# Patient Record
Sex: MALE | Race: WHITE | NOT HISPANIC OR LATINO | Employment: OTHER | URBAN - NONMETROPOLITAN AREA
[De-identification: names, ages, dates, MRNs, and addresses within clinical notes are randomized per-mention and may not be internally consistent; named-entity substitution may affect disease eponyms.]

---

## 2017-01-12 ENCOUNTER — ANTICOAGULATION THERAPY VISIT (OUTPATIENT)
Dept: ANTICOAGULATION | Facility: OTHER | Age: 82
End: 2017-01-12
Payer: COMMERCIAL

## 2017-01-12 DIAGNOSIS — Z79.01 LONG-TERM (CURRENT) USE OF ANTICOAGULANTS: Primary | ICD-10-CM

## 2017-01-12 LAB — INR POINT OF CARE: 2.9 (ref 0.86–1.14)

## 2017-01-12 PROCEDURE — 36416 COLLJ CAPILLARY BLOOD SPEC: CPT

## 2017-01-12 PROCEDURE — 99207 ZZC NO CHARGE NURSE ONLY: CPT

## 2017-01-12 PROCEDURE — 85610 PROTHROMBIN TIME: CPT | Mod: QW

## 2017-01-12 NOTE — MR AVS SNAPSHOT
Kevin Partida   1/12/2017 9:30 AM   Anticoagulation Therapy Visit    Description:  81 year old male   Provider:  ARNIE ANTI COAG   Department:  [facility]           INR as of 1/12/2017     Selected INR 2.9 (1/12/2017)      Anticoagulation Summary as of 1/12/2017     INR goal 2.0-3.0   Selected INR 2.9 (1/12/2017)   Full instructions 7.5 mg on Tue, Thu, Sat; 5 mg all other days   Next INR check 2/23/2017    Indications   Long-term (current) use of anticoagulants [Z79.01] [Z79.01]         Your next Anticoagulation Clinic appointment(s)     Feb 23, 2017  9:30 AM   Anticoagulation Visit with ARNIE ANTI VINCE   [facility]    [address] [facility] [id number] [phone]              Contact Numbers     Clinic Number:         January 2017 Details    Sun Mon Tue Wed Thu Fri Sat     1               2               3               4               5               6               7                 8               9               10               11               12      7.5 mg   See details      13      5 mg         14      7.5 mg           15      5 mg         16      5 mg         17      7.5 mg         18      5 mg         19      7.5 mg         20      5 mg         21      7.5 mg           22      5 mg         23      5 mg         24      7.5 mg         25      5 mg         26      7.5 mg         27      5 mg         28      7.5 mg           29      5 mg         30      5 mg         31      7.5 mg              Date Details   01/12 This INR check               How to take your warfarin dose     To take:  5 mg Take 1 of the 5 mg tablets.    To take:  7.5 mg Take 1.5 of the 5 mg tablets.           February 2017 Details    Sun Mon Tue Wed Thu Fri Sat        1      5 mg         2      7.5 mg         3      5 mg         4      7.5 mg           5      5 mg         6      5 mg         7      7.5 mg         8      5 mg         9      7.5 mg         10      5 mg         11      7.5 mg            12      5 mg         13      5 mg         14      7.5 mg         15      5 mg         16      7.5 mg         17      5 mg         18      7.5 mg           19      5 mg         20      5 mg         21      7.5 mg         22      5 mg         23            24               25                 26               27               28                    Date Details   No additional details    Date of next INR:  2/23/2017         How to take your warfarin dose     To take:  5 mg Take 1 of the 5 mg tablets.    To take:  7.5 mg Take 1.5 of the 5 mg tablets.

## 2017-01-12 NOTE — PROGRESS NOTES
ANTICOAGULATION FOLLOW-UP CLINIC VISIT    Patient Name:  Kevin Partida  Date:  1/12/2017  Contact Type:  Face to Face    SUBJECTIVE:     Patient Findings     Positives No Problem Findings           OBJECTIVE    INR PROTIME   Date Value Ref Range Status   01/12/2017 2.9* 0.86 - 1.14 Final       ASSESSMENT / PLAN  INR assessment THER    Recheck INR In: 6 WEEKS    INR Location Clinic      Anticoagulation Summary as of 1/12/2017     INR goal 2.0-3.0   Selected INR 2.9 (1/12/2017)   Maintenance plan 7.5 mg (5 mg x 1.5) on Tue, Thu, Sat; 5 mg (5 mg x 1) all other days   Full instructions 7.5 mg on Tue, Thu, Sat; 5 mg all other days   Weekly total 42.5 mg   No change documented Diego Almanzar RN   Plan last modified Diego Almanzar RN (3/14/2016)   Next INR check 2/23/2017   Target end date     Indications   Long-term (current) use of anticoagulants [Z79.01] [Z79.01]         Anticoagulation Episode Summary     INR check location     Preferred lab     Send INR reminders to [facility]    Comments       Anticoagulation Care Providers     Provider Role Specialty Phone number    Taqueria Borrego MD [facility] [phone]            See the Encounter Report to view Anticoagulation Flowsheet and Dosing Calendar (Go to Encounters tab in chart review, and find the Anticoagulation Therapy Visit)    Dosage adjustment made based on physician directed care plan.    Diego Almanzar RN

## 2017-02-23 ENCOUNTER — ANTICOAGULATION THERAPY VISIT (OUTPATIENT)
Dept: ANTICOAGULATION | Facility: OTHER | Age: 82
End: 2017-02-23
Payer: COMMERCIAL

## 2017-02-23 DIAGNOSIS — Z79.01 LONG-TERM (CURRENT) USE OF ANTICOAGULANTS: ICD-10-CM

## 2017-02-23 LAB — INR POINT OF CARE: 4 (ref 0.86–1.14)

## 2017-02-23 PROCEDURE — 85610 PROTHROMBIN TIME: CPT | Mod: QW

## 2017-02-23 PROCEDURE — 99207 ZZC NO CHARGE NURSE ONLY: CPT

## 2017-02-23 PROCEDURE — 36416 COLLJ CAPILLARY BLOOD SPEC: CPT

## 2017-02-23 NOTE — PROGRESS NOTES
ANTICOAGULATION FOLLOW-UP CLINIC VISIT    Patient Name:  Kevin Partida  Date:  2/23/2017  Contact Type:  Face to Face    SUBJECTIVE:     Patient Findings     Positives Other complaints (Just got over the stomach flu/diarrhea)           OBJECTIVE    INR Protime   Date Value Ref Range Status   02/23/2017 4.0 (A) 0.86 - 1.14 Final       ASSESSMENT / PLAN  INR assessment SUPRA    Recheck INR In: 6 WEEKS    INR Location Clinic      Anticoagulation Summary as of 2/23/2017     INR goal 2.0-3.0   Today's INR 4.0!   Maintenance plan 7.5 mg (5 mg x 1.5) on Tue, Thu, Sat; 5 mg (5 mg x 1) all other days   Full instructions 2/23: Hold; Otherwise 7.5 mg on Tue, Thu, Sat; 5 mg all other days   Weekly total 42.5 mg   Plan last modified Diego Almanzar RN (3/14/2016)   Next INR check 4/6/2017   Target end date     Indications   Long-term (current) use of anticoagulants [Z79.01] [Z79.01]         Anticoagulation Episode Summary     INR check location     Preferred lab     Send INR reminders to [facility]    Comments       Anticoagulation Care Providers     Provider Role Specialty Phone number    Taqueria Borrego MD [facility] [phone]            See the Encounter Report to view Anticoagulation Flowsheet and Dosing Calendar (Go to Encounters tab in chart review, and find the Anticoagulation Therapy Visit)    Dosage adjustment made based on physician directed care plan.    Diego Almanzar, RN

## 2017-02-23 NOTE — MR AVS SNAPSHOT
Kevin Partida   2/23/2017 9:30 AM   Anticoagulation Therapy Visit    Description:  81 year old male   Provider:  ARNIE ANTI COAG   Department:  [facility]           INR as of 2/23/2017     Today's INR 4.0!      Anticoagulation Summary as of 2/23/2017     INR goal 2.0-3.0   Today's INR 4.0!   Full instructions 2/23: Hold; Otherwise 7.5 mg on Tue, Thu, Sat; 5 mg all other days   Next INR check 4/6/2017    Indications   Long-term (current) use of anticoagulants [Z79.01] [Z79.01]         Your next Anticoagulation Clinic appointment(s)     Feb 23, 2017  9:30 AM CST   [facility]  ANTI [facility]AG   [facility]    [address] [facility] [address]   [phone]            Apr 06, 2017  9:30 AM CDT   Anticoagulation Visit with  ANTI COAG   [facility] ([facility]    [address] [facility] [address]   [phone]              Contact Numbers     Clinic Number:         February 2017 Details    Sun Mon Tue Wed Thu Fri Sat        1               2               3               4                 5               6               7               8               9               10               11                 12               13               14               15               16               17               18                 19               20               21               22               23      Hold   See details      24      5 mg         25      7.5 mg           26      5 mg         27      5 mg         28      7.5 mg              Date Details   02/23 This INR check               How to take your warfarin dose     To take:  5 mg Take 1 of the 5 mg tablets.    To take:  7.5 mg Take 1.5 of the 5 mg tablets.    Hold Do not take your warfarin dose. See the Details table to the right for additional instructions.                March 2017 Details    Sun Mon Tue Wed Thu Fri Sat        1      5 mg         2      7.5 mg         3      5  mg         4      7.5 mg           5      5 mg         6      5 mg         7      7.5 mg         8      5 mg         9      7.5 mg         10      5 mg         11      7.5 mg           12      5 mg         13      5 mg         14      7.5 mg         15      5 mg         16      7.5 mg         17      5 mg         18      7.5 mg           19      5 mg         20      5 mg         21      7.5 mg         22      5 mg         23      7.5 mg         24      5 mg         25      7.5 mg           26      5 mg         27      5 mg         28      7.5 mg         29      5 mg         30      7.5 mg         31      5 mg           Date Details   No additional details            How to take your warfarin dose     To take:  5 mg Take 1 of the 5 mg tablets.    To take:  7.5 mg Take 1.5 of the 5 mg tablets.           April 2017 Details    Sun Mon Tue Wed Thu Fri Sat           1      7.5 mg           2      5 mg         3      5 mg         4      7.5 mg         5      5 mg         6            7               8                 9               10               11               12               13               14               15                 16               17               18               19               20               21               22                 23               24               25               26               27               28               29                 30                      Date Details   No additional details    Date of next INR:  4/6/2017         How to take your warfarin dose     To take:  5 mg Take 1 of the 5 mg tablets.    To take:  7.5 mg Take 1.5 of the 5 mg tablets.

## 2017-03-29 DIAGNOSIS — I48.20 CHRONIC ATRIAL FIBRILLATION (H): ICD-10-CM

## 2017-03-29 RX ORDER — WARFARIN SODIUM 5 MG/1
TABLET ORAL
Qty: 105 TABLET | Refills: 0 | Status: SHIPPED | OUTPATIENT
Start: 2017-03-29 | End: 2017-06-23

## 2017-03-29 NOTE — TELEPHONE ENCOUNTER
Jantoven 5mg    Last Written Prescription Date: 12/26/16  Last Fill Qty: 105, # refills: 0  Last Office Visit with G, P or [facility] prescribing provider: 9/28/16  Next 5 appointments (look out 90 days)     Apr 11, 2017 11:30 AM CDT   Return Visit with Jaquan Platt MD   [facility]    [address] [place] [address] [phone]                   Date and Result of Last PT/INR:   Lab Results   Component Value Date    INR 4.0 02/23/2017    INR 2.9 01/12/2017    INR 2.66 09/28/2016    INR 1.03 05/31/2011      Johana Peña-Technician  [facility]  [phone]

## 2017-04-04 ENCOUNTER — HOSPITAL ENCOUNTER (OUTPATIENT)
Dept: CARDIOLOGY | Facility: CLINIC | Age: 82
Discharge: HOME | End: 2017-04-04
Attending: INTERNAL MEDICINE | Admitting: INTERNAL MEDICINE
Payer: MEDICARE

## 2017-04-04 DIAGNOSIS — I25.5 ISCHEMIC CARDIOMYOPATHY: ICD-10-CM

## 2017-04-04 DIAGNOSIS — E78.00 HYPERCHOLESTEROLEMIA: ICD-10-CM

## 2017-04-04 LAB
ALT SERPL W P-5'-P-CCNC: 23 U/L (ref 0–70)
ANION GAP SERPL CALCULATED.3IONS-SCNC: 8 MMOL/L (ref 3–14)
BUN SERPL-MCNC: 19 MG/DL (ref 7–30)
CALCIUM SERPL-MCNC: 8.8 MG/DL (ref 8.5–10.1)
CHLORIDE SERPL-SCNC: 107 MMOL/L (ref 94–109)
CHOLEST SERPL-MCNC: 132 MG/DL
CO2 SERPL-SCNC: 30 MMOL/L (ref 20–32)
CREAT SERPL-MCNC: 0.9 MG/DL (ref 0.66–1.25)
GFR SERPL CREATININE-BSD FRML MDRD: 81 ML/MIN/1.7M2
GLUCOSE SERPL-MCNC: 122 MG/DL (ref 70–99)
HDLC SERPL-MCNC: 40 MG/DL
LDLC SERPL CALC-MCNC: 71 MG/DL
NONHDLC SERPL-MCNC: 92 MG/DL
POTASSIUM SERPL-SCNC: 4.2 MMOL/L (ref 3.4–5.3)
SODIUM SERPL-SCNC: 145 MMOL/L (ref 133–144)
TRIGL SERPL-MCNC: 107 MG/DL

## 2017-04-04 PROCEDURE — 40000264 ECHO COMPLETE WITH LUMASON

## 2017-04-04 PROCEDURE — 25500064 ZZH RX 255 OP 636: Performed by: INTERNAL MEDICINE

## 2017-04-04 PROCEDURE — 84460 ALANINE AMINO (ALT) (SGPT): CPT | Performed by: INTERNAL MEDICINE

## 2017-04-04 PROCEDURE — 36415 COLL VENOUS BLD VENIPUNCTURE: CPT | Performed by: INTERNAL MEDICINE

## 2017-04-04 PROCEDURE — 80061 LIPID PANEL: CPT | Performed by: INTERNAL MEDICINE

## 2017-04-04 PROCEDURE — 80048 BASIC METABOLIC PNL TOTAL CA: CPT | Performed by: INTERNAL MEDICINE

## 2017-04-04 PROCEDURE — 93306 TTE W/DOPPLER COMPLETE: CPT | Mod: 26 | Performed by: INTERNAL MEDICINE

## 2017-04-04 RX ADMIN — SULFUR HEXAFLUORIDE 5 ML: KIT at 10:00

## 2017-04-06 ENCOUNTER — ANTICOAGULATION THERAPY VISIT (OUTPATIENT)
Dept: ANTICOAGULATION | Facility: OTHER | Age: 82
End: 2017-04-06
Payer: COMMERCIAL

## 2017-04-06 DIAGNOSIS — Z79.01 LONG-TERM (CURRENT) USE OF ANTICOAGULANTS: ICD-10-CM

## 2017-04-06 LAB — INR POINT OF CARE: 2.4 (ref 0.86–1.14)

## 2017-04-06 PROCEDURE — 85610 PROTHROMBIN TIME: CPT | Mod: QW

## 2017-04-06 PROCEDURE — 36416 COLLJ CAPILLARY BLOOD SPEC: CPT

## 2017-04-06 PROCEDURE — 99207 ZZC NO CHARGE NURSE ONLY: CPT

## 2017-04-06 NOTE — PROGRESS NOTES
ANTICOAGULATION FOLLOW-UP CLINIC VISIT    Patient Name:  Kevin Partida  Date:  4/6/2017  Contact Type:  Face to Face    SUBJECTIVE:     Patient Findings     Positives No Problem Findings           OBJECTIVE    INR Protime   Date Value Ref Range Status   04/06/2017 2.4 (A) 0.86 - 1.14 Final       ASSESSMENT / PLAN  INR assessment THER    Recheck INR In: 6 WEEKS    INR Location Clinic      Anticoagulation Summary as of 4/6/2017     INR goal 2.0-3.0   Today's INR 2.4   Maintenance plan 7.5 mg (5 mg x 1.5) on Tue, Thu, Sat; 5 mg (5 mg x 1) all other days   Full instructions 7.5 mg on Tue, Thu, Sat; 5 mg all other days   Weekly total 42.5 mg   No change documented Diego Almanzar RN   Plan last modified Diego Almanazr RN (3/14/2016)   Next INR check 5/18/2017   Target end date     Indications   Long-term (current) use of anticoagulants [Z79.01] [Z79.01]         Anticoagulation Episode Summary     INR check location     Preferred lab     Send INR reminders to [facility]    Comments       Anticoagulation Care Providers     Provider Role Specialty Phone number    Taqueria Borrego MD [facility] [phone]            See the Encounter Report to view Anticoagulation Flowsheet and Dosing Calendar (Go to Encounters tab in chart review, and find the Anticoagulation Therapy Visit)    Dosage adjustment made based on physician directed care plan.    Diego Almanzar RN

## 2017-04-06 NOTE — MR AVS SNAPSHOT
Kevin Partida   4/6/2017 9:30 AM   Anticoagulation Therapy Visit    Description:  82 year old male   Provider:  ARNIE ANTI COAG   Department:  [facility]           INR as of 4/6/2017     Today's INR 2.4      Anticoagulation Summary as of 4/6/2017     INR goal 2.0-3.0   Today's INR 2.4   Full instructions 7.5 mg on Tue, Thu, Sat; 5 mg all other days   Next INR check 5/18/2017    Indications   Long-term (current) use of anticoagulants [Z79.01] [Z79.01]         Your next Anticoagulation Clinic appointment(s)     May 18, 2017  9:30 AM CDT   Anticoagulation Visit with ARNIE ANTI VINCE   [facility]    [address] [facility] [id number] [phone]              Contact Numbers     Clinic Number:         April 2017 Details    Sun Mon Tue Wed Thu Fri Sat           1                 2               3               4               5               6      7.5 mg   See details      7      5 mg         8      7.5 mg           9      5 mg         10      5 mg         11      7.5 mg         12      5 mg         13      7.5 mg         14      5 mg         15      7.5 mg           16      5 mg         17      5 mg         18      7.5 mg         19      5 mg         20      7.5 mg         21      5 mg         22      7.5 mg           23      5 mg         24      5 mg         25      7.5 mg         26      5 mg         27      7.5 mg         28      5 mg         29      7.5 mg           30      5 mg                Date Details   04/06 This INR check               How to take your warfarin dose     To take:  5 mg Take 1 of the 5 mg tablets.    To take:  7.5 mg Take 1.5 of the 5 mg tablets.           May 2017 Details    Sun Mon Tue Wed Thu Fri Sat      1      5 mg         2      7.5 mg         3      5 mg         4      7.5 mg         5      5 mg         6      7.5 mg           7      5 mg         8      5 mg         9      7.5 mg         10      5 mg         11      7.5 mg         12      5  mg         13      7.5 mg           14      5 mg         15      5 mg         16      7.5 mg         17      5 mg         18            19               20                 21               22               23               24               25               26               27                 28               29               30               31                   Date Details   No additional details    Date of next INR:  5/18/2017         How to take your warfarin dose     To take:  5 mg Take 1 of the 5 mg tablets.    To take:  7.5 mg Take 1.5 of the 5 mg tablets.

## 2017-04-11 ENCOUNTER — TELEPHONE (OUTPATIENT)
Dept: FAMILY MEDICINE | Facility: OTHER | Age: 82
End: 2017-04-11

## 2017-04-11 ENCOUNTER — RADIANT APPOINTMENT (OUTPATIENT)
Dept: GENERAL RADIOLOGY | Facility: CLINIC | Age: 82
End: 2017-04-11
Attending: INTERNAL MEDICINE
Payer: COMMERCIAL

## 2017-04-11 ENCOUNTER — OFFICE VISIT (OUTPATIENT)
Dept: CARDIOLOGY | Facility: CLINIC | Age: 82
End: 2017-04-11
Payer: COMMERCIAL

## 2017-04-11 VITALS
HEIGHT: 73 IN | BODY MASS INDEX: 28.6 KG/M2 | DIASTOLIC BLOOD PRESSURE: 60 MMHG | WEIGHT: 215.8 LBS | HEART RATE: 88 BPM | SYSTOLIC BLOOD PRESSURE: 120 MMHG | OXYGEN SATURATION: 95 %

## 2017-04-11 DIAGNOSIS — R05.9 COUGH: Primary | ICD-10-CM

## 2017-04-11 DIAGNOSIS — E78.5 HYPERLIPIDEMIA LDL GOAL <70: ICD-10-CM

## 2017-04-11 DIAGNOSIS — I25.5 ISCHEMIC CARDIOMYOPATHY: ICD-10-CM

## 2017-04-11 DIAGNOSIS — R05.9 COUGH: ICD-10-CM

## 2017-04-11 DIAGNOSIS — J44.1 OBSTRUCTIVE CHRONIC BRONCHITIS WITH EXACERBATION (H): Primary | ICD-10-CM

## 2017-04-11 DIAGNOSIS — I25.10 CORONARY ARTERY DISEASE INVOLVING NATIVE CORONARY ARTERY OF NATIVE HEART WITHOUT ANGINA PECTORIS: ICD-10-CM

## 2017-04-11 DIAGNOSIS — F17.200 TOBACCO USE DISORDER: ICD-10-CM

## 2017-04-11 DIAGNOSIS — I48.20 CHRONIC ATRIAL FIBRILLATION (H): ICD-10-CM

## 2017-04-11 DIAGNOSIS — R06.02 SHORTNESS OF BREATH: ICD-10-CM

## 2017-04-11 PROCEDURE — 71020 XR CHEST 2 VW: CPT | Mod: TC

## 2017-04-11 PROCEDURE — 99214 OFFICE O/P EST MOD 30 MIN: CPT | Performed by: INTERNAL MEDICINE

## 2017-04-11 RX ORDER — FUROSEMIDE 20 MG
20 TABLET ORAL DAILY
Qty: 90 TABLET | Refills: 3 | Status: SHIPPED | OUTPATIENT
Start: 2017-04-11 | End: 2018-04-05

## 2017-04-11 RX ORDER — CEFUROXIME AXETIL 500 MG/1
500 TABLET ORAL 2 TIMES DAILY
Qty: 20 TABLET | Refills: 0 | Status: SHIPPED | OUTPATIENT
Start: 2017-04-11 | End: 2017-05-12

## 2017-04-11 RX ORDER — PREDNISONE 20 MG/1
40 TABLET ORAL DAILY
Qty: 10 TABLET | Refills: 0 | Status: SHIPPED | OUTPATIENT
Start: 2017-04-11 | End: 2017-04-16

## 2017-04-11 NOTE — PROGRESS NOTES
HPI and Plan:   See dictation    Orders Placed This Encounter   Procedures     X-ray Chest 2 vws*     Basic metabolic panel     Follow-Up with Cardiologist     Follow-Up with Cardiac Advanced Practice Provider     Echocardiogram       Orders Placed This Encounter   Medications     furosemide (LASIX) 20 MG tablet     Sig: Take 1 tablet (20 mg) by mouth daily     Dispense:  90 tablet     Refill:  3       Medications Discontinued During This Encounter   Medication Reason     azithromycin (ZITHROMAX) 250 MG tablet Therapy completed         Encounter Diagnoses   Name Primary?     Ischemic cardiomyopathy      Cough Yes     Shortness of breath      Hyperlipidemia LDL goal <70      Tobacco use disorder      Chronic atrial fibrillation (H)      Coronary artery disease involving native coronary artery of native heart without angina pectoris        CURRENT MEDICATIONS:  Current Outpatient Prescriptions   Medication Sig Dispense Refill     furosemide (LASIX) 20 MG tablet Take 1 tablet (20 mg) by mouth daily 90 tablet 3     warfarin (JANTOVEN) 5 MG tablet Take 7.5 mg on Tuesday, Thursday, Saturday and 5 mg the rest of the week or as directed by coumadin clinic 105 tablet 0     lisinopril (PRINIVIL,ZESTRIL) 10 MG tablet Take 1 tablet (10 mg) by mouth daily 90 tablet 2     carvedilol (COREG) 12.5 MG tablet Take 1 tablet (12.5 mg) by mouth 2 times daily (with meals) 180 tablet 2     simvastatin (ZOCOR) 80 MG tablet Take 1 tablet (80 mg) by mouth daily 90 tablet 3     Omega-3 Fatty Acids (OMEGA-3 FISH OIL PO) Take 1 g by mouth daily       aspirin 81 MG tablet Take 1 tablet by mouth daily.       cefuroxime (CEFTIN) 500 MG tablet Take 1 tablet (500 mg) by mouth 2 times daily 20 tablet 0     predniSONE (DELTASONE) 20 MG tablet Take 2 tablets (40 mg) by mouth daily for 5 days 10 tablet 0       ALLERGIES     Allergies   Allergen Reactions     Codeine GI Disturbance     Niacin Hives     got very red and flushed.  Doesn't want       PAST  MEDICAL HISTORY:  Past Medical History:   Diagnosis Date     Atrial fibrillation (H) 07    Admit. Discharged 04/15/07     Atrial flutter (H)      Coronary artery disease      Headache(784.0)      Hepatitis, unspecified      Hyperlipidaemia      Hypertension      Measles without mention of complication     Measles     Mumps without mention of complication     Mumps     Orchitis and epididymitis, unspecified      Other and unspecified hyperlipidemia      Other emphysema (H)      Other speech disturbance     Stuttering     Pneumonia, organism unspecified(486)     Pneumonia     Shortness of breath      Urinary obstruction      Varicella without mention of complication     Chickenpox       PAST SURGICAL HISTORY:  Past Surgical History:   Procedure Laterality Date     COLONOSCOPY  2011    Procedure:COLONOSCOPY; colonoscopy; Surgeon:IVETH WIGGINS; Location:[facility]     HC COLONOSCOPY W/WO BRUSH/WASH  06     HC LAPAROSCOPY, SURGICAL; CHOLECYSTECTOMY      Cholecystectomy, Laparoscopic     HC REMOVAL OF TONSILS,<13 Y/O      Tonsils <12y.o.     LAMINECT/DISCECTOMY, LUMBAR  08    Right L4-L5 microlumbar diskectomy.       FAMILY HISTORY:  Family History   Problem Relation Age of Onset     Alzheimer Disease Mother      ?dimentia or alzheimers     DIABETES Mother      insulin     EYE* Mother      cataract     CEREBROVASCULAR DISEASE Mother      CANCER Father      lymph tumor of glands     DIABETES Maternal Aunt      ? oral or insulin     DIABETES Other      4 cousins insulin dependent     Genetic Disorder Maternal Grandmother      tremors     HEART DISEASE Paternal Uncle      ? at age 60-70     Neurologic Disorder Paternal Uncle      parkinsons     CEREBROVASCULAR DISEASE Paternal Uncle      ? dued at age 60-70       SOCIAL HISTORY:  Social History     Social History     Marital status:      Spouse name: Irlanda     Number of children: 2     Years of education: 12+     Occupational History      "retired telephone [employer]     Social History Main Topics     Smoking status: Current Every Day Smoker     Packs/day: 0.50     Types: Cigarettes     Smokeless tobacco: Never Used      Comment: smoked for 57 years- since age 9     Alcohol use 0.0 oz/week     0 Standard drinks or equivalent per week      Comment: socially     Drug use: No     Sexual activity: Not Currently     Other Topics Concern      Service Yes     [employer] x 8 yrs     Blood Transfusions No     Caffeine Concern No     6 cups coffee/day     Occupational Exposure No     worked outside mainly- installation of telephones.     Hobby Hazards Yes     4 [employer],hunting,works on trucks and cars      Sleep Concern No     Stress Concern No     Weight Concern No     Special Diet No     Back Care No     Exercise Yes     outside work, cuts wood, yard work, shovels snow off roof     Bike Helmet No     Seat Belt Yes     Self-Exams Yes     Parent/Sibling W/ Cabg, Mi Or Angioplasty Before 65f 55m? Yes     Social History Narrative       Review of Systems:  Skin:  Positive for bruising bruises easily    Eyes:  Positive for glasses    ENT:  Negative      Respiratory:  Positive for dyspnea on exertion;cough;mucoid expectorant;purulent expectorant emphesima    Cardiovascular:  Negative for;palpitations;chest pain Positive for;fatigue;edema;lightheadedness;dizziness    Gastroenterology: Negative      Genitourinary:  Positive for nocturia    Musculoskeletal:  Positive for back pain    Neurologic:  Positive for headaches body aches, chest sore with coughing  Psychiatric:  Positive for sleep disturbances    Heme/Lymph/Imm:  Positive for allergies    Endocrine:  Negative        Physical Exam:  Vitals: /60 (BP Location: Left arm, Patient Position: Fowlers, Cuff Size: Adult Large)  Pulse 88  Ht 1.854 m (6' 1\")  Wt 97.9 kg (215 lb 12.8 oz)  SpO2 95%  BMI 28.47 kg/m2    Constitutional:  in no acute distress;cooperative;alert and oriented    "     Skin:  warm and dry to the touch    (cyanotic nose)    Head:  normocephalic        Eyes:  pupils equal and round;conjunctivae and lids unremarkable (wears glasses)        ENT:  no pallor or cyanosis, dentition good        Neck:  carotid pulses are full and equal bilaterally;no carotid bruit JVP 10-12      Chest:    prolonged expiration;expiratory wheezes;diminished breath sounds bilaterally        Cardiac:   irregularly irregular rhythm   no presence of murmur            Abdomen:  non-tender;abdomen soft;BS normoactive        Vascular: pulses full and equal                                        Extremities and Back:  no deformities, clubbing, cyanosis, erythema observed   bilateral LE edema;1+          Neurological:  oriented to time, person and place;no gross motor deficits              CC  No referring provider defined for this encounter.

## 2017-04-11 NOTE — TELEPHONE ENCOUNTER
An antibiotic as well as short course of cortisone have been sent to the patient's pharmacy in [place]. He should have a chest x-ray done in [place] before he leaves and then  his medication. I would like to see him in the clinic sometime next week.    Thank you    Marlen

## 2017-04-11 NOTE — LETTER
4/11/2017      RE: Kevin Partida  [address] [facility] [address]       Dear Colleague,    Thank you for the opportunity to participate in the care of your patient, Kevin Partida, at the [facility] at [facility]. Please see a copy of my visit note below.    HPI and Plan:   See dictation    Orders Placed This Encounter   Procedures     X-ray Chest 2 vws*     Basic metabolic panel     Follow-Up with Cardiologist     Follow-Up with Cardiac Advanced Practice Provider     Echocardiogram       Orders Placed This Encounter   Medications     furosemide (LASIX) 20 MG tablet     Sig: Take 1 tablet (20 mg) by mouth daily     Dispense:  90 tablet     Refill:  3       Medications Discontinued During This Encounter   Medication Reason     azithromycin (ZITHROMAX) 250 MG tablet Therapy completed         Encounter Diagnoses   Name Primary?     Ischemic cardiomyopathy      Cough Yes     Shortness of breath      Hyperlipidemia LDL goal <70      Tobacco use disorder      Chronic atrial fibrillation (H)      Coronary artery disease involving native coronary artery of native heart without angina pectoris        CURRENT MEDICATIONS:  Current Outpatient Prescriptions   Medication Sig Dispense Refill     furosemide (LASIX) 20 MG tablet Take 1 tablet (20 mg) by mouth daily 90 tablet 3     warfarin (JANTOVEN) 5 MG tablet Take 7.5 mg on Tuesday, Thursday, Saturday and 5 mg the rest of the week or as directed by coumadin clinic 105 tablet 0     lisinopril (PRINIVIL,ZESTRIL) 10 MG tablet Take 1 tablet (10 mg) by mouth daily 90 tablet 2     carvedilol (COREG) 12.5 MG tablet Take 1 tablet (12.5 mg) by mouth 2 times daily (with meals) 180 tablet 2     simvastatin (ZOCOR) 80 MG tablet Take 1 tablet (80 mg) by mouth daily 90 tablet 3     Omega-3 Fatty Acids (OMEGA-3 FISH OIL PO) Take 1 g by mouth daily       aspirin 81 MG tablet Take 1 tablet by mouth daily.       cefuroxime (CEFTIN)  500 MG tablet Take 1 tablet (500 mg) by mouth 2 times daily 20 tablet 0     predniSONE (DELTASONE) 20 MG tablet Take 2 tablets (40 mg) by mouth daily for 5 days 10 tablet 0       ALLERGIES     Allergies   Allergen Reactions     Codeine GI Disturbance     Niacin Hives     got very red and flushed.  Doesn't want       PAST MEDICAL HISTORY:  Past Medical History:   Diagnosis Date     Atrial fibrillation (H) 07    Admit. Discharged 04/15/07     Atrial flutter (H)      Coronary artery disease      Headache(784.0)      Hepatitis, unspecified      Hyperlipidaemia      Hypertension      Measles without mention of complication     Measles     Mumps without mention of complication     Mumps     Orchitis and epididymitis, unspecified      Other and unspecified hyperlipidemia      Other emphysema (H)      Other speech disturbance     Stuttering     Pneumonia, organism unspecified(486)     Pneumonia     Shortness of breath      Urinary obstruction      Varicella without mention of complication     Chickenpox       PAST SURGICAL HISTORY:  Past Surgical History:   Procedure Laterality Date     COLONOSCOPY  2011    Procedure:COLONOSCOPY; colonoscopy; Surgeon:IVETH WIGGINS; Location:[facility]     HC COLONOSCOPY W/WO BRUSH/WASH  06     HC LAPAROSCOPY, SURGICAL; CHOLECYSTECTOMY      Cholecystectomy, Laparoscopic     HC REMOVAL OF TONSILS,<13 Y/O      Tonsils <12y.o.     LAMINECT/DISCECTOMY, LUMBAR  08    Right L4-L5 microlumbar diskectomy.       FAMILY HISTORY:  Family History   Problem Relation Age of Onset     Alzheimer Disease Mother      ?dimentia or alzheimers     DIABETES Mother      insulin     EYE* Mother      cataract     CEREBROVASCULAR DISEASE Mother      CANCER Father      lymph tumor of glands     DIABETES Maternal Aunt      ? oral or insulin     DIABETES Other      4 cousins insulin dependent     Genetic Disorder Maternal Grandmother      tremors     HEART DISEASE Paternal Uncle      ? at age  60-70     Neurologic Disorder Paternal Uncle      parkinsons     CEREBROVASCULAR DISEASE Paternal Uncle      ? dued at age 60-70       SOCIAL HISTORY:  Social History     Social History     Marital status:      Spouse name: Irlanda     Number of children: 2     Years of education: 12+     Occupational History     retired telephone [employer]     Social History Main Topics     Smoking status: Current Every Day Smoker     Packs/day: 0.50     Types: Cigarettes     Smokeless tobacco: Never Used      Comment: smoked for 57 years- since age 9     Alcohol use 0.0 oz/week     0 Standard drinks or equivalent per week      Comment: socially     Drug use: No     Sexual activity: Not Currently     Other Topics Concern      Service Yes     [employer] x 8 yrs     Blood Transfusions No     Caffeine Concern No     6 cups coffee/day     Occupational Exposure No     worked outside mainly- installation of telephones.     Hobby Hazards Yes     4 [employer],hunting,works on trucks and cars      Sleep Concern No     Stress Concern No     Weight Concern No     Special Diet No     Back Care No     Exercise Yes     outside work, cuts wood, yard work, shovels snow off roof     Bike Helmet No     Seat Belt Yes     Self-Exams Yes     Parent/Sibling W/ Cabg, Mi Or Angioplasty Before 65f 55m? Yes     Social History Narrative       Review of Systems:  Skin:  Positive for bruising bruises easily    Eyes:  Positive for glasses    ENT:  Negative      Respiratory:  Positive for dyspnea on exertion;cough;mucoid expectorant;purulent expectorant emphesima    Cardiovascular:  Negative for;palpitations;chest pain Positive for;fatigue;edema;lightheadedness;dizziness    Gastroenterology: Negative      Genitourinary:  Positive for nocturia    Musculoskeletal:  Positive for back pain    Neurologic:  Positive for headaches body aches, chest sore with coughing  Psychiatric:  Positive for sleep disturbances    Heme/Lymph/Imm:   "Positive for allergies    Endocrine:  Negative        Physical Exam:  Vitals: /60 (BP Location: Left arm, Patient Position: Fowlers, Cuff Size: Adult Large)  Pulse 88  Ht 1.854 m (6' 1\")  Wt 97.9 kg (215 lb 12.8 oz)  SpO2 95%  BMI 28.47 kg/m2    Constitutional:  in no acute distress;cooperative;alert and oriented        Skin:  warm and dry to the touch    (cyanotic nose)    Head:  normocephalic        Eyes:  pupils equal and round;conjunctivae and lids unremarkable (wears glasses)        ENT:  no pallor or cyanosis, dentition good        Neck:  carotid pulses are full and equal bilaterally;no carotid bruit JVP 10-12      Chest:    prolonged expiration;expiratory wheezes;diminished breath sounds bilaterally        Cardiac:   irregularly irregular rhythm   no presence of murmur            Abdomen:  non-tender;abdomen soft;BS normoactive        Vascular: pulses full and equal                                        Extremities and Back:  no deformities, clubbing, cyanosis, erythema observed   bilateral LE edema;1+          Neurological:  oriented to time, person and place;no gross motor deficits              CC  No referring provider defined for this encounter.    Please do not hesitate to contact me if you have any questions/concerns.     Sincerely,     JAI EPPS MD    "

## 2017-04-11 NOTE — LETTER
4/11/2017    Chad Gee, [address] [place] [address]    RE: Kevin Partida       Dear Colleague,    I had the opportunity to see Mr. Kevin Partida in Cardiology Clinic today at the [facility] in [place] for reevaluation of chronic ischemic cardiomyopathy.  Fortunately, he has not really had difficulty with congestive heart failure.  He has chronic atrial fibrillation and coronary artery disease and has had previous angioplasty and stenting of severe circumflex disease in the past.  Unfortunately, his left ventricular function did not improve after that procedure.  He had an ejection fraction of 30%-35% at that time and has had relatively stable function since then.  However, this year, his echocardiogram shows some decrease in his left ventricular function, now with an ejection fraction estimated at 25%, indicating severe left ventricular dysfunction.      He comes in today with upper respiratory infection symptoms.  He has had these about a week.  His symptoms include myalgias, fatigue, fevers, chills, sweats and cough.  He is coughing up some green-tinged sputum on a frequent basis.  Unfortunately, he has been a longtime smoker since the age of 9 and has refused to quit despite my recommendations.  He likely has significant underlying COPD.      Prior to getting sick recently, he was still quite active.  He likes to be outside and chop wood and work in the yard.  His wife tells me that he has taken more breaks and is slowing down but is still able to do much of the same activity at a slower pace.  He tells me that he has not been bothered by shortness of breath much until he got sick recently.      His laboratory studies look good.  His cholesterol numbers are excellent.  His basic metabolic panel is normal.  Hemoglobin is 17.5 and INR has been therapeutic.  He gets nosebleeds from time to time and tells me he just likes to let the  bleeding run for a while.  He thinks that is a helpful treatment.  It sounds like he ascribes to the age old tradition of bloodletting.      PHYSICAL EXAMINATION:  His blood pressure was 120/60, heart rate 88 and weight 215 pounds.  His heart rate is a little higher than usual.  Otherwise, everything seems to be quite stable.  His lungs demonstrate some diminished breath sounds with a few expiratory wheezes on the right side.  His jugular venous pressure appears to be a little bit elevated, but he is breathing comfortably and is in no distress.  His heart rhythm is irregularly irregular without cardiac murmur.  He has 1+ bilateral lower extremity ankle edema.     Outpatient Encounter Prescriptions as of 4/11/2017   Medication Sig Dispense Refill     furosemide (LASIX) 20 MG tablet Take 1 tablet (20 mg) by mouth daily 90 tablet 3     [DISCONTINUED] warfarin (JANTOVEN) 5 MG tablet Take 7.5 mg on Tuesday, Thursday, Saturday and 5 mg the rest of the week or as directed by coumadin clinic 105 tablet 0     [DISCONTINUED] lisinopril (PRINIVIL,ZESTRIL) 10 MG tablet Take 1 tablet (10 mg) by mouth daily 90 tablet 2     [DISCONTINUED] carvedilol (COREG) 12.5 MG tablet Take 1 tablet (12.5 mg) by mouth 2 times daily (with meals) 180 tablet 2     simvastatin (ZOCOR) 80 MG tablet Take 1 tablet (80 mg) by mouth daily 90 tablet 3     Omega-3 Fatty Acids (OMEGA-3 FISH OIL PO) Take 1 g by mouth daily       aspirin 81 MG tablet Take 1 tablet by mouth daily.       [DISCONTINUED] azithromycin (ZITHROMAX) 250 MG tablet Two tablets first day, then one tablet daily for four days. 6 tablet 0     No facility-administered encounter medications on file as of 4/11/2017.       IMPRESSIONS:  Mr. Kevin Partdia is an 82-year-old gentleman with coronary artery disease, ischemic cardiomyopathy and chronic atrial fibrillation.  He has hypertension, dyslipidemia and ongoing smoking as well and probably has significant underlying lung disease.  Over the  last week, he has developed progressively worsening upper respiratory infection, probably bronchitis, if not pneumonia.  I discussed the situation with you over the phone today and we decided to send him for a chest x-ray and start him on empiric therapy with antibiotics and additional treatment for possible COPD exacerbation.  I suspect he may have a bit of fluid overload as well and will start him on a low dose of Lasix 20 mg a day.      I have suggested that he have an ICD implanted for primary prevention of sudden cardiac death.  In the past, he has always consistently adamantly refused.  He has also refused to quit smoking.      At this point, he will follow up with you after a week or so to see if he is improving and I will plan to have him return to Cardiology Clinic in about 3 months.     Again, thank you for allowing me to participate in the care of your patient.      Sincerely,    JAI EPPS MD     [facility]

## 2017-04-11 NOTE — MR AVS SNAPSHOT
After Visit Summary   4/11/2017    Kevin Partida    MRN: [id number]           Patient Information     Date Of Birth          1935        Visit Information        Provider Department      4/11/2017 11:30 AM Jaquan Platt MD [facility]        Today's Diagnoses     Cough    -  1    Ischemic cardiomyopathy        Shortness of breath        Hyperlipidemia LDL goal <70        Tobacco use disorder        Chronic atrial fibrillation (H)        Coronary artery disease involving native coronary artery of native heart without angina pectoris           Follow-ups after your visit        Additional Services     Follow-Up with Cardiac Advanced Practice Provider           Follow-Up with Cardiologist                 Your next 10 appointments already scheduled     May 18, 2017  9:30 AM CDT   Anticoagulation Visit with  ANTI COAG   [facility]    [address] [facility] [id number] [phone]              Future tests that were ordered for you today     Open Future Orders        Priority Expected Expires Ordered    Echocardiogram Routine 4/11/2018 5/16/2018 4/11/2017    Follow-Up with Cardiologist Routine 4/11/2018 8/24/2018 4/11/2017    Basic metabolic panel Routine 8/9/2017 4/11/2018 4/11/2017    Follow-Up with Cardiac Advanced Practice Provider Routine 8/9/2017 4/11/2018 4/11/2017    XR Chest 2 Views Routine 4/11/2017 4/11/2018 4/11/2017    X-ray Chest 2 vws* Routine 4/11/2017 4/11/2018 4/11/2017            Who to contact     If you have questions or need follow up information about today's clinic visit or your schedule please contact [facility] directly at [phone].  Normal or non-critical lab and imaging results will be communicated to you by MyChart, letter or phone within 4 business days after the clinic has received the results. If you do not hear from us within 7 days, please contact the clinic through MyChart or phone.  "If you have a critical or abnormal lab result, we will notify you by phone as soon as possible.  Submit refill requests through [employer] or call your pharmacy and they will forward the refill request to us. Please allow 3 business days for your refill to be completed.          Additional Information About Your Visit        [employer] Information     [employer] gives you secure access to your electronic health record. If you see a primary care provider, you can also send messages to your care team and make appointments. If you have questions, please call your primary care clinic.  If you do not have a primary care provider, please call [phone] and they will assist you.        Care EveryWhere ID     This is your Care EveryWhere ID. This could be used by other organizations to access your [place] medical records  [id number]        Your Vitals Were     Pulse Height Pulse Oximetry BMI (Body Mass Index)          88 1.854 m (6' 1\") 95% 28.47 kg/m2         Blood Pressure from Last 3 Encounters:   04/11/17 120/60   09/28/16 122/78   07/26/16 120/68    Weight from Last 3 Encounters:   04/11/17 97.9 kg (215 lb 12.8 oz)   09/28/16 100 kg (220 lb 6.4 oz)   07/26/16 100.7 kg (222 lb)              We Performed the Following     Follow-Up with Cardiologist          Today's Medication Changes          These changes are accurate as of: 4/11/17 12:10 PM.  If you have any questions, ask your nurse or doctor.               Start taking these medicines.        Dose/Directions    cefuroxime 500 MG tablet   Commonly known as:  CEFTIN   Used for:  Obstructive chronic bronchitis with exacerbation (H)   Started by:  Chad Gee DO        Dose:  500 mg   Take 1 tablet (500 mg) by mouth 2 times daily   Quantity:  20 tablet   Refills:  0       furosemide 20 MG tablet   Commonly known as:  LASIX   Used for:  Ischemic cardiomyopathy   Started by:  Jaquan Platt MD        Dose:  20 mg   Take 1 tablet (20 mg) by mouth daily "   Quantity:  90 tablet   Refills:  3       predniSONE 20 MG tablet   Commonly known as:  DELTASONE   Used for:  Obstructive chronic bronchitis with exacerbation (H)   Started by:  Chad Gee DO        Dose:  40 mg   Take 2 tablets (40 mg) by mouth daily for 5 days   Quantity:  10 tablet   Refills:  0            Where to get your medicines      These medications were sent to [facility] [place] - [address]  [address] , [facility] [address]     Phone:  [phone]     cefuroxime 500 MG tablet    furosemide 20 MG tablet    predniSONE 20 MG tablet                Primary Care Provider Office Phone # Fax #    Chad Gee [phone]       [address] [place] [address]        Thank you!     Thank you for choosing [facility]  for your care. Our goal is always to provide you with excellent care. Hearing back from our patients is one way we can continue to improve our services. Please take a few minutes to complete the written survey that you may receive in the mail after your visit with us. Thank you!             Your Updated Medication List - Protect others around you: Learn how to safely use, store and throw away your medicines at www.disposemymeds.org.          This list is accurate as of: 4/11/17 12:10 PM.  Always use your most recent med list.                   Brand Name Dispense Instructions for use    aspirin 81 MG tablet      Take 1 tablet by mouth daily.       carvedilol 12.5 MG tablet    COREG    180 tablet    Take 1 tablet (12.5 mg) by mouth 2 times daily (with meals)       cefuroxime 500 MG tablet    CEFTIN    20 tablet    Take 1 tablet (500 mg) by mouth 2 times daily       furosemide 20 MG tablet    LASIX    90 tablet    Take 1 tablet (20 mg) by mouth daily       lisinopril 10 MG tablet    PRINIVIL/ZESTRIL    90 tablet    Take 1 tablet (10 mg) by mouth daily       OMEGA-3 FISH OIL PO      Take 1 g  by mouth daily       predniSONE 20 MG tablet    DELTASONE    10 tablet    Take 2 tablets (40 mg) by mouth daily for 5 days       simvastatin 80 MG tablet    ZOCOR    90 tablet    Take 1 tablet (80 mg) by mouth daily       warfarin 5 MG tablet    JANTOVEN    105 tablet    Take 7.5 mg on Tuesday, Thursday, Saturday and 5 mg the rest of the week or as directed by coumadin clinic

## 2017-04-21 ENCOUNTER — OFFICE VISIT (OUTPATIENT)
Dept: FAMILY MEDICINE | Facility: OTHER | Age: 82
End: 2017-04-21
Payer: COMMERCIAL

## 2017-04-21 VITALS
SYSTOLIC BLOOD PRESSURE: 92 MMHG | DIASTOLIC BLOOD PRESSURE: 60 MMHG | WEIGHT: 212 LBS | OXYGEN SATURATION: 88 % | TEMPERATURE: 97.5 F | HEART RATE: 60 BPM | BODY MASS INDEX: 27.97 KG/M2

## 2017-04-21 DIAGNOSIS — J18.9 PNEUMONIA OF RIGHT LOWER LOBE DUE TO INFECTIOUS ORGANISM: Primary | ICD-10-CM

## 2017-04-21 DIAGNOSIS — I42.9 PRIMARY CARDIOMYOPATHY (H): ICD-10-CM

## 2017-04-21 DIAGNOSIS — I25.10 CORONARY ARTERY DISEASE INVOLVING NATIVE CORONARY ARTERY OF NATIVE HEART WITHOUT ANGINA PECTORIS: ICD-10-CM

## 2017-04-21 DIAGNOSIS — I48.20 CHRONIC ATRIAL FIBRILLATION (H): ICD-10-CM

## 2017-04-21 PROCEDURE — 99214 OFFICE O/P EST MOD 30 MIN: CPT | Performed by: INTERNAL MEDICINE

## 2017-04-21 ASSESSMENT — PAIN SCALES - GENERAL: PAINLEVEL: NO PAIN (0)

## 2017-04-21 NOTE — NURSING NOTE
"Chief Complaint   Patient presents with     Results     RECHECK     after cardiolology appt       Initial BP 92/60 (BP Location: Left arm, Patient Position: Chair, Cuff Size: Adult Large)  Pulse 60  Temp 97.5  F (36.4  C) (Temporal)  Wt 212 lb (96.2 kg)  SpO2 (!) 88%  BMI 27.97 kg/m2 Estimated body mass index is 27.97 kg/(m^2) as calculated from the following:    Height as of 4/11/17: 6' 1\" (1.854 m).    Weight as of this encounter: 212 lb (96.2 kg).  Medication Reconciliation: complete  Kalpana SINGER    "

## 2017-04-21 NOTE — MR AVS SNAPSHOT
After Visit Summary   4/21/2017    Kevin Partida    MRN: [id number]           Patient Information     Date Of Birth          1935        Visit Information        Provider Department      4/21/2017 9:40 AM Chad Gee DO [facility]        Today's Diagnoses     Pneumonia of right lower lobe due to infectious organism    -  1    Primary cardiomyopathy (H)        Coronary artery disease involving native coronary artery of native heart without angina pectoris        Chronic atrial fibrillation (H)           Follow-ups after your visit        Your next 10 appointments already scheduled     May 12, 2017  9:40 AM CDT   Office Visit with Chad Gee DO   [facility]    [address] [facility] [address] [phone]           Bring a current list of meds and any records pertaining to this visit.  For Physicals, please bring immunization records and any forms needing to be filled out.  Please arrive 10 minutes early to complete paperwork.            May 18, 2017  9:30 AM CDT   Anticoagulation Visit with [facility] ANTI COAJENNY   [facility])    [address] [facility] [address] [phone]              Who to contact     If you have questions or need follow up information about today's clinic visit or your schedule please contact [facility] directly at [phone].  Normal or non-critical lab and imaging results will be communicated to you by MyChart, letter or phone within 4 business days after the clinic has received the results. If you do not hear from us within 7 days, please contact the clinic through MyChart or phone. If you have a critical or abnormal lab result, we will notify you by phone as soon as possible.  Submit refill requests through [employer] or call your pharmacy and they will forward the refill request to us. Please allow 3 business days for your refill  to be completed.          Additional Information About Your Visit        [employer] Information     [employer] gives you secure access to your electronic health record. If you see a primary care provider, you can also send messages to your care team and make appointments. If you have questions, please call your primary care clinic.  If you do not have a primary care provider, please call [phone] and they will assist you.        Care EveryWhere ID     This is your Care EveryWhere ID. This could be used by other organizations to access your [place] medical records  [id number]        Your Vitals Were     Pulse Temperature Pulse Oximetry BMI (Body Mass Index)          60 97.5  F (36.4  C) (Temporal) 88% 27.97 kg/m2         Blood Pressure from Last 3 Encounters:   04/21/17 92/60   04/11/17 120/60   09/28/16 122/78    Weight from Last 3 Encounters:   04/21/17 212 lb (96.2 kg)   04/11/17 215 lb 12.8 oz (97.9 kg)   09/28/16 220 lb 6.4 oz (100 kg)              Today, you had the following     No orders found for display       Primary Care Provider Office Phone # Fax #    Chad Librado Gee [phone]       [address]   [facility] [address]        Thank you!     Thank you for choosing [facility]  for your care. Our goal is always to provide you with excellent care. Hearing back from our patients is one way we can continue to improve our services. Please take a few minutes to complete the written survey that you may receive in the mail after your visit with us. Thank you!             Your Updated Medication List - Protect others around you: Learn how to safely use, store and throw away your medicines at www.disposemymeds.org.          This list is accurate as of: 4/21/17 11:59 PM.  Always use your most recent med list.                   Brand Name Dispense Instructions for use    aspirin 81 MG tablet      Take 1 tablet by mouth daily.       carvedilol 12.5 MG  tablet    COREG    180 tablet    Take 1 tablet (12.5 mg) by mouth 2 times daily (with meals)       cefuroxime 500 MG tablet    CEFTIN    20 tablet    Take 1 tablet (500 mg) by mouth 2 times daily       furosemide 20 MG tablet    LASIX    90 tablet    Take 1 tablet (20 mg) by mouth daily       lisinopril 10 MG tablet    PRINIVIL/ZESTRIL    90 tablet    Take 1 tablet (10 mg) by mouth daily       OMEGA-3 FISH OIL PO      Take 1 g by mouth daily       simvastatin 80 MG tablet    ZOCOR    90 tablet    Take 1 tablet (80 mg) by mouth daily       warfarin 5 MG tablet    JANTOVEN    105 tablet    Take 7.5 mg on Tuesday, Thursday, Saturday and 5 mg the rest of the week or as directed by coumadin clinic

## 2017-04-21 NOTE — PROGRESS NOTES
Chief Complaint   Patient presents with     Results     RECHECK     after cardiolology appt     CHIEF COMPLAINT:    The patient is a pleasant 82-year-old gentleman who was recently in to see his cardiologist where he was diagnosed having bronchitis and subsequently pneumonia. He was placed on Ceftin and now notes that he is doing much better. The cough has decreased significantly. The sputum has resolved. He still has a little bit of posterior nasal drainage. Chest x-ray demonstrated some interstitial changes and questionable right lower lobe pneumonia. He notes that his breathing has improved though his oxygen saturation is still 88 after ambulating into the building. He notes that he has no significant dyspnea. He does not wish to discuss oxygen therapy. He is taking food and fluid adequately. He does have a little bit of edema in the lower extremities but has not been elevating his feet. He is on diuretic therapy as directed by his cardiologist. The edema really is about 1+ or less. It generally resolves by morning. Does have history of some cardiac ischemia as well as cardiomyopathy. His most recent ejection fraction was about 30%. No                         PAST, FAMILY,SOCIAL HISTORY:     Medical  History:   has a past medical history of Atrial fibrillation (H) (04/14/07); Atrial flutter (H); Coronary artery disease; Headache(784.0); Hepatitis, unspecified; Hyperlipidaemia; Hypertension; Measles without mention of complication; Mumps without mention of complication; Orchitis and epididymitis, unspecified; Other and unspecified hyperlipidemia; Other emphysema (H); Other speech disturbance; Pneumonia, organism unspecified; Shortness of breath; Urinary obstruction; and Varicella without mention of complication.       Surgical History:   has a past surgical history that includes LAPAROSCOPY, SURGICAL; CHOLECYSTECTOMY (1998); REMOVAL OF TONSILS,<13 Y/O; Colonoscopy w/wo [place] **Performed** (05/08/06);  laminect/discectomy, lumbar (03/26/08); and Colonoscopy (9/13/2011).     Social History:   reports that he has quit smoking. His smoking use included Cigarettes. He smoked 0.50 packs per day. He has never used smokeless tobacco. He reports that he drinks alcohol. He reports that he does not use illicit drugs.     Family History:  family history includes Alzheimer Disease in his mother; CANCER in his father; CEREBROVASCULAR DISEASE in his mother and paternal uncle; DIABETES in his maternal aunt, mother, and another family member; EYE* in his mother; Genetic Disorder in his maternal grandmother; HEART DISEASE in his paternal uncle; Neurologic Disorder in his paternal uncle.            MEDICATIONS  Current Outpatient Prescriptions   Medication Sig Dispense Refill     cefuroxime (CEFTIN) 500 MG tablet Take 1 tablet (500 mg) by mouth 2 times daily 20 tablet 0     furosemide (LASIX) 20 MG tablet Take 1 tablet (20 mg) by mouth daily 90 tablet 3     warfarin (JANTOVEN) 5 MG tablet Take 7.5 mg on Tuesday, Thursday, Saturday and 5 mg the rest of the week or as directed by coumadin clinic 105 tablet 0     lisinopril (PRINIVIL,ZESTRIL) 10 MG tablet Take 1 tablet (10 mg) by mouth daily 90 tablet 2     carvedilol (COREG) 12.5 MG tablet Take 1 tablet (12.5 mg) by mouth 2 times daily (with meals) 180 tablet 2     simvastatin (ZOCOR) 80 MG tablet Take 1 tablet (80 mg) by mouth daily 90 tablet 3     Omega-3 Fatty Acids (OMEGA-3 FISH OIL PO) Take 1 g by mouth daily       aspirin 81 MG tablet Take 1 tablet by mouth daily.           --------------------------------------------------------------------------------------------------------------------                          REVIEW OF SYSTEMS:         LUNGS: Pt denies: cough,excess sputum, hemoptysis, or shortness of breath at rest..   HEART: Pt denies: chest pain, arrythmia, syncope, tachy or bradyarrhythmia .   GI: Pt denies: nausea, vomitting, diarrhea, constipation, melena, or  hematochezia.   NEURO: Pt denies: seizures, strokes, diplopia, weakness, paraesthesias, or paralysis.   SKIN: Pt denies: itching, rashes, discoloration, or specific lesions of concern. Denies recent hair loss.                          EXAMINATION:         BP 92/60 (BP Location: Left arm, Patient Position: Chair, Cuff Size: Adult Large)  Pulse 60  Temp 97.5  F (36.4  C) (Temporal)  Wt 212 lb (96.2 kg)  SpO2 (!) 88%  BMI 27.97 kg/m2   Constitutional: The patient appears to be in no acute distress. The patient appears to be adequately hydrated. No acute respiratory or hemodynamic distress is noted at this time.   LUNGS: Breath sounds are decreased with some dependent rales noted bilaterally, airflow is brisk, no intercostal retraction or stridor is noted. No coughing is noted during visit.   HEART:  Irregularly irregular without rubs, clicks, gallops, or murmurs. PMI is nondisplaced. Upstrokes are brisk. S1,S2 are heard.   GI: Abdomen is soft, without rebound, guarding or tenderness. Bowel sounds are appropriate. No renal bruits are heard.    NEURO: Pt is alert and appropriate. No neurologic lateralization is noted. Cranial nerves 2-12 are intact. Peripheral sensory and motor function are grossly normal                        DECISION MAKIN. Pneumonia of right lower lobe due to infectious organism  Include antibiotics  Recheck chest x-ray in 1-2 weeks    2. Primary cardiomyopathy (H)  Continue current therapy with diuretic, ACE inhibitor, Coreg    3. Coronary artery disease involving native coronary artery of native heart without angina pectoris  Continue aspirin, simvastatin, beta blocker    4. Chronic atrial fibrillation (H)  Continue anticoagulation and follow INR                               FOLLOW UP    I have asked the patient to make an appointment for follow up with me in a couple weeks          I have carefully explained the diagnosis and treatment options with the patient. The patient has  displayed an understanding of the above, and all subsequent questions were answered.         DO TETE Fierro    Portions of this note were produced using [employer]  Although every attempt at real-time proof reading has been made, occasional grammar/syntax errors may have been missed.

## 2017-04-26 DIAGNOSIS — I25.10 CORONARY ARTERY DISEASE INVOLVING NATIVE CORONARY ARTERY WITHOUT ANGINA PECTORIS: ICD-10-CM

## 2017-04-26 RX ORDER — LISINOPRIL 10 MG/1
10 TABLET ORAL DAILY
Qty: 90 TABLET | Refills: 2 | Status: SHIPPED | OUTPATIENT
Start: 2017-04-26 | End: 2018-01-29

## 2017-05-09 DIAGNOSIS — I25.10 CORONARY ARTERY DISEASE INVOLVING NATIVE CORONARY ARTERY WITHOUT ANGINA PECTORIS: ICD-10-CM

## 2017-05-09 RX ORDER — CARVEDILOL 12.5 MG/1
12.5 TABLET ORAL 2 TIMES DAILY WITH MEALS
Qty: 180 TABLET | Refills: 2 | Status: SHIPPED | OUTPATIENT
Start: 2017-05-09 | End: 2018-01-29

## 2017-05-12 ENCOUNTER — OFFICE VISIT (OUTPATIENT)
Dept: FAMILY MEDICINE | Facility: OTHER | Age: 82
End: 2017-05-12
Payer: COMMERCIAL

## 2017-05-12 ENCOUNTER — RADIANT APPOINTMENT (OUTPATIENT)
Dept: GENERAL RADIOLOGY | Facility: OTHER | Age: 82
End: 2017-05-12
Attending: INTERNAL MEDICINE
Payer: COMMERCIAL

## 2017-05-12 VITALS
TEMPERATURE: 96.9 F | BODY MASS INDEX: 28.63 KG/M2 | WEIGHT: 217 LBS | HEART RATE: 85 BPM | DIASTOLIC BLOOD PRESSURE: 80 MMHG | SYSTOLIC BLOOD PRESSURE: 118 MMHG | OXYGEN SATURATION: 93 %

## 2017-05-12 DIAGNOSIS — J18.9 PNEUMONIA DUE TO INFECTIOUS ORGANISM, UNSPECIFIED LATERALITY, UNSPECIFIED PART OF LUNG: ICD-10-CM

## 2017-05-12 DIAGNOSIS — I10 HYPERTENSION GOAL BP (BLOOD PRESSURE) < 140/90: Primary | ICD-10-CM

## 2017-05-12 DIAGNOSIS — I48.20 CHRONIC ATRIAL FIBRILLATION (H): ICD-10-CM

## 2017-05-12 LAB
ANION GAP SERPL CALCULATED.3IONS-SCNC: 5 MMOL/L (ref 3–14)
BUN SERPL-MCNC: 20 MG/DL (ref 7–30)
CALCIUM SERPL-MCNC: 8.6 MG/DL (ref 8.5–10.1)
CHLORIDE SERPL-SCNC: 105 MMOL/L (ref 94–109)
CO2 SERPL-SCNC: 31 MMOL/L (ref 20–32)
CREAT SERPL-MCNC: 0.92 MG/DL (ref 0.66–1.25)
GFR SERPL CREATININE-BSD FRML MDRD: 78 ML/MIN/1.7M2
GLUCOSE SERPL-MCNC: 114 MG/DL (ref 70–99)
POTASSIUM SERPL-SCNC: 4.4 MMOL/L (ref 3.4–5.3)
SODIUM SERPL-SCNC: 141 MMOL/L (ref 133–144)

## 2017-05-12 PROCEDURE — 71020 XR CHEST 2 VW: CPT

## 2017-05-12 PROCEDURE — 36415 COLL VENOUS BLD VENIPUNCTURE: CPT | Performed by: INTERNAL MEDICINE

## 2017-05-12 PROCEDURE — 80048 BASIC METABOLIC PNL TOTAL CA: CPT | Performed by: INTERNAL MEDICINE

## 2017-05-12 PROCEDURE — 99213 OFFICE O/P EST LOW 20 MIN: CPT | Performed by: INTERNAL MEDICINE

## 2017-05-12 ASSESSMENT — PAIN SCALES - GENERAL: PAINLEVEL: NO PAIN (0)

## 2017-05-12 NOTE — MR AVS SNAPSHOT
After Visit Summary   5/12/2017    Kevin Partida    MRN: [id number]           Patient Information     Date Of Birth          1935        Visit Information        Provider Department      5/12/2017 9:40 AM Chad Gee DO [facility]        Today's Diagnoses     Hypertension goal BP (blood pressure) < 140/90    -  1    Pneumonia due to infectious organism, unspecified laterality, unspecified part of lung        Chronic atrial fibrillation (H)           Follow-ups after your visit        Your next 10 appointments already scheduled     Jun 29, 2017  9:30 AM CDT   Anticoagulation Visit with  ANTI [facility]AG   [facility] ([facility]    [address] [facility] [address] [phone]              Who to contact     If you have questions or need follow up information about today's clinic visit or your schedule please contact [facility] directly at [phone].  Normal or non-critical lab and imaging results will be communicated to you by MyChart, letter or phone within 4 business days after the clinic has received the results. If you do not hear from us within 7 days, please contact the clinic through [employer] or phone. If you have a critical or abnormal lab result, we will notify you by phone as soon as possible.  Submit refill requests through [employer] or call your pharmacy and they will forward the refill request to us. Please allow 3 business days for your refill to be completed.          Additional Information About Your Visit        MyChart Information     [employer] gives you secure access to your electronic health record. If you see a primary care provider, you can also send messages to your care team and make appointments. If you have questions, please call your primary care clinic.  If you do not have a primary care provider, please call [phone] and they will assist you.        Care EveryWhere ID     This is your Care  EveryWhere ID. This could be used by other organizations to access your [place] medical records  [id number]        Your Vitals Were     Pulse Temperature Pulse Oximetry BMI (Body Mass Index)          85 96.9  F (36.1  C) (Temporal) 93% 28.63 kg/m2         Blood Pressure from Last 3 Encounters:   05/12/17 118/80   04/21/17 92/60   04/11/17 120/60    Weight from Last 3 Encounters:   05/12/17 217 lb (98.4 kg)   04/21/17 212 lb (96.2 kg)   04/11/17 215 lb 12.8 oz (97.9 kg)              We Performed the Following     Basic metabolic panel        Primary Care Provider Office Phone # Fax #    Chad Librado Gee [phone]       [address] [place] [address]        Thank you!     Thank you for choosing [facility]  for your care. Our goal is always to provide you with excellent care. Hearing back from our patients is one way we can continue to improve our services. Please take a few minutes to complete the written survey that you may receive in the mail after your visit with us. Thank you!             Your Updated Medication List - Protect others around you: Learn how to safely use, store and throw away your medicines at www.disposemymeds.org.          This list is accurate as of: 5/12/17 11:59 PM.  Always use your most recent med list.                   Brand Name Dispense Instructions for use    aspirin 81 MG tablet      Take 1 tablet by mouth daily.       carvedilol 12.5 MG tablet    COREG    180 tablet    Take 1 tablet (12.5 mg) by mouth 2 times daily (with meals)       furosemide 20 MG tablet    LASIX    90 tablet    Take 1 tablet (20 mg) by mouth daily       lisinopril 10 MG tablet    PRINIVIL/ZESTRIL    90 tablet    Take 1 tablet (10 mg) by mouth daily       OMEGA-3 FISH OIL PO      Take 1 g by mouth daily       simvastatin 80 MG tablet    ZOCOR    90 tablet    Take 1 tablet (80 mg) by mouth daily       warfarin 5 MG tablet    JANTOVEN    105 tablet     Take 7.5 mg on Tuesday, Thursday, Saturday and 5 mg the rest of the week or as directed by coumadin clinic

## 2017-05-12 NOTE — NURSING NOTE
"Chief Complaint   Patient presents with     RECHECK       Initial /80 (BP Location: Left arm, Patient Position: Chair, Cuff Size: Adult Large)  Pulse 85  Temp 96.9  F (36.1  C) (Temporal)  Wt 217 lb (98.4 kg)  SpO2 93%  BMI 28.63 kg/m2 Estimated body mass index is 28.63 kg/(m^2) as calculated from the following:    Height as of 4/11/17: 6' 1\" (1.854 m).    Weight as of this encounter: 217 lb (98.4 kg).  Medication Reconciliation: complete    Kalpana SINGER    "

## 2017-05-12 NOTE — PROGRESS NOTES
Chief Complaint   Patient presents with     RECHECK     CHIEF COMPLAINT:    The patient is a pleasant 82-year-old woman who presented for follow-up of his pneumonia. He is regaining his strength, his breathing has improved significantly. He denies any significant cough or excess sputum production. Denies fever or chills. Is now taking food and fluid adequately without compromise. His blood pressures have been well controlled, he is compliant with his medication. He has a long-standing history of chronic atrial fibrillation and has had no syncope or near syncope.                         PAST, FAMILY,SOCIAL HISTORY:     Medical  History:   has a past medical history of Atrial fibrillation (H) (04/14/07); Atrial flutter (H); Coronary artery disease; Headache(784.0); Hepatitis, unspecified; Hyperlipidaemia; Hypertension; Measles without mention of complication; Mumps without mention of complication; Orchitis and epididymitis, unspecified; Other and unspecified hyperlipidemia; Other emphysema (H); Other speech disturbance; Pneumonia, organism unspecified; Shortness of breath; Urinary obstruction; and Varicella without mention of complication.     Surgical History:   has a past surgical history that includes LAPAROSCOPY, SURGICAL; CHOLECYSTECTOMY (1998); REMOVAL OF TONSILS,<11 Y/O; Colonoscopy w/wo [place] **Performed** (05/08/06); laminect/discectomy, lumbar (03/26/08); and Colonoscopy (9/13/2011).     Social History:   reports that he has quit smoking. His smoking use included Cigarettes. He smoked 0.50 packs per day. He has never used smokeless tobacco. He reports that he drinks alcohol. He reports that he does not use illicit drugs.     Family History:  family history includes Alzheimer Disease in his mother; CANCER in his father; CEREBROVASCULAR DISEASE in his mother and paternal uncle; DIABETES in his maternal aunt, mother, and another family member; EYE* in his mother; Genetic Disorder in his maternal grandmother;  HEART DISEASE in his paternal uncle; Neurologic Disorder in his paternal uncle.            MEDICATIONS  Current Outpatient Prescriptions   Medication Sig Dispense Refill     carvedilol (COREG) 12.5 MG tablet Take 1 tablet (12.5 mg) by mouth 2 times daily (with meals) 180 tablet 2     lisinopril (PRINIVIL/ZESTRIL) 10 MG tablet Take 1 tablet (10 mg) by mouth daily 90 tablet 2     furosemide (LASIX) 20 MG tablet Take 1 tablet (20 mg) by mouth daily 90 tablet 3     warfarin (JANTOVEN) 5 MG tablet Take 7.5 mg on Tuesday, Thursday, Saturday and 5 mg the rest of the week or as directed by coumadin clinic 105 tablet 0     simvastatin (ZOCOR) 80 MG tablet Take 1 tablet (80 mg) by mouth daily 90 tablet 3     Omega-3 Fatty Acids (OMEGA-3 FISH OIL PO) Take 1 g by mouth daily       aspirin 81 MG tablet Take 1 tablet by mouth daily.           --------------------------------------------------------------------------------------------------------------------                          REVIEW OF SYSTEMS:         LUNGS: Pt denies: cough,excess sputum, hemoptysis, or shortness of breath.   HEART: Pt denies: chest pain, arrythmia, syncope, tachy or bradyarrhythmia or excess edema.   GI: Pt denies: nausea, vomitting, diarrhea, constipation, melena, or hematochezia.   NEURO: Pt denies: seizures, strokes, diplopia, weakness, paraesthesias, or paralysis.                          EXAMINATION:         /80 (BP Location: Left arm, Patient Position: Chair, Cuff Size: Adult Large)  Pulse 85  Temp 96.9  F (36.1  C) (Temporal)  Wt 217 lb (98.4 kg)  SpO2 93%  BMI 28.63 kg/m2   Constitutional: The patient appears to be in no acute distress. The patient appears to be adequately hydrated. No acute respiratory or hemodynamic distress is noted at this time.   LUNGS: clear bilaterally, airflow is brisk, no intercostal retraction or stridor is noted. No coughing is noted during visit.   HEART:  Irregularly irregular without rubs, clicks,  gallops, or murmurs. PMI is nondisplaced. Upstrokes are brisk. S1,S2 are heard.   GI: Abdomen is soft, without rebound, guarding or tenderness. Bowel sounds are appropriate. No renal bruits are heard.                         DECISION MAKIN. Hypertension goal BP (blood pressure) < 140/90  Continue current medications and check renal function  - Basic metabolic panel    2. Pneumonia due to infectious organism, unspecified laterality, unspecified part of lung  Check follow-up chest x-ray for total clearance  - XR Chest 2 Views; Future    3. Chronic atrial fibrillation (H)  Continue anticoagulation and follow INR closely                               FOLLOW UP    I have asked the patient to make an appointment for follow up with me in 4 months or sooner as needed        I have carefully explained the diagnosis and treatment options with the patient. The patient has displayed an understanding of the above, and all subsequent questions were answered.         DO TETE Fierro    Portions of this note were produced using [employer]  Although every attempt at real-time proof reading has been made, occasional grammar/syntax errors may have been missed.

## 2017-05-18 ENCOUNTER — ANTICOAGULATION THERAPY VISIT (OUTPATIENT)
Dept: ANTICOAGULATION | Facility: OTHER | Age: 82
End: 2017-05-18
Payer: COMMERCIAL

## 2017-05-18 DIAGNOSIS — Z79.01 LONG-TERM (CURRENT) USE OF ANTICOAGULANTS: ICD-10-CM

## 2017-05-18 LAB — INR POINT OF CARE: 3.3 (ref 0.86–1.14)

## 2017-05-18 PROCEDURE — 99207 ZZC NO CHARGE NURSE ONLY: CPT

## 2017-05-18 PROCEDURE — 85610 PROTHROMBIN TIME: CPT | Mod: QW

## 2017-05-18 PROCEDURE — 36416 COLLJ CAPILLARY BLOOD SPEC: CPT

## 2017-05-18 NOTE — MR AVS SNAPSHOT
Kevin Partida   5/18/2017 9:30 AM   Anticoagulation Therapy Visit    Description:  82 year old male   Provider:  ARNIE [facility]TI [facility]   Department:  [facility]           INR as of 5/18/2017     Today's INR 3.3!      Anticoagulation Summary as of 5/18/2017     INR goal 2.0-3.0   Today's INR 3.3!   Full instructions 7.5 mg on Tue, Thu, Sat; 5 mg all other days   Next INR check 6/29/2017    Indications   Long-term (current) use of anticoagulants [Z79.01] [Z79.01]         Your next Anticoagulation Clinic appointment(s)     May 18, 2017  9:30 AM CDT   Anticoagulation Visit with  ANTI COAG   [facility] ([facility]    [address] [facility] [address]   [phone]            Jun 29, 2017  9:30 AM CDT   Anticoagulation Visit with  ANTI COAG   [facility] ([facility])    [address] [facility] [address]   [phone]              Contact Numbers     Clinic Number:         May 2017 Details    Sun Mon Tue Wed Thu Fri Sat      1               2               3               4               5               6                 7               8               9               10               11               12               13                 14               15               16               17               18      7.5 mg   See details      19      5 mg         20      7.5 mg           21      5 mg         22      5 mg         23      7.5 mg         24      5 mg         25      7.5 mg         26      5 mg         27      7.5 mg           28      5 mg         29      5 mg         30      7.5 mg         31      5 mg             Date Details   05/18 This INR check               How to take your warfarin dose     To take:  5 mg Take 1 of the 5 mg tablets.    To take:  7.5 mg Take 1.5 of the 5 mg tablets.           June 2017 Details    Sun Mon Tue Wed Thu Fri Sat         1      7.5 mg         2      5 mg         3      7.5 mg           4      5 mg         5      5 mg          6      7.5 mg         7      5 mg         8      7.5 mg         9      5 mg         10      7.5 mg           11      5 mg         12      5 mg         13      7.5 mg         14      5 mg         15      7.5 mg         16      5 mg         17      7.5 mg           18      5 mg         19      5 mg         20      7.5 mg         21      5 mg         22      7.5 mg         23      5 mg         24      7.5 mg           25      5 mg         26      5 mg         27      7.5 mg         28      5 mg         29            30                 Date Details   No additional details    Date of next INR:  6/29/2017         How to take your warfarin dose     To take:  5 mg Take 1 of the 5 mg tablets.    To take:  7.5 mg Take 1.5 of the 5 mg tablets.

## 2017-05-18 NOTE — PROGRESS NOTES
ANTICOAGULATION FOLLOW-UP CLINIC VISIT    Patient Name:  Kevin Partida  Date:  5/18/2017  Contact Type:  Face to Face    SUBJECTIVE:     Patient Findings     Positives No Problem Findings    Comments Kevin will add an extra green in his diet today and tomorrow. Diego Almanzar RN, BSN             OBJECTIVE    INR Protime   Date Value Ref Range Status   05/18/2017 3.3 (A) 0.86 - 1.14 Final       ASSESSMENT / PLAN  INR assessment THER    Recheck INR In: 6 WEEKS    INR Location Clinic      Anticoagulation Summary as of 5/18/2017     INR goal 2.0-3.0   Today's INR 3.3!   Maintenance plan 7.5 mg (5 mg x 1.5) on Tue, Thu, Sat; 5 mg (5 mg x 1) all other days   Full instructions 7.5 mg on Tue, Thu, Sat; 5 mg all other days   Weekly total 42.5 mg   No change documented Diego Almanzar RN   Plan last modified Diego Almanzar RN (3/14/2016)   Next INR check 6/29/2017   Target end date     Indications   Long-term (current) use of anticoagulants [Z79.01] [Z79.01]         Anticoagulation Episode Summary     INR check location     Preferred lab     Send INR reminders to [facility]    Comments       Anticoagulation Care Providers     Provider Role Specialty Phone number    Taqueria Borrego MD [facility] [phone]            See the Encounter Report to view Anticoagulation Flowsheet and Dosing Calendar (Go to Encounters tab in chart review, and find the Anticoagulation Therapy Visit)    Dosage adjustment made based on physician directed care plan.    Diego Almanzar RN

## 2017-05-22 NOTE — PROGRESS NOTES
Dear Kevin, your recent test results are attached.  The chest x-ray demonstrates no significant change from previous tests.    Feel free to contact me via the office or My Chart if you have any questions regarding the above.  Sincerely,  Chad Gee DO [facility]

## 2017-05-22 NOTE — PROGRESS NOTES
Dear Kevin, your recent test results are attached.  The blood sugar was slightly elevated at 114.  Kidney function looks good.    Feel free to contact me via the office or My Chart if you have any questions regarding the above.  Sincerely,  DO GABBY FierroOI

## 2017-06-23 DIAGNOSIS — I48.20 CHRONIC ATRIAL FIBRILLATION (H): ICD-10-CM

## 2017-06-23 RX ORDER — WARFARIN SODIUM 5 MG/1
TABLET ORAL
Qty: 105 TABLET | Refills: 0 | Status: SHIPPED | OUTPATIENT
Start: 2017-06-23 | End: 2017-09-14

## 2017-06-23 NOTE — TELEPHONE ENCOUNTER
jantoven 5mg    Last Written Prescription Date: 3/30/17  Last Fill Qty: 105, # refills: 0  Last Office Visit with [facility], [facility] or [facility] prescribing provider: 5/12/17  Next 5 appointments (look out 90 days)     Jun 30, 2017 10:00 AM CDT   [facility] Visit with Chad Gee DO   [facility]    [address] [facility] [address] [phone]                   Date and Result of Last PT/INR:   Lab Results   Component Value Date    INR 3.3 05/18/2017    INR 2.4 04/06/2017    INR 2.66 09/28/2016    INR 1.03 05/31/2011

## 2017-06-29 ENCOUNTER — ANTICOAGULATION THERAPY VISIT (OUTPATIENT)
Dept: ANTICOAGULATION | Facility: OTHER | Age: 82
End: 2017-06-29
Payer: COMMERCIAL

## 2017-06-29 DIAGNOSIS — Z79.01 LONG-TERM (CURRENT) USE OF ANTICOAGULANTS: ICD-10-CM

## 2017-06-29 LAB — INR POINT OF CARE: 2.8 (ref 0.86–1.14)

## 2017-06-29 PROCEDURE — 36416 COLLJ CAPILLARY BLOOD SPEC: CPT

## 2017-06-29 PROCEDURE — 99207 ZZC NO CHARGE NURSE ONLY: CPT

## 2017-06-29 PROCEDURE — 85610 PROTHROMBIN TIME: CPT | Mod: QW

## 2017-06-29 NOTE — MR AVS SNAPSHOT
Kevin Partida   6/29/2017 9:30 AM   Anticoagulation Therapy Visit    Description:  82 year old male   Provider:  ARNIE ANTI COAG   Department:  [facility]           INR as of 6/29/2017     Today's INR 2.8      Anticoagulation Summary as of 6/29/2017     INR goal 2.0-3.0   Today's INR 2.8   Full instructions 7.5 mg on Tue, Thu, Sat; 5 mg all other days   Next INR check 8/10/2017    Indications   Long-term (current) use of anticoagulants [Z79.01] [Z79.01]         Your next Anticoagulation Clinic appointment(s)     Aug 10, 2017  9:30 AM CDT   Anticoagulation Visit with ARNIE ANTI VINCE   [facility]    [address] [facility] [id number] [phone]              Contact Numbers     Clinic Number:         June 2017 Details    Sun Mon Tue Wed Thu Fri Sat         1               2               3                 4               5               6               7               8               9               10                 11               12               13               14               15               16               17                 18               19               20               21               22               23               24                 25               26               27               28               29      7.5 mg   See details      30      5 mg           Date Details   06/29 This INR check               How to take your warfarin dose     To take:  5 mg Take 1 of the 5 mg tablets.    To take:  7.5 mg Take 1.5 of the 5 mg tablets.           July 2017 Details    Sun Mon Tue Wed Thu Fri Sat           1      7.5 mg           2      5 mg         3      5 mg         4      7.5 mg         5      5 mg         6      7.5 mg         7      5 mg         8      7.5 mg           9      5 mg         10      5 mg         11      7.5 mg         12      5 mg         13      7.5 mg         14      5 mg         15      7.5 mg           16      5 mg         17      5  mg         18      7.5 mg         19      5 mg         20      7.5 mg         21      5 mg         22      7.5 mg           23      5 mg         24      5 mg         25      7.5 mg         26      5 mg         27      7.5 mg         28      5 mg         29      7.5 mg           30      5 mg         31      5 mg               Date Details   No additional details            How to take your warfarin dose     To take:  5 mg Take 1 of the 5 mg tablets.    To take:  7.5 mg Take 1.5 of the 5 mg tablets.           August 2017 Details    Sun Mon Tue Wed Thu Fri Sat       1      7.5 mg         2      5 mg         3      7.5 mg         4      5 mg         5      7.5 mg           6      5 mg         7      5 mg         8      7.5 mg         9      5 mg         10            11               12                 13               14               15               16               17               18               19                 20               21               22               23               24               25               26                 27               28               29               30               31                  Date Details   No additional details    Date of next INR:  8/10/2017         How to take your warfarin dose     To take:  5 mg Take 1 of the 5 mg tablets.    To take:  7.5 mg Take 1.5 of the 5 mg tablets.

## 2017-06-29 NOTE — PROGRESS NOTES
ANTICOAGULATION FOLLOW-UP CLINIC VISIT    Patient Name:  Kevin Partida  Date:  6/29/2017  Contact Type:  Face to Face    SUBJECTIVE:     Patient Findings     Positives No Problem Findings           OBJECTIVE    INR Protime   Date Value Ref Range Status   06/29/2017 2.8 (A) 0.86 - 1.14 Final       ASSESSMENT / PLAN  INR assessment THER    Recheck INR In: 6 WEEKS    INR Location Clinic      Anticoagulation Summary as of 6/29/2017     INR goal 2.0-3.0   Today's INR 2.8   Maintenance plan 7.5 mg (5 mg x 1.5) on Tue, Thu, Sat; 5 mg (5 mg x 1) all other days   Full instructions 7.5 mg on Tue, Thu, Sat; 5 mg all other days   Weekly total 42.5 mg   No change documented Diego Almanzar RN   Plan last modified Diego Almanzar RN (3/14/2016)   Next INR check 8/10/2017   Target end date     Indications   Long-term (current) use of anticoagulants [Z79.01] [Z79.01]         Anticoagulation Episode Summary     INR check location     Preferred lab     Send INR reminders to [facility]    Comments       Anticoagulation Care Providers     Provider Role Specialty Phone number    Taqueria Borrego MD [facility] [phone]            See the Encounter Report to view Anticoagulation Flowsheet and Dosing Calendar (Go to Encounters tab in chart review, and find the Anticoagulation Therapy Visit)    Dosage adjustment made based on physician directed care plan.    Diego Almanzar RN

## 2017-06-30 ENCOUNTER — OFFICE VISIT (OUTPATIENT)
Dept: FAMILY MEDICINE | Facility: OTHER | Age: 82
End: 2017-06-30
Payer: COMMERCIAL

## 2017-06-30 VITALS
SYSTOLIC BLOOD PRESSURE: 96 MMHG | HEART RATE: 89 BPM | BODY MASS INDEX: 29.14 KG/M2 | OXYGEN SATURATION: 95 % | TEMPERATURE: 97.1 F | DIASTOLIC BLOOD PRESSURE: 58 MMHG | WEIGHT: 220.9 LBS

## 2017-06-30 DIAGNOSIS — R19.7 DIARRHEA OF PRESUMED INFECTIOUS ORIGIN: ICD-10-CM

## 2017-06-30 DIAGNOSIS — G56.23 LESIONS OF BOTH ULNAR NERVES: ICD-10-CM

## 2017-06-30 DIAGNOSIS — I48.20 CHRONIC ATRIAL FIBRILLATION (H): Primary | ICD-10-CM

## 2017-06-30 PROCEDURE — 99214 OFFICE O/P EST MOD 30 MIN: CPT | Performed by: INTERNAL MEDICINE

## 2017-06-30 ASSESSMENT — PAIN SCALES - GENERAL: PAINLEVEL: NO PAIN (0)

## 2017-06-30 NOTE — PROGRESS NOTES
Chief Complaint   Patient presents with     Recheck Medication     CHIEF COMPLAINT:    The patient is a pleasant 82-year-old gentleman who presents today with his [facility] results. He is quite alarmed at his renal function. He is also quite alarmed with his cholesterol status. Notably, they are both excellent but [facility] has decided to place the scale of results on the result sheet. The patient subsequently read that and assumed that his GFR of greater than 60 applied to the GFR of less than 30 criteria and he was in serious renal failure. I've explained to the patient the significance of the tests as well as the syntax of the report. He is quite a bit happier with this result. All of his lab work was within normal limits including his cholesterol status, electrolytes, and renal function. He does complain of some tingling and burning in his little and ring finger of both hands when he sleeps or if he keeps his elbows bent for an extended period of time. 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 I recommended six-inch Ace wraps before going to bed if necessary. Additionally, he has had intermittent diarrhea ever since his previous pneumonia. He questions whether the pneumonia could cause diarrhea. I suspect that the antibiotics could. We will check him for Clostridium difficile. He denies any melena or hematochezia.                         PAST, FAMILY,SOCIAL HISTORY:     Medical  History:   has a past medical history of Atrial fibrillation (H) (04/14/07); Atrial flutter (H); Coronary artery disease; Headache(784.0); Hepatitis, unspecified; Hyperlipidaemia; Hypertension; Measles without mention of complication; Mumps without mention of complication; Orchitis and epididymitis, unspecified; Other and unspecified hyperlipidemia; Other emphysema (H); Other speech disturbance; Pneumonia, organism; Shortness of breath; Urinary obstruction; and Varicella without mention of  complication.     Surgical History:   has a past surgical history that includes LAPAROSCOPY, SURGICAL; CHOLECYSTECTOMY (1998); REMOVAL OF TONSILS,<11 Y/O; Colonoscopy w/wo [place] **Performed** (05/08/06); laminect/discectomy, lumbar (03/26/08); and Colonoscopy (9/13/2011).     Social History:   reports that he has quit smoking. His smoking use included Cigarettes. He smoked 0.50 packs per day. He has never used smokeless tobacco. He reports that he drinks alcohol. He reports that he does not use illicit drugs.     Family History:  family history includes Alzheimer Disease in his mother; CANCER in his father; CEREBROVASCULAR DISEASE in his mother and paternal uncle; DIABETES in his maternal aunt, mother, and another family member; EYE* in his mother; Genetic Disorder in his maternal grandmother; HEART DISEASE in his paternal uncle; Neurologic Disorder in his paternal uncle.            MEDICATIONS  Current Outpatient Prescriptions   Medication Sig Dispense Refill     warfarin (JANTOVEN) 5 MG tablet Take 7.5 mg on Tuesday, Thursday, Saturday and 5 mg the rest of the week or as directed by coumadin clinic 105 tablet 0     carvedilol (COREG) 12.5 MG tablet Take 1 tablet (12.5 mg) by mouth 2 times daily (with meals) 180 tablet 2     lisinopril (PRINIVIL/ZESTRIL) 10 MG tablet Take 1 tablet (10 mg) by mouth daily 90 tablet 2     furosemide (LASIX) 20 MG tablet Take 1 tablet (20 mg) by mouth daily 90 tablet 3     simvastatin (ZOCOR) 80 MG tablet Take 1 tablet (80 mg) by mouth daily 90 tablet 3     Omega-3 Fatty Acids (OMEGA-3 FISH OIL PO) Take 1 g by mouth daily       aspirin 81 MG tablet Take 1 tablet by mouth daily.           --------------------------------------------------------------------------------------------------------------------                Review of Systems     LUNGS: Pt denies: cough,excess sputum, hemoptysis, or shortness of breath.   HEART: Pt denies: chest pain,symptomatic arrythmia, syncope, tachy or  bradyarrhythmia.   GI: Pt denies: nausea, vomitting, constipation, melena, or hematochezia. He has has some persistent diarrhea.  He notes this has been going on now for several weeks to months.  He has one or two episodes per day and it is generally foul-smelling.   NEURO: Pt denies: seizures, strokes, diplopia, weakness, paraesthesias, or paralysis. Patient does have symptoms consistent with bilateral older neuropraxia.  This is worse when he keeps his arms been for an extended period of time.                        Examination       Vital Signs:  B/P: 96/58, T: 97.1, P: 89, R: Data Unavailable, BMI: Body mass index is 29.14 kg/(m^2).   Constitutional: The patient appears to be in no acute distress. The patient appears to be adequately hydrated. No acute respiratory or hemodynamic distress is noted at this time.   LUNGS: clear bilaterally, airflow is brisk, no intercostal retraction or stridor is noted. No coughing is noted during visit.   HEART:  Irregularly irregular without rubs, clicks, gallops, or murmurs. PMI is nondisplaced. Upstrokes are brisk. S1,S2 are heard.   GI: Abdomen is soft, without rebound, guarding or tenderness. Bowel sounds are appropriate. No renal bruits are heard.                        Decision-Making          1. Chronic atrial fibrillation (H)  Continue chronic anticoagulation  - INR CLINIC REFERRAL    2. Lesions of both ulnar nerves  Recommend night splints to be used  On the elbows    3. Diarrhea of presumed infectious origin  Will check for Clostridium given the nature of his diarrhea  - Clostridium difficile Toxin B PCR; Future                           FOLLOW UP   I have asked the patient to make an appointment for followup with me in two weeks if the diarrhea does not resolve.          I have carefully explained the diagnosis and treatment options with the patient. The patient has displayed an understanding of the above, and all subsequent questions were answered.                DO TETE Fierro    Portions of this note were produced using [employer]  Although every attempt at real-time proof reading has been made, occasional grammar/syntax errors may have been missed.

## 2017-06-30 NOTE — MR AVS SNAPSHOT
After Visit Summary   6/30/2017    Kevin Partida    MRN: [id number]           Patient Information     Date Of Birth          1935        Visit Information        Provider Department      6/30/2017 10:00 AM Chad Gee DO [facility]        Today's Diagnoses     Chronic atrial fibrillation (H)    -  1    Lesions of both ulnar nerves        Diarrhea of presumed infectious origin           Follow-ups after your visit        Additional Services     INR CLINIC REFERRAL       Your provider has referred you to INR Services.    Please be aware that coverage of these services is subject to the terms and limitations of your health insurance plan.  Call member services at your health plan with any benefit or coverage questions.    Indication for Anticoagulation: Atrial Fibrillation  If nonstandard INR is desired, indicate goal range and explanation:   Expected Duration of Therapy: Lifetime                  Your next 10 appointments already scheduled     Aug 10, 2017  9:30 AM CDT   Anticoagulation Visit with [facility] ANTI COAG   [facility] ([facility])    [address] [facility] [id number] [phone]              Who to contact     If you have questions or need follow up information about today's clinic visit or your schedule please contact [facility] directly at [phone].  Normal or non-critical lab and imaging results will be communicated to you by MyChart, letter or phone within 4 business days after the clinic has received the results. If you do not hear from us within 7 days, please contact the clinic through MyChart or phone. If you have a critical or abnormal lab result, we will notify you by phone as soon as possible.  Submit refill requests through [employer] or call your pharmacy and they will forward the refill request to us. Please allow 3 business days for your refill to be completed.          Additional Information About Your  Visit        MyChart Information     [employer] gives you secure access to your electronic health record. If you see a primary care provider, you can also send messages to your care team and make appointments. If you have questions, please call your primary care clinic.  If you do not have a primary care provider, please call [phone] and they will assist you.        Care EveryWhere ID     This is your Care EveryWhere ID. This could be used by other organizations to access your [facility] medical records  [id number]        Your Vitals Were     Pulse Temperature Pulse Oximetry BMI (Body Mass Index)          89 97.1  F (36.2  C) (Temporal) 95% 29.14 kg/m2         Blood Pressure from Last 3 Encounters:   06/30/17 96/58   05/12/17 118/80   04/21/17 92/60    Weight from Last 3 Encounters:   06/30/17 220 lb 14.4 oz (100.2 kg)   05/12/17 217 lb (98.4 kg)   04/21/17 212 lb (96.2 kg)              We Performed the Following     INR CLINIC REFERRAL        Primary Care Provider Office Phone # Fax #    Chad Librado Gee [phone]       [address]   [facility] [address]        Equal Access to Services     ANTHONY LENZ AH: Hadii aad ku hadasho Soomaali, waaxda luqadaha, qaybta kaalmada adeegyada, waxay idiin hayzainan phyllis qureshi ladenis macdonald. So [facility] [phone].    ATENCIÓN: Si habla español, tiene a owen disposición servicios gratuitos de asistencia lingüística. Llame al [phone].    We comply with applicable federal civil rights laws and [place] laws. We do not discriminate on the basis of race, color, national origin, age, disability sex, sexual orientation or gender identity.            Thank you!     Thank you for choosing [facility]  for your care. Our goal is always to provide you with excellent care. Hearing back from our patients is one way we can continue to improve our services. Please take a few minutes to complete the written survey that you may receive  in the mail after your visit with us. Thank you!             Your Updated Medication List - Protect others around you: Learn how to safely use, store and throw away your medicines at www.disposemymeds.org.          This list is accurate as of: 6/30/17 11:59 PM.  Always use your most recent med list.                   Brand Name Dispense Instructions for use Diagnosis    aspirin 81 MG tablet      Take 1 tablet by mouth daily.        carvedilol 12.5 MG tablet    COREG    180 tablet    Take 1 tablet (12.5 mg) by mouth 2 times daily (with meals)    Coronary artery disease involving native coronary artery without angina pectoris       furosemide 20 MG tablet    LASIX    90 tablet    Take 1 tablet (20 mg) by mouth daily    Ischemic cardiomyopathy       lisinopril 10 MG tablet    PRINIVIL/ZESTRIL    90 tablet    Take 1 tablet (10 mg) by mouth daily    Coronary artery disease involving native coronary artery without angina pectoris       OMEGA-3 FISH OIL PO      Take 1 g by mouth daily        simvastatin 80 MG tablet    ZOCOR    90 tablet    Take 1 tablet (80 mg) by mouth daily    Hyperlipidemia LDL goal <70       warfarin 5 MG tablet    JANTOVEN    105 tablet    Take 7.5 mg on Tuesday, Thursday, Saturday and 5 mg the rest of the week or as directed by coumadin clinic    Chronic atrial fibrillation (H)

## 2017-06-30 NOTE — NURSING NOTE
"Chief Complaint   Patient presents with     Recheck Medication       Initial BP 96/58 (BP Location: Left arm, Patient Position: Chair, Cuff Size: Adult Large)  Pulse 89  Temp 97.1  F (36.2  C) (Temporal)  Wt 220 lb 14.4 oz (100.2 kg)  SpO2 95%  BMI 29.14 kg/m2 Estimated body mass index is 29.14 kg/(m^2) as calculated from the following:    Height as of 4/11/17: 6' 1\" (1.854 m).    Weight as of this encounter: 220 lb 14.4 oz (100.2 kg).  Medication Reconciliation: complete  Kalpana SINGER    "

## 2017-07-03 DIAGNOSIS — R19.7 DIARRHEA OF PRESUMED INFECTIOUS ORIGIN: ICD-10-CM

## 2017-07-03 LAB
C DIFF TOX B STL QL: NORMAL
SPECIMEN SOURCE: NORMAL

## 2017-07-03 PROCEDURE — 87493 C DIFF AMPLIFIED PROBE: CPT | Performed by: INTERNAL MEDICINE

## 2017-07-05 NOTE — PROGRESS NOTES
Dear Kevin, your recent test results are attached.   Your Clostridium difficile test was negative.    Feel free to contact me via the office or My Chart if you have any questions regarding the above.  Sincerely,  DO TETE Fierro

## 2017-08-04 DIAGNOSIS — E78.5 HYPERLIPIDEMIA LDL GOAL <70: ICD-10-CM

## 2017-08-04 RX ORDER — SIMVASTATIN 80 MG
80 TABLET ORAL DAILY
Qty: 90 TABLET | Refills: 2 | Status: SHIPPED | OUTPATIENT
Start: 2017-08-04 | End: 2018-05-02

## 2017-08-10 ENCOUNTER — ANTICOAGULATION THERAPY VISIT (OUTPATIENT)
Dept: ANTICOAGULATION | Facility: OTHER | Age: 82
End: 2017-08-10
Payer: COMMERCIAL

## 2017-08-10 DIAGNOSIS — Z79.01 LONG-TERM (CURRENT) USE OF ANTICOAGULANTS: ICD-10-CM

## 2017-08-10 LAB — INR POINT OF CARE: 3.3 (ref 0.86–1.14)

## 2017-08-10 PROCEDURE — 36416 COLLJ CAPILLARY BLOOD SPEC: CPT

## 2017-08-10 PROCEDURE — 99207 ZZC NO CHARGE NURSE ONLY: CPT

## 2017-08-10 PROCEDURE — 85610 PROTHROMBIN TIME: CPT | Mod: QW

## 2017-08-10 NOTE — MR AVS SNAPSHOT
Kevin Partida   8/10/2017 9:30 AM   Anticoagulation Therapy Visit    Description:  82 year old male   Provider:  ARNIE ANTI COAG   Department:  [facility]           INR as of 8/10/2017     Today's INR 3.3!      Anticoagulation Summary as of 8/10/2017     INR goal 2.0-3.0   Today's INR 3.3!   Full instructions 7.5 mg on Tue, Thu, Sat; 5 mg all other days   Next INR check 9/21/2017    Indications   Long-term (current) use of anticoagulants [Z79.01] [Z79.01]         Your next Anticoagulation Clinic appointment(s)     Sep 21, 2017  9:30 AM CDT   Anticoagulation Visit with ARNIE ANTI VINCE   [facility]    [address] [facility] [address] [phone]              Contact Numbers     Clinic Number:         August 2017 Details    Sun Mon Tue Wed Thu Fri Sat       1               2               3               4               5                 6               7               8               9               10      7.5 mg   See details      11      5 mg         12      7.5 mg           13      5 mg         14      5 mg         15      7.5 mg         16      5 mg         17      7.5 mg         18      5 mg         19      7.5 mg           20      5 mg         21      5 mg         22      7.5 mg         23      5 mg         24      7.5 mg         25      5 mg         26      7.5 mg           27      5 mg         28      5 mg         29      7.5 mg         30      5 mg         31      7.5 mg            Date Details   08/10 This INR check               How to take your warfarin dose     To take:  5 mg Take 1 of the 5 mg tablets.    To take:  7.5 mg Take 1.5 of the 5 mg tablets.           September 2017 Details    Sun Mon Tue Wed Thu Fri Sat          1      5 mg         2      7.5 mg           3      5 mg         4      5 mg         5      7.5 mg         6      5 mg         7      7.5 mg         8      5 mg         9      7.5 mg           10      5 mg         11      5 mg         12       7.5 mg         13      5 mg         14      7.5 mg         15      5 mg         16      7.5 mg           17      5 mg         18      5 mg         19      7.5 mg         20      5 mg         21            22               23                 24               25               26               27               28               29               30                Date Details   No additional details    Date of next INR:  9/21/2017         How to take your warfarin dose     To take:  5 mg Take 1 of the 5 mg tablets.    To take:  7.5 mg Take 1.5 of the 5 mg tablets.

## 2017-08-10 NOTE — PROGRESS NOTES
ANTICOAGULATION FOLLOW-UP CLINIC VISIT    Patient Name:  Kevin Partida  Date:  8/10/2017  Contact Type:  Face to Face    SUBJECTIVE:     Patient Findings     Positives Unexplained INR or factor level change    Comments Pt will add some additional Vit K in his diet. Diego Almanzar RN, BSN             OBJECTIVE    INR Protime   Date Value Ref Range Status   08/10/2017 3.3 (A) 0.86 - 1.14 Final       ASSESSMENT / PLAN  INR assessment SUPRA    Recheck INR In: 6 WEEKS    INR Location Clinic      Anticoagulation Summary as of 8/10/2017     INR goal 2.0-3.0   Today's INR 3.3!   Maintenance plan 7.5 mg (5 mg x 1.5) on Tue, Thu, Sat; 5 mg (5 mg x 1) all other days   Full instructions 7.5 mg on Tue, Thu, Sat; 5 mg all other days   Weekly total 42.5 mg   No change documented Diego Almanzar RN   Plan last modified Diego Almanzar RN (3/14/2016)   Next INR check 9/21/2017   Target end date     Indications   Long-term (current) use of anticoagulants [Z79.01] [Z79.01]         Anticoagulation Episode Summary     INR check location     Preferred lab     Send INR reminders to [facility]    Comments       Anticoagulation Care Providers     Provider Role Specialty Phone number    Taqueria Borrego MD [facility] [phone]            See the Encounter Report to view Anticoagulation Flowsheet and Dosing Calendar (Go to Encounters tab in chart review, and find the Anticoagulation Therapy Visit)    Dosage adjustment made based on physician directed care plan.    Diego Almanzar RN

## 2017-09-14 DIAGNOSIS — I48.20 CHRONIC ATRIAL FIBRILLATION (H): ICD-10-CM

## 2017-09-14 RX ORDER — WARFARIN SODIUM 5 MG/1
TABLET ORAL
Qty: 105 TABLET | Refills: 0 | Status: SHIPPED | OUTPATIENT
Start: 2017-09-14 | End: 2017-12-11

## 2017-09-14 NOTE — TELEPHONE ENCOUNTER
jantoven 5    Last Written Prescription Date: 6/23/17  Last Fill Qty: 105, # refills: 0  Last Office Visit with G, P or [facility] prescribing provider: 6/30/17       Date and Result of Last PT/INR:   Lab Results   Component Value Date    INR 3.3 08/10/2017    INR 2.8 06/29/2017    INR 2.66 09/28/2016    INR 1.03 05/31/2011

## 2017-09-21 ENCOUNTER — ANTICOAGULATION THERAPY VISIT (OUTPATIENT)
Dept: ANTICOAGULATION | Facility: OTHER | Age: 82
End: 2017-09-21
Payer: COMMERCIAL

## 2017-09-21 DIAGNOSIS — Z79.01 LONG-TERM (CURRENT) USE OF ANTICOAGULANTS: ICD-10-CM

## 2017-09-21 LAB — INR POINT OF CARE: 2.9 (ref 0.86–1.14)

## 2017-09-21 PROCEDURE — 99207 ZZC NO CHARGE NURSE ONLY: CPT

## 2017-09-21 PROCEDURE — 85610 PROTHROMBIN TIME: CPT | Mod: QW

## 2017-09-21 PROCEDURE — 36416 COLLJ CAPILLARY BLOOD SPEC: CPT

## 2017-09-21 NOTE — MR AVS SNAPSHOT
Kevin Partida   9/21/2017 9:30 AM   Anticoagulation Therapy Visit    Description:  82 year old male   Provider:  ARNIE ANTI COAG   Department:  [facility]           INR as of 9/21/2017     Today's INR 2.9      Anticoagulation Summary as of 9/21/2017     INR goal 2.0-3.0   Today's INR 2.9   Full instructions 7.5 mg on Tue, Thu, Sat; 5 mg all other days   Next INR check 11/2/2017    Indications   Long-term (current) use of anticoagulants [Z79.01] [Z79.01]         Your next Anticoagulation Clinic appointment(s)     Nov 02, 2017  9:30 AM CDT   Anticoagulation Visit with ARNIE ANTI VINCE   [facility] ([facility]    [address] [facility] [id number] [phone]              Contact Numbers     Clinic Number:         September 2017 Details    Sun Mon Tue Wed Thu Fri Sat          1               2                 3               4               5               6               7               8               9                 10               11               12               13               14               15               16                 17               18               19               20               21      7.5 mg   See details      22      5 mg         23      7.5 mg           24      5 mg         25      5 mg         26      7.5 mg         27      5 mg         28      7.5 mg         29      5 mg         30      7.5 mg          Date Details   09/21 This INR check               How to take your warfarin dose     To take:  5 mg Take 1 of the 5 mg tablets.    To take:  7.5 mg Take 1.5 of the 5 mg tablets.           October 2017 Details    Sun Mon Tue Wed Thu Fri Sat     1      5 mg         2      5 mg         3      7.5 mg         4      5 mg         5      7.5 mg         6      5 mg         7      7.5 mg           8      5 mg         9      5 mg         10      7.5 mg         11      5 mg         12      7.5 mg         13      5 mg         14      7.5 mg           15      5 mg          16      5 mg         17      7.5 mg         18      5 mg         19      7.5 mg         20      5 mg         21      7.5 mg           22      5 mg         23      5 mg         24      7.5 mg         25      5 mg         26      7.5 mg         27      5 mg         28      7.5 mg           29      5 mg         30      5 mg         31      7.5 mg              Date Details   No additional details            How to take your warfarin dose     To take:  5 mg Take 1 of the 5 mg tablets.    To take:  7.5 mg Take 1.5 of the 5 mg tablets.           November 2017 Details    Sun Mon Tue Wed Thu Fri Sat        1      5 mg         2            3               4                 5               6               7               8               9               10               11                 12               13               14               15               16               17               18                 19               20               21               22               23               24               25                 26               27               28               29               30                  Date Details   No additional details    Date of next INR:  11/2/2017         How to take your warfarin dose     To take:  5 mg Take 1 of the 5 mg tablets.    To take:  7.5 mg Take 1.5 of the 5 mg tablets.

## 2017-09-21 NOTE — PROGRESS NOTES
ANTICOAGULATION FOLLOW-UP CLINIC VISIT    Patient Name:  Kevin Partida  Date:  9/21/2017  Contact Type:  Face to Face    SUBJECTIVE:     Patient Findings     Positives No Problem Findings           OBJECTIVE    INR Protime   Date Value Ref Range Status   09/21/2017 2.9 (A) 0.86 - 1.14 Final       ASSESSMENT / PLAN  INR assessment THER    Recheck INR In: 6 WEEKS    INR Location Clinic      Anticoagulation Summary as of 9/21/2017     INR goal 2.0-3.0   Today's INR 2.9   Maintenance plan 7.5 mg (5 mg x 1.5) on Tue, Thu, Sat; 5 mg (5 mg x 1) all other days   Full instructions 7.5 mg on Tue, Thu, Sat; 5 mg all other days   Weekly total 42.5 mg   No change documented Diego Almanzar RN   Plan last modified Diego Almanzar RN (3/14/2016)   Next INR check 11/2/2017   Target end date     Indications   Long-term (current) use of anticoagulants [Z79.01] [Z79.01]         Anticoagulation Episode Summary     INR check location     Preferred lab     Send INR reminders to [facility]    Comments       Anticoagulation Care Providers     Provider Role Specialty Phone number    Taqueria Borrego MD [facility] [phone]            See the Encounter Report to view Anticoagulation Flowsheet and Dosing Calendar (Go to Encounters tab in chart review, and find the Anticoagulation Therapy Visit)    Dosage adjustment made based on physician directed care plan.    Diego Almanzar RN

## 2017-11-02 ENCOUNTER — ANTICOAGULATION THERAPY VISIT (OUTPATIENT)
Dept: ANTICOAGULATION | Facility: OTHER | Age: 82
End: 2017-11-02
Payer: COMMERCIAL

## 2017-11-02 DIAGNOSIS — Z79.01 LONG-TERM (CURRENT) USE OF ANTICOAGULANTS: ICD-10-CM

## 2017-11-02 LAB — INR POINT OF CARE: 3.2 (ref 0.86–1.14)

## 2017-11-02 PROCEDURE — 99207 ZZC NO CHARGE NURSE ONLY: CPT

## 2017-11-02 PROCEDURE — 36416 COLLJ CAPILLARY BLOOD SPEC: CPT

## 2017-11-02 PROCEDURE — 85610 PROTHROMBIN TIME: CPT | Mod: QW

## 2017-11-02 NOTE — MR AVS SNAPSHOT
Brown JD Partida   11/2/2017 9:30 AM   Anticoagulation Therapy Visit    Description:  82 year old male   Provider:  ARNIE ANTI COAG   Department:  [facility]           INR as of 11/2/2017     Today's INR 3.2!      Anticoagulation Summary as of 11/2/2017     INR goal 2.0-3.0   Today's INR 3.2!   Full instructions 7.5 mg on Tue, Thu, Sat; 5 mg all other days   Next INR check 12/14/2017    Indications   Long-term (current) use of anticoagulants [Z79.01] [Z79.01]         Your next Anticoagulation Clinic appointment(s)     Dec 14, 2017  9:45 AM CST   Anticoagulation Visit with ARNIE ANTI VINCE   [facility]    [address] [facility] [id number] [phone]              Contact Numbers     Clinic Number:         November 2017 Details    Sun Mon Tue Wed Thu Fri Sat        1               2      7.5 mg   See details      3      5 mg         4      7.5 mg           5      5 mg         6      5 mg         7      7.5 mg         8      5 mg         9      7.5 mg         10      5 mg         11      7.5 mg           12      5 mg         13      5 mg         14      7.5 mg         15      5 mg         16      7.5 mg         17      5 mg         18      7.5 mg           19      5 mg         20      5 mg         21      7.5 mg         22      5 mg         23      7.5 mg         24      5 mg         25      7.5 mg           26      5 mg         27      5 mg         28      7.5 mg         29      5 mg         30      7.5 mg            Date Details   11/02 This INR check               How to take your warfarin dose     To take:  5 mg Take 1 of the 5 mg tablets.    To take:  7.5 mg Take 1.5 of the 5 mg tablets.           December 2017 Details    Sun Mon Tue Wed Thu Fri Sat          1      5 mg         2      7.5 mg           3      5 mg         4      5 mg         5      7.5 mg         6      5 mg         7      7.5 mg         8      5 mg         9      7.5 mg           10      5 mg         11       5 mg         12      7.5 mg         13      5 mg         14            15               16                 17               18               19               20               21               22               23                 24               25               26               27               28               29               30                 31                      Date Details   No additional details    Date of next INR:  12/14/2017         How to take your warfarin dose     To take:  5 mg Take 1 of the 5 mg tablets.    To take:  7.5 mg Take 1.5 of the 5 mg tablets.

## 2017-11-02 NOTE — PROGRESS NOTES
ANTICOAGULATION FOLLOW-UP CLINIC VISIT    Patient Name:  Kevin Partida  Date:  11/2/2017  Contact Type:  Face to Face    SUBJECTIVE:     Patient Findings     Positives No Problem Findings           OBJECTIVE    INR Protime   Date Value Ref Range Status   11/02/2017 3.2 (A) 0.86 - 1.14 Final       ASSESSMENT / PLAN  INR assessment THER    Recheck INR In: 6 WEEKS    INR Location Clinic      Anticoagulation Summary as of 11/2/2017     INR goal 2.0-3.0   Today's INR 3.2!   Maintenance plan 7.5 mg (5 mg x 1.5) on Tue, Thu, Sat; 5 mg (5 mg x 1) all other days   Full instructions 7.5 mg on Tue, Thu, Sat; 5 mg all other days   Weekly total 42.5 mg   No change documented Diego Almanzar RN   Plan last modified Diego Almanzar RN (3/14/2016)   Next INR check 12/14/2017   Target end date     Indications   Long-term (current) use of anticoagulants [Z79.01] [Z79.01]         Anticoagulation Episode Summary     INR check location     Preferred lab     Send INR reminders to [facility]    Comments       Anticoagulation Care Providers     Provider Role Specialty Phone number    Taqueria Borrego MD [facility] [phone]            See the Encounter Report to view Anticoagulation Flowsheet and Dosing Calendar (Go to Encounters tab in chart review, and find the Anticoagulation Therapy Visit)    Dosage adjustment made based on physician directed care plan.    Diego Almanzar RN

## 2017-12-11 DIAGNOSIS — I48.20 CHRONIC ATRIAL FIBRILLATION (H): ICD-10-CM

## 2017-12-11 RX ORDER — WARFARIN SODIUM 5 MG/1
TABLET ORAL
Qty: 105 TABLET | Refills: 0 | Status: SHIPPED | OUTPATIENT
Start: 2017-12-11 | End: 2018-03-16

## 2017-12-14 ENCOUNTER — ANTICOAGULATION THERAPY VISIT (OUTPATIENT)
Dept: ANTICOAGULATION | Facility: OTHER | Age: 82
End: 2017-12-14
Payer: COMMERCIAL

## 2017-12-14 DIAGNOSIS — Z79.01 LONG-TERM (CURRENT) USE OF ANTICOAGULANTS: ICD-10-CM

## 2017-12-14 LAB — INR POINT OF CARE: 2.4 (ref 0.86–1.14)

## 2017-12-14 PROCEDURE — 36416 COLLJ CAPILLARY BLOOD SPEC: CPT

## 2017-12-14 PROCEDURE — 99207 ZZC NO CHARGE NURSE ONLY: CPT

## 2017-12-14 PROCEDURE — 85610 PROTHROMBIN TIME: CPT | Mod: QW

## 2017-12-14 NOTE — MR AVS SNAPSHOT
Kevin Partida   12/14/2017 9:45 AM   Anticoagulation Therapy Visit    Description:  82 year old male   Provider:  ARNIE ANTI COAG   Department:   Anticoag           INR as of 12/14/2017     Today's INR 2.4      Anticoagulation Summary as of 12/14/2017     INR goal 2.0-3.0   Today's INR 2.4   Full instructions 7.5 mg on Tue, Thu, Sat; 5 mg all other days   Next INR check 1/25/2018    Indications   Long-term (current) use of anticoagulants [Z79.01] [Z79.01]         Your next Anticoagulation Clinic appointment(s)     Dec 14, 2017  9:45 AM CST   Anticoagulation Visit with  ANTI COAG   [facility]    [address] [facility] [address]   [phone]            Jan 25, 2018  9:30 AM CST   Anticoagulation Visit with  ANTI COAG   [facility]    [address] [facility] [address]   [phone]              Contact Numbers     Clinic Number:         December 2017 Details    Sun Mon Tue Wed Thu Fri Sat          1               2                 3               4               5               6               7               8               9                 10               11               12               13               14      7.5 mg   See details      15      5 mg         16      7.5 mg           17      5 mg         18      5 mg         19      7.5 mg         20      5 mg         21      7.5 mg         22      5 mg         23      7.5 mg           24      5 mg         25      5 mg         26      7.5 mg         27      5 mg         28      7.5 mg         29      5 mg         30      7.5 mg           31      5 mg                Date Details   12/14 This INR check               How to take your warfarin dose     To take:  5 mg Take 1 of the 5 mg tablets.    To take:  7.5 mg Take 1.5 of the 5 mg tablets.           January 2018 Details    Sun Mon Tue Wed Thu Fri Sat      1      5 mg         2      7.5 mg         3      5 mg         4       7.5 mg         5      5 mg         6      7.5 mg           7      5 mg         8      5 mg         9      7.5 mg         10      5 mg         11      7.5 mg         12      5 mg         13      7.5 mg           14      5 mg         15      5 mg         16      7.5 mg         17      5 mg         18      7.5 mg         19      5 mg         20      7.5 mg           21      5 mg         22      5 mg         23      7.5 mg         24      5 mg         25            26               27                 28               29               30               31                   Date Details   No additional details    Date of next INR:  1/25/2018         How to take your warfarin dose     To take:  5 mg Take 1 of the 5 mg tablets.    To take:  7.5 mg Take 1.5 of the 5 mg tablets.

## 2017-12-14 NOTE — PROGRESS NOTES
ANTICOAGULATION FOLLOW-UP CLINIC VISIT    Patient Name:  Kevin Partida  Date:  12/14/2017  Contact Type:  Face to Face    SUBJECTIVE:     Patient Findings     Positives No Problem Findings           OBJECTIVE    INR Protime   Date Value Ref Range Status   12/14/2017 2.4 (A) 0.86 - 1.14 Final       ASSESSMENT / PLAN  INR assessment THER    Recheck INR In: 6 WEEKS    INR Location Clinic      Anticoagulation Summary as of 12/14/2017     INR goal 2.0-3.0   Today's INR 2.4   Maintenance plan 7.5 mg (5 mg x 1.5) on Tue, Thu, Sat; 5 mg (5 mg x 1) all other days   Full instructions 7.5 mg on Tue, Thu, Sat; 5 mg all other days   Weekly total 42.5 mg   No change documented Diego Almanzar RN   Plan last modified Diego Almanzar RN (3/14/2016)   Next INR check 1/25/2018   Target end date     Indications   Long-term (current) use of anticoagulants [Z79.01] [Z79.01]         Anticoagulation Episode Summary     INR check location     Preferred lab     Send INR reminders to [facility]    Comments 5 mg tabs, PM dose, print out, BP      Anticoagulation Care Providers     Provider Role Specialty Phone number    Taqueria Borrego MD [facility] [phone]            See the Encounter Report to view Anticoagulation Flowsheet and Dosing Calendar (Go to Encounters tab in chart review, and find the Anticoagulation Therapy Visit)    Dosage adjustment made based on physician directed care plan.    Diego Almanzar RN

## 2018-01-25 ENCOUNTER — ANTICOAGULATION THERAPY VISIT (OUTPATIENT)
Dept: ANTICOAGULATION | Facility: OTHER | Age: 83
End: 2018-01-25
Payer: COMMERCIAL

## 2018-01-25 DIAGNOSIS — Z79.01 LONG-TERM (CURRENT) USE OF ANTICOAGULANTS: ICD-10-CM

## 2018-01-25 LAB — INR POINT OF CARE: 4.2 (ref 0.86–1.14)

## 2018-01-25 PROCEDURE — 85610 PROTHROMBIN TIME: CPT | Mod: QW

## 2018-01-25 PROCEDURE — 99207 ZZC NO CHARGE NURSE ONLY: CPT

## 2018-01-25 PROCEDURE — 36416 COLLJ CAPILLARY BLOOD SPEC: CPT

## 2018-01-25 NOTE — MR AVS SNAPSHOT
Kevin Partida   1/25/2018 9:30 AM   Anticoagulation Therapy Visit    Description:  82 year old male   Provider:  ARNIE ANTI COAG   Department:  [facility]           INR as of 1/25/2018     Today's INR 4.2!      Anticoagulation Summary as of 1/25/2018     INR goal 2.0-3.0   Today's INR 4.2!   Full instructions 1/25: Hold; Otherwise 7.5 mg on Tue, Thu, Sat; 5 mg all other days   Next INR check 3/8/2018    Indications   Long-term (current) use of anticoagulants [Z79.01] [Z79.01]         Your next Anticoagulation Clinic appointment(s)     Mar 08, 2018  9:30 AM CST   Anticoagulation Visit with ARNIE ANTI VINCE   [facility] ([facility])    [address] [facility] [id number] [phone]              Contact Numbers     Clinic Number:         January 2018 Details    Sun Mon Tue Wed Thu Fri Sat      1               2               3               4               5               6                 7               8               9               10               11               12               13                 14               15               16               17               18               19               20                 21               22               23               24               25      Hold   See details      26      5 mg         27      7.5 mg           28      5 mg         29      5 mg         30      7.5 mg         31      5 mg             Date Details   01/25 This INR check               How to take your warfarin dose     To take:  5 mg Take 1 of the 5 mg tablets.    To take:  7.5 mg Take 1.5 of the 5 mg tablets.    Hold Do not take your warfarin dose. See the Details table to the right for additional instructions.                February 2018 Details    Sun Mon Tue Wed Thu Fri Sat         1      7.5 mg         2      5 mg         3      7.5 mg           4      5 mg         5      5 mg         6      7.5 mg         7      5 mg         8      7.5 mg         9      5 mg          10      7.5 mg           11      5 mg         12      5 mg         13      7.5 mg         14      5 mg         15      7.5 mg         16      5 mg         17      7.5 mg           18      5 mg         19      5 mg         20      7.5 mg         21      5 mg         22      7.5 mg         23      5 mg         24      7.5 mg           25      5 mg         26      5 mg         27      7.5 mg         28      5 mg             Date Details   No additional details            How to take your warfarin dose     To take:  5 mg Take 1 of the 5 mg tablets.    To take:  7.5 mg Take 1.5 of the 5 mg tablets.           March 2018 Details    Sun Mon Tue Wed Thu Fri Sat         1      7.5 mg         2      5 mg         3      7.5 mg           4      5 mg         5      5 mg         6      7.5 mg         7      5 mg         8            9               10                 11               12               13               14               15               16               17                 18               19               20               21               22               23               24                 25               26               27               28               29               30               31                Date Details   No additional details    Date of next INR:  3/8/2018         How to take your warfarin dose     To take:  5 mg Take 1 of the 5 mg tablets.    To take:  7.5 mg Take 1.5 of the 5 mg tablets.

## 2018-01-25 NOTE — PROGRESS NOTES
ANTICOAGULATION FOLLOW-UP CLINIC VISIT    Patient Name:  Kevin Partida  Date:  1/25/2018  Contact Type:  Face to Face    SUBJECTIVE:     Patient Findings     Positives Unexplained INR or factor level change           OBJECTIVE    INR Protime   Date Value Ref Range Status   01/25/2018 4.2 (A) 0.86 - 1.14 Final       ASSESSMENT / PLAN  INR assessment SUPRA    Recheck INR In: 6 WEEKS    INR Location Clinic      Anticoagulation Summary as of 1/25/2018     INR goal 2.0-3.0   Today's INR 4.2!   Maintenance plan 7.5 mg (5 mg x 1.5) on Tue, Thu, Sat; 5 mg (5 mg x 1) all other days   Full instructions 1/25: Hold; Otherwise 7.5 mg on Tue, Thu, Sat; 5 mg all other days   Weekly total 42.5 mg   Plan last modified Diego Almanzar RN (3/14/2016)   Next INR check 3/8/2018   Target end date     Indications   Long-term (current) use of anticoagulants [Z79.01] [Z79.01]         Anticoagulation Episode Summary     INR check location     Preferred lab     Send INR reminders to [facility] POOL    Comments 5 mg tabs, PM dose, print out, BP      Anticoagulation Care Providers     Provider Role Specialty Phone number    Taqueria Borrego MD [facility] [phone]            See the Encounter Report to view Anticoagulation Flowsheet and Dosing Calendar (Go to Encounters tab in chart review, and find the Anticoagulation Therapy Visit)    Dosage adjustment made based on physician directed care plan.    Diego Almanzar, RN

## 2018-01-29 DIAGNOSIS — I25.10 CORONARY ARTERY DISEASE INVOLVING NATIVE CORONARY ARTERY WITHOUT ANGINA PECTORIS: ICD-10-CM

## 2018-01-29 RX ORDER — LISINOPRIL 10 MG/1
10 TABLET ORAL DAILY
Qty: 90 TABLET | Refills: 3 | Status: SHIPPED | OUTPATIENT
Start: 2018-01-29 | End: 2019-01-28

## 2018-01-29 RX ORDER — CARVEDILOL 12.5 MG/1
12.5 TABLET ORAL 2 TIMES DAILY WITH MEALS
Qty: 180 TABLET | Refills: 3 | Status: SHIPPED | OUTPATIENT
Start: 2018-01-29 | End: 2019-02-15

## 2018-03-08 ENCOUNTER — ANTICOAGULATION THERAPY VISIT (OUTPATIENT)
Dept: ANTICOAGULATION | Facility: OTHER | Age: 83
End: 2018-03-08
Payer: COMMERCIAL

## 2018-03-08 DIAGNOSIS — Z79.01 LONG-TERM (CURRENT) USE OF ANTICOAGULANTS: ICD-10-CM

## 2018-03-08 LAB — INR POINT OF CARE: 3.2 (ref 0.86–1.14)

## 2018-03-08 PROCEDURE — 99207 ZZC NO CHARGE NURSE ONLY: CPT

## 2018-03-08 PROCEDURE — 85610 PROTHROMBIN TIME: CPT | Mod: QW

## 2018-03-08 PROCEDURE — 36416 COLLJ CAPILLARY BLOOD SPEC: CPT

## 2018-03-08 NOTE — PROGRESS NOTES
ANTICOAGULATION FOLLOW-UP CLINIC VISIT    Patient Name:  Kevin Partida  Date:  3/8/2018  Contact Type:  Face to Face    SUBJECTIVE:     Patient Findings     Positives No Problem Findings           OBJECTIVE    INR Protime   Date Value Ref Range Status   03/08/2018 3.2 (A) 0.86 - 1.14 Final       ASSESSMENT / PLAN  INR assessment THER    Recheck INR In: 6 WEEKS    INR Location Clinic      Anticoagulation Summary as of 3/8/2018     INR goal 2.0-3.0   Today's INR 3.2!   Maintenance plan 7.5 mg (5 mg x 1.5) on Tue, Thu, Sat; 5 mg (5 mg x 1) all other days   Full instructions 7.5 mg on Tue, Thu, Sat; 5 mg all other days   Weekly total 42.5 mg   No change documented Diego Almanzar RN   Plan last modified Diego Almanzar RN (3/14/2016)   Next INR check 4/19/2018   Target end date     Indications   Long-term (current) use of anticoagulants [Z79.01] [Z79.01]         Anticoagulation Episode Summary     INR check location     Preferred lab     Send INR reminders to [facility] POOL    Comments 5 mg tabs, PM dose, print out, BP      Anticoagulation Care Providers     Provider Role Specialty Phone number    Taqueria Borrego MD [facility] [phone]            See the Encounter Report to view Anticoagulation Flowsheet and Dosing Calendar (Go to Encounters tab in chart review, and find the Anticoagulation Therapy Visit)    Dosage adjustment made based on physician directed care plan.    Diego Almanzar, RN

## 2018-03-08 NOTE — MR AVS SNAPSHOT
Kevin Partida   3/8/2018 9:30 AM   Anticoagulation Therapy Visit    Description:  82 year old male   Provider:  ARNIE ANTI COAG   Department:  [facility]           INR as of 3/8/2018     Today's INR 3.2!      Anticoagulation Summary as of 3/8/2018     INR goal 2.0-3.0   Today's INR 3.2!   Full instructions 7.5 mg on Tue, Thu, Sat; 5 mg all other days   Next INR check 4/19/2018    Indications   Long-term (current) use of anticoagulants [Z79.01] [Z79.01]         Your next Anticoagulation Clinic appointment(s)     Apr 19, 2018  9:30 AM CDT   Anticoagulation Visit with ARNIE ANTI VINCE   [facility] ([facility])    [address] [facility] [address] [phone]              Contact Numbers     Clinic Number:         March 2018 Details    Sun Mon Tue Wed Thu Fri Sat         1               2               3                 4               5               6               7               8      7.5 mg   See details      9      5 mg         10      7.5 mg           11      5 mg         12      5 mg         13      7.5 mg         14      5 mg         15      7.5 mg         16      5 mg         17      7.5 mg           18      5 mg         19      5 mg         20      7.5 mg         21      5 mg         22      7.5 mg         23      5 mg         24      7.5 mg           25      5 mg         26      5 mg         27      7.5 mg         28      5 mg         29      7.5 mg         30      5 mg         31      7.5 mg          Date Details   03/08 This INR check               How to take your warfarin dose     To take:  5 mg Take 1 of the 5 mg tablets.    To take:  7.5 mg Take 1.5 of the 5 mg tablets.           April 2018 Details    Sun Mon Tue Wed Thu Fri Sat     1      5 mg         2      5 mg         3      7.5 mg         4      5 mg         5      7.5 mg         6      5 mg         7      7.5 mg           8      5 mg         9      5 mg         10      7.5 mg         11      5 mg         12       7.5 mg         13      5 mg         14      7.5 mg           15      5 mg         16      5 mg         17      7.5 mg         18      5 mg         19            20               21                 22               23               24               25               26               27               28                 29               30                     Date Details   No additional details    Date of next INR:  4/19/2018         How to take your warfarin dose     To take:  5 mg Take 1 of the 5 mg tablets.    To take:  7.5 mg Take 1.5 of the 5 mg tablets.

## 2018-03-16 DIAGNOSIS — I48.20 CHRONIC ATRIAL FIBRILLATION (H): ICD-10-CM

## 2018-03-16 NOTE — TELEPHONE ENCOUNTER
"Requested Prescriptions   Pending Prescriptions Disp Refills     warfarin (JANTOVEN) 5 MG tablet 105 tablet 0     Sig: Take 7.5 mg on Tuesday, Thursday, Saturday and 5 mg the rest of the week or as directed by coumadin clinic    Vitamin K Antagonists Failed    3/16/2018  3:10 PM       Failed - INR is within goal in the past 6 weeks    Confirm INR is within goal in the past 6 weeks.     Recent Labs   Lab Test 03/08/18   INR  3.2*                      Passed - Recent (12 mo) or future (30 days) visit within the authorizing provider's specialty    Patient had office visit in the last 12 months or has a visit in the next 30 days with authorizing provider or within the authorizing provider's specialty.  See \"Patient Info\" tab in inbasket, or \"Choose Columns\" in Meds & Orders section of the refill encounter.           Passed - Patient is 18 years of age or older          "

## 2018-03-19 ENCOUNTER — HOSPITAL ENCOUNTER (OUTPATIENT)
Dept: CARDIOLOGY | Facility: CLINIC | Age: 83
Discharge: HOME | End: 2018-03-19
Attending: INTERNAL MEDICINE | Admitting: INTERNAL MEDICINE
Payer: MEDICARE

## 2018-03-19 DIAGNOSIS — I25.5 ISCHEMIC CARDIOMYOPATHY: ICD-10-CM

## 2018-03-19 LAB
ANION GAP SERPL CALCULATED.3IONS-SCNC: 5 MMOL/L (ref 3–14)
BUN SERPL-MCNC: 22 MG/DL (ref 7–30)
CALCIUM SERPL-MCNC: 8.8 MG/DL (ref 8.5–10.1)
CHLORIDE SERPL-SCNC: 105 MMOL/L (ref 94–109)
CO2 SERPL-SCNC: 30 MMOL/L (ref 20–32)
CREAT SERPL-MCNC: 1 MG/DL (ref 0.66–1.25)
GFR SERPL CREATININE-BSD FRML MDRD: 71 ML/MIN/1.7M2
GLUCOSE SERPL-MCNC: 123 MG/DL (ref 70–99)
POTASSIUM SERPL-SCNC: 4.6 MMOL/L (ref 3.4–5.3)
SODIUM SERPL-SCNC: 140 MMOL/L (ref 133–144)

## 2018-03-19 PROCEDURE — 80048 BASIC METABOLIC PNL TOTAL CA: CPT | Performed by: INTERNAL MEDICINE

## 2018-03-19 PROCEDURE — 93306 TTE W/DOPPLER COMPLETE: CPT | Mod: 26 | Performed by: INTERNAL MEDICINE

## 2018-03-19 PROCEDURE — 25500064 ZZH RX 255 OP 636: Performed by: INTERNAL MEDICINE

## 2018-03-19 PROCEDURE — 93306 TTE W/DOPPLER COMPLETE: CPT

## 2018-03-19 PROCEDURE — 36415 COLL VENOUS BLD VENIPUNCTURE: CPT | Performed by: INTERNAL MEDICINE

## 2018-03-19 RX ORDER — WARFARIN SODIUM 5 MG/1
TABLET ORAL
Qty: 105 TABLET | Refills: 0 | Status: SHIPPED | OUTPATIENT
Start: 2018-03-19 | End: 2018-06-18

## 2018-03-19 RX ADMIN — HUMAN ALBUMIN MICROSPHERES AND PERFLUTREN 3 ML: 10; .22 INJECTION, SOLUTION INTRAVENOUS at 10:25

## 2018-03-20 ENCOUNTER — OFFICE VISIT (OUTPATIENT)
Dept: CARDIOLOGY | Facility: CLINIC | Age: 83
End: 2018-03-20
Attending: INTERNAL MEDICINE
Payer: COMMERCIAL

## 2018-03-20 VITALS
OXYGEN SATURATION: 98 % | BODY MASS INDEX: 31.46 KG/M2 | HEIGHT: 73 IN | DIASTOLIC BLOOD PRESSURE: 70 MMHG | WEIGHT: 237.4 LBS | HEART RATE: 64 BPM | SYSTOLIC BLOOD PRESSURE: 124 MMHG

## 2018-03-20 DIAGNOSIS — R06.02 SHORTNESS OF BREATH: ICD-10-CM

## 2018-03-20 DIAGNOSIS — E78.5 HYPERLIPIDEMIA LDL GOAL <70: ICD-10-CM

## 2018-03-20 DIAGNOSIS — I25.10 CORONARY ARTERY DISEASE INVOLVING NATIVE CORONARY ARTERY OF NATIVE HEART WITHOUT ANGINA PECTORIS: ICD-10-CM

## 2018-03-20 DIAGNOSIS — I48.20 CHRONIC ATRIAL FIBRILLATION (H): ICD-10-CM

## 2018-03-20 DIAGNOSIS — I25.5 ISCHEMIC CARDIOMYOPATHY: Primary | ICD-10-CM

## 2018-03-20 PROCEDURE — 99214 OFFICE O/P EST MOD 30 MIN: CPT | Performed by: INTERNAL MEDICINE

## 2018-03-20 NOTE — LETTER
3/20/2018      Chad Gee, DO  [address] [place] [address]      RE: Kevin Partida       Dear Colleague,    I had the pleasure of seeing Kevin Partida in the [facility].    Service Date: 03/20/2018      HISTORY OF PRESENT ILLNESS:  I had the opportunity to see Mr. Kevin Partida in Cardiology Clinic today at the [facility] in [place] for reevaluation of coronary artery disease and ischemic cardiomyopathy.  He also has chronic atrial fibrillation which we have been treating with a strategy of rate control and anticoagulation.  He had undergone cardioversions in the distant past but was unable to stay in sinus rhythm.  He seems completely asymptomatic with his atrial fibrillation.        When I met him in 2011, he was discovered to have significant left ventricular dysfunction with an ejection fraction of 30%.  He underwent angiography and was found to have severe disease of the circumflex and underwent stenting.  He had some moderate residual disease but no other significant occlusive lesions, but his left ventricular function did not improve after revascularization.  He remains stable with an ejection fraction of 30%-35% for a few years and then last year, his ejection fraction was down to 25%-30% where it remains today.  His echocardiogram was recently done on 03/19/2018 and again shows a stable ejection fraction of 25%-30% with global hypokinesis and no significant valvular disease.  His atrial fibrillation has been consistently well rate controlled.        He has remained on carvedilol, lisinopril and Lasix and he seems to be doing well.  He continues to be active, working outside and chopping wood.  He does not get short of breath but is moving slower than he used to and takes more frequent breaks.  He seems to be more limited by his low back pain than his breathing problems.      PHYSICAL EXAMINATION:  His blood pressure is  124/70, heart rate 64 and weight 237 pounds.  He is up about 25 pounds since last year, which he attributes to quitting smoking.  He has now gone about a year without cigarettes.  His lungs sound quite clear.  His heart rhythm is irregular without cardiac murmur.  He has no carotid bruits or edema.      IMPRESSIONS:  Mr. Kevin Partida is an 83-year-old gentleman with chronic ischemic cardiomyopathy with severe left ventricular dysfunction.  His ejection fraction is 25%-30%.  He has refused prophylactic defibrillator implantation.  He is on reasonable medical therapy at this point with minimal heart failure symptoms.  I advised him to minimize his sodium intake.  He is unaware of his atrial fibrillation and has no angina.  He remains on warfarin for stroke prevention.  I am glad to see that he is doing well and glad to hear that he quit smoking a year ago.  I will plan to have him follow up with me again next year for reevaluation.      cc:      [place] [address]         JAI EPPS MD, [facility]             D: 2018   T: 2018   MT: RODOLFO      Name:     KEVIN PARTIDA   MRN:      [id number]        Account:      [id number]   :      1935           Service Date: 2018      Document: [id number]         Outpatient Encounter Prescriptions as of 3/20/2018   Medication Sig Dispense Refill     warfarin (JANTOVEN) 5 MG tablet Take 7.5 mg on Tuesday, Thursday, Saturday and 5 mg the rest of the week or as directed by coumadin clinic 105 tablet 0     carvedilol (COREG) 12.5 MG tablet Take 1 tablet (12.5 mg) by mouth 2 times daily (with meals) 180 tablet 3     lisinopril (PRINIVIL/ZESTRIL) 10 MG tablet Take 1 tablet (10 mg) by mouth daily 90 tablet 3     simvastatin (ZOCOR) 80 MG tablet Take 1 tablet (80 mg) by mouth daily 90 tablet 2     furosemide (LASIX) 20 MG tablet Take 1 tablet (20 mg) by mouth daily 90 tablet 3     Omega-3  Fatty Acids (OMEGA-3 FISH OIL PO) Take 1 g by mouth daily       aspirin 81 MG tablet Take 1 tablet by mouth daily.       No facility-administered encounter medications on file as of 3/20/2018.        Again, thank you for allowing me to participate in the care of your patient.      Sincerely,    JAI EPPS MD     [facility]

## 2018-03-20 NOTE — PROGRESS NOTES
HPI and Plan:   See dictation    Orders Placed This Encounter   Procedures     Basic metabolic panel     Lipid Profile     ALT     Follow-Up with Cardiologist     Echocardiogram       No orders of the defined types were placed in this encounter.      There are no discontinued medications.      Encounter Diagnoses   Name Primary?     Ischemic cardiomyopathy Yes     Coronary artery disease involving native coronary artery of native heart without angina pectoris      Hyperlipidemia LDL goal <70      Chronic atrial fibrillation (H)      Shortness of breath        CURRENT MEDICATIONS:  Current Outpatient Prescriptions   Medication Sig Dispense Refill     warfarin (JANTOVEN) 5 MG tablet Take 7.5 mg on Tuesday, Thursday, Saturday and 5 mg the rest of the week or as directed by coumadin clinic 105 tablet 0     carvedilol (COREG) 12.5 MG tablet Take 1 tablet (12.5 mg) by mouth 2 times daily (with meals) 180 tablet 3     lisinopril (PRINIVIL/ZESTRIL) 10 MG tablet Take 1 tablet (10 mg) by mouth daily 90 tablet 3     simvastatin (ZOCOR) 80 MG tablet Take 1 tablet (80 mg) by mouth daily 90 tablet 2     furosemide (LASIX) 20 MG tablet Take 1 tablet (20 mg) by mouth daily 90 tablet 3     Omega-3 Fatty Acids (OMEGA-3 FISH OIL PO) Take 1 g by mouth daily       aspirin 81 MG tablet Take 1 tablet by mouth daily.         ALLERGIES     Allergies   Allergen Reactions     Codeine GI Disturbance     Niacin Hives     got very red and flushed.  Doesn't want       PAST MEDICAL HISTORY:  Past Medical History:   Diagnosis Date     Atrial fibrillation (H) 04/14/07    Admit. Discharged 04/15/07     Atrial flutter (H)      Coronary artery disease      Headache(784.0)      Hepatitis, unspecified      Hyperlipidaemia      Hypertension      Measles without mention of complication     Measles     Mumps without mention of complication     Mumps     Orchitis and epididymitis, unspecified      Other and unspecified hyperlipidemia      Other emphysema (H)       Other speech disturbance     Stuttering     Pneumonia, organism     Pneumonia     Shortness of breath      Urinary obstruction      Varicella without mention of complication     Chickenpox       PAST SURGICAL HISTORY:  Past Surgical History:   Procedure Laterality Date     COLONOSCOPY  2011    Procedure:COLONOSCOPY; colonoscopy; Surgeon:IVETH WIGGINS; Location:[facility]     HC COLONOSCOPY W/WO BRUSH/WASH  06     HC LAPAROSCOPY, SURGICAL; CHOLECYSTECTOMY      Cholecystectomy, Laparoscopic     HC REMOVAL OF TONSILS,<13 Y/O      Tonsils <12y.o.     LAMINECT/DISCECTOMY, LUMBAR  08    Right L4-L5 microlumbar diskectomy.       FAMILY HISTORY:  Family History   Problem Relation Age of Onset     Alzheimer Disease Mother      ?dimentia or alzheimers     DIABETES Mother      insulin     EYE* Mother      cataract     CEREBROVASCULAR DISEASE Mother      CANCER Father      lymph tumor of glands     DIABETES Maternal Aunt      ? oral or insulin     DIABETES Other      4 cousins insulin dependent     Genetic Disorder Maternal Grandmother      tremors     HEART DISEASE Paternal Uncle      ? at age 60-70     Neurologic Disorder Paternal Uncle      parkinsons     CEREBROVASCULAR DISEASE Paternal Uncle      ? dued at age 60-70       SOCIAL HISTORY:  Social History     Social History     Marital status:      Spouse name: Emogene     Number of children: 2     Years of education: 12+     Occupational History     retired telephone tech [employer]     Social History Main Topics     Smoking status: Former Smoker     Packs/day: 0.50     Types: Cigarettes     Smokeless tobacco: Never Used      Comment: smoked for 57 years- since age 9     Alcohol use 0.0 oz/week     0 Standard drinks or equivalent per week      Comment: socially     Drug use: No     Sexual activity: No     Other Topics Concern      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     Hobby Hazards Yes     [employer],hunting,works on trucks and cars      Sleep Concern No     Stress Concern No     Weight Concern No     Special Diet No     Back Care No     Exercise Yes     outside work, cuts wood, yard work, shovels snow off roof     Bike Helmet No     Seat Belt Yes     Self-Exams Yes     Parent/Sibling W/ Cabg, Mi Or Angioplasty Before 65f 55m? Yes     Social History Narrative       Review of Systems:  Skin:  Positive for bruising     Eyes:  Positive for glasses    ENT:  Negative      Respiratory:  Positive for dyspnea on exertion emphesima    Cardiovascular:  Negative for;palpitations;chest pain;edema Positive for;lightheadedness;dizziness    Gastroenterology: Negative      Genitourinary:  Negative      Musculoskeletal:  Negative      Neurologic:  Negative      Psychiatric:  Negative      Heme/Lymph/Imm:  Positive for allergies    Endocrine:  Negative        Physical Exam:  Vitals: /70 (BP Location: Right arm, Patient Position: Fowlers, Cuff Size: Adult Large)  Pulse 64  Ht 1.854 m (6' 1\")  Wt 107.7 kg (237 lb 6.4 oz)  SpO2 98%  BMI 31.32 kg/m2    Constitutional:  cooperative, alert and oriented, well developed, well nourished, in no acute distress        Skin:  warm and dry to the touch      (cyanotic nose)    Head:  normocephalic        Eyes:  pupils equal and round;conjunctivae and lids unremarkable (wears glasses)        Lymph:      ENT:  no pallor or cyanosis, dentition good        Neck:  carotid pulses are full and equal bilaterally;no carotid bruit JVP 8-10      Respiratory:  clear to auscultation prolonged expiration;expiratory wheezes;diminished breath sounds bilaterally       Cardiac:   irregularly irregular rhythm   no presence of murmur          pulses full and equal                                        GI:  non-tender;abdomen soft;BS normoactive        Extremities and " Muscular Skeletal:  no deformities, clubbing, cyanosis, erythema observed;no edema   bilateral LE edema;1+          Neurological:  no gross motor deficits;affect appropriate        Psych:  Alert and Oriented x 3        CC  Jaquan Platt MD  [address]  [place] [address]

## 2018-03-20 NOTE — PROGRESS NOTES
Service Date: 03/20/2018      HISTORY OF PRESENT ILLNESS:  I had the opportunity to see Mr. Kevin Partida in Cardiology Clinic today at the [facility] in [place] for reevaluation of coronary artery disease and ischemic cardiomyopathy.  He also has chronic atrial fibrillation which we have been treating with a strategy of rate control and anticoagulation.  He had undergone cardioversions in the distant past but was unable to stay in sinus rhythm.  He seems completely asymptomatic with his atrial fibrillation.        When I met him in 2011, he was discovered to have significant left ventricular dysfunction with an ejection fraction of 30%.  He underwent angiography and was found to have severe disease of the circumflex and underwent stenting.  He had some moderate residual disease but no other significant occlusive lesions, but his left ventricular function did not improve after revascularization.  He remains stable with an ejection fraction of 30%-35% for a few years and then last year, his ejection fraction was down to 25%-30% where it remains today.  His echocardiogram was recently done on 03/19/2018 and again shows a stable ejection fraction of 25%-30% with global hypokinesis and no significant valvular disease.  His atrial fibrillation has been consistently well rate controlled.        He has remained on carvedilol, lisinopril and Lasix and he seems to be doing well.  He continues to be active, working outside and chopping wood.  He does not get short of breath but is moving slower than he used to and takes more frequent breaks.  He seems to be more limited by his low back pain than his breathing problems.      PHYSICAL EXAMINATION:  His blood pressure is 124/70, heart rate 64 and weight 237 pounds.  He is up about 25 pounds since last year, which he attributes to quitting smoking.  He has now gone about a year without cigarettes.  His lungs sound quite clear.  His heart rhythm is  irregular without cardiac murmur.  He has no carotid bruits or edema.      IMPRESSIONS:  Mr. Kevin Partida is an 83-year-old gentleman with chronic ischemic cardiomyopathy with severe left ventricular dysfunction.  His ejection fraction is 25%-30%.  He has refused prophylactic defibrillator implantation.  He is on reasonable medical therapy at this point with minimal heart failure symptoms.  I advised him to minimize his sodium intake.  He is unaware of his atrial fibrillation and has no angina.  He remains on warfarin for stroke prevention.  I am glad to see that he is doing well and glad to hear that he quit smoking a year ago.  I will plan to have him follow up with me again next year for reevaluation.      cc:      [place] [address]         JAI EPPS MD, [facility]             D: 2018   T: 2018   MT: RODOLFO      Name:     KEVIN PARTIDA   MRN:      [id number]        Account:      [id number]   :      1935           Service Date: 2018      Document: [id number]

## 2018-03-20 NOTE — LETTER
3/20/2018    Chad Gee, DO  [address] [place] [address]    RE: Kevin Partida       Dear Colleague,    I had the pleasure of seeing Kevin Partida in the [facility].    HPI and Plan:   See dictation    Orders Placed This Encounter   Procedures     Basic metabolic panel     Lipid Profile     ALT     Follow-Up with Cardiologist     Echocardiogram       No orders of the defined types were placed in this encounter.      There are no discontinued medications.      Encounter Diagnoses   Name Primary?     Ischemic cardiomyopathy Yes     Coronary artery disease involving native coronary artery of native heart without angina pectoris      Hyperlipidemia LDL goal <70      Chronic atrial fibrillation (H)      Shortness of breath        CURRENT MEDICATIONS:  Current Outpatient Prescriptions   Medication Sig Dispense Refill     warfarin (JANTOVEN) 5 MG tablet Take 7.5 mg on Tuesday, Thursday, Saturday and 5 mg the rest of the week or as directed by coumadin clinic 105 tablet 0     carvedilol (COREG) 12.5 MG tablet Take 1 tablet (12.5 mg) by mouth 2 times daily (with meals) 180 tablet 3     lisinopril (PRINIVIL/ZESTRIL) 10 MG tablet Take 1 tablet (10 mg) by mouth daily 90 tablet 3     simvastatin (ZOCOR) 80 MG tablet Take 1 tablet (80 mg) by mouth daily 90 tablet 2     furosemide (LASIX) 20 MG tablet Take 1 tablet (20 mg) by mouth daily 90 tablet 3     Omega-3 Fatty Acids (OMEGA-3 FISH OIL PO) Take 1 g by mouth daily       aspirin 81 MG tablet Take 1 tablet by mouth daily.         ALLERGIES     Allergies   Allergen Reactions     Codeine GI Disturbance     Niacin Hives     got very red and flushed.  Doesn't want       PAST MEDICAL HISTORY:  Past Medical History:   Diagnosis Date     Atrial fibrillation (H) 04/14/07    Admit. Discharged 04/15/07     Atrial flutter (H)      Coronary artery disease      Headache(784.0)      Hepatitis, unspecified      Hyperlipidaemia       Hypertension      Measles without mention of complication     Measles     Mumps without mention of complication     Mumps     Orchitis and epididymitis, unspecified      Other and unspecified hyperlipidemia      Other emphysema (H)      Other speech disturbance     Stuttering     Pneumonia, organism     Pneumonia     Shortness of breath      Urinary obstruction      Varicella without mention of complication     Chickenpox       PAST SURGICAL HISTORY:  Past Surgical History:   Procedure Laterality Date     COLONOSCOPY  2011    Procedure:COLONOSCOPY; colonoscopy; Surgeon:IVETH WIGGINS; Location:[facility]     HC COLONOSCOPY W/WO BRUSH/WASH  06     HC LAPAROSCOPY, SURGICAL; CHOLECYSTECTOMY      Cholecystectomy, Laparoscopic     HC REMOVAL OF TONSILS,<13 Y/O      Tonsils <12y.o.     LAMINECT/DISCECTOMY, LUMBAR  08    Right L4-L5 microlumbar diskectomy.       FAMILY HISTORY:  Family History   Problem Relation Age of Onset     Alzheimer Disease Mother      ?dimentia or alzheimers     DIABETES Mother      insulin     EYE* Mother      cataract     CEREBROVASCULAR DISEASE Mother      CANCER Father      lymph tumor of glands     DIABETES Maternal Aunt      ? oral or insulin     DIABETES Other      4 cousins insulin dependent     Genetic Disorder Maternal Grandmother      tremors     HEART DISEASE Paternal Uncle      ? at age 60-70     Neurologic Disorder Paternal Uncle      parkinsons     CEREBROVASCULAR DISEASE Paternal Uncle      ? 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     Hobby Hazards Yes     [employer],hunting,works on trucks and cars      Sleep Concern No     Stress Concern No     Weight Concern No     Special Diet No     Back Care No     Exercise Yes     outside work, cuts wood, yard work, shovels snow off roof     Bike Helmet No     Seat Belt Yes     Self-Exams Yes     Parent/Sibling W/ Cabg, Mi Or Angioplasty Before 65f 55m? Yes     Social History Narrative       Review of Systems:  Skin:  Positive for bruising     Eyes:  Positive for glasses    ENT:  Negative      Respiratory:  Positive for dyspnea on exertion emphesima    Cardiovascular:  Negative for;palpitations;chest pain;edema Positive for;lightheadedness;dizziness    Gastroenterology: Negative      Genitourinary:  Negative      Musculoskeletal:  Negative      Neurologic:  Negative      Psychiatric:  Negative      Heme/Lymph/Imm:  Positive for allergies    Endocrine:  Negative        Physical Exam:  Vitals: /70 (BP Location: Right arm, Patient Position: Fowlers, Cuff Size: Adult Large)  Pulse 64  Ht 1.854 m (6' 1\")  Wt 107.7 kg (237 lb 6.4 oz)  SpO2 98%  BMI 31.32 kg/m2    Constitutional:  cooperative, alert and oriented, well developed, well nourished, in no acute distress        Skin:  warm and dry to the touch      (cyanotic nose)    Head:  normocephalic        Eyes:  pupils equal and round;conjunctivae and lids unremarkable (wears glasses)        Lymph:      ENT:  no pallor or cyanosis, dentition good        Neck:  carotid pulses are full and equal bilaterally;no carotid bruit JVP 8-10      Respiratory:  clear to auscultation prolonged expiration;expiratory wheezes;diminished " breath sounds bilaterally       Cardiac:   irregularly irregular rhythm   no presence of murmur          pulses full and equal                                        GI:  non-tender;abdomen soft;BS normoactive        Extremities and Muscular Skeletal:  no deformities, clubbing, cyanosis, erythema observed;no edema   bilateral LE edema;1+          Neurological:  no gross motor deficits;affect appropriate        Psych:  Alert and Oriented x 3        CC  Jaquan Platt MD  [address]  [place] [address]                Thank you for allowing me to participate in the care of your patient.      Sincerely,     JAQUAN PLATT MD     [facility]    cc:   Jaquan Platt MD  [address]  [place] [address]

## 2018-03-20 NOTE — MR AVS SNAPSHOT
After Visit Summary   3/20/2018    Kevin Partida    MRN: [id number]           Patient Information     Date Of Birth          1935        Visit Information        Provider Department      3/20/2018 2:30 PM Jaquan Platt MD [facility]        Today's Diagnoses     Ischemic cardiomyopathy    -  1    Coronary artery disease involving native coronary artery of native heart without angina pectoris        Hyperlipidemia LDL goal <70        Chronic atrial fibrillation (H)        Shortness of breath           Follow-ups after your visit        Additional Services     Follow-Up with Cardiologist                 Your next 10 appointments already scheduled     Apr 19, 2018  9:30 AM CDT   [facility]  [facility]   [facility]    [address] [facility] [address] [phone]              Future tests that were ordered for you today     Open Future Orders        Priority Expected Expires Ordered    Basic metabolic panel Routine 3/20/2019 3/21/2019 3/20/2018    Lipid Profile Routine 3/20/2019 3/20/2019 3/20/2018    ALT Routine 3/20/2019 3/20/2019 3/20/2018    Echocardiogram Routine 3/20/2019 3/21/2019 3/20/2018    Follow-Up with Cardiologist Routine 3/20/2019 3/21/2019 3/20/2018    ECHO COMPLETE WITH OPTISON Routine 4/11/2018 5/16/2018 4/11/2017            Who to contact     If you have questions or need follow up information about today's clinic visit or your schedule please contact [facility] directly at [phone].  Normal or non-critical lab and imaging results will be communicated to you by MyChart, letter or phone within 4 business days after the clinic has received the results. If you do not hear from us within 7 days, please contact the clinic through MyChart or phone. If you have a critical or abnormal lab result, we will notify you by phone as soon as  "possible.  Submit refill requests through [employer] or call your pharmacy and they will forward the refill request to us. Please allow 3 business days for your refill to be completed.          Additional Information About Your Visit        [employer] Information     [employer] gives you secure access to your electronic health record. If you see a primary care provider, you can also send messages to your care team and make appointments. If you have questions, please call your primary care clinic.  If you do not have a primary care provider, please call [phone] and they will assist you.        Care EveryWhere ID     This is your Care EveryWhere ID. This could be used by other organizations to access your [place] medical records  [id number]        Your Vitals Were     Pulse Height Pulse Oximetry BMI (Body Mass Index)          64 1.854 m (6' 1\") 98% 31.32 kg/m2         Blood Pressure from Last 3 Encounters:   03/20/18 124/70   06/30/17 96/58   05/12/17 118/80    Weight from Last 3 Encounters:   03/20/18 107.7 kg (237 lb 6.4 oz)   06/30/17 100.2 kg (220 lb 14.4 oz)   05/12/17 98.4 kg (217 lb)              We Performed the Following     Follow-Up with Cardiologist        Primary Care Provider Office Phone # Fax #    Chad Librado Gee [phone] [address] [place] [address]        Equal Access to Services     [facility]: Hadii aad ku hadasho Soomaali, waaxda luqadaha, qaybta kaalmada adeegyada, alia porter hayalicia garcia . So [facility] [phone].    ATENCIÓN: Si habla español, tiene a owen disposición servicios gratuitos de asistencia lingüística. Bryan al [phone].    We comply with applicable federal civil rights laws and [place] laws. We do not discriminate on the basis of race, color, national origin, age, disability, sex, sexual orientation, or gender identity.            Thank you!     Thank you for choosing [facility]" [facility]  for your care. Our goal is always to provide you with excellent care. Hearing back from our patients is one way we can continue to improve our services. Please take a few minutes to complete the written survey that you may receive in the mail after your visit with us. Thank you!             Your Updated Medication List - Protect others around you: Learn how to safely use, store and throw away your medicines at www.disposemymeds.org.          This list is accurate as of 3/20/18  3:01 PM.  Always use your most recent med list.                   Brand Name Dispense Instructions for use Diagnosis    aspirin 81 MG tablet      Take 1 tablet by mouth daily.        carvedilol 12.5 MG tablet    COREG    180 tablet    Take 1 tablet (12.5 mg) by mouth 2 times daily (with meals)    Coronary artery disease involving native coronary artery without angina pectoris       furosemide 20 MG tablet    LASIX    90 tablet    Take 1 tablet (20 mg) by mouth daily    Ischemic cardiomyopathy       lisinopril 10 MG tablet    PRINIVIL/ZESTRIL    90 tablet    Take 1 tablet (10 mg) by mouth daily    Coronary artery disease involving native coronary artery without angina pectoris       OMEGA-3 FISH OIL PO      Take 1 g by mouth daily        simvastatin 80 MG tablet    ZOCOR    90 tablet    Take 1 tablet (80 mg) by mouth daily    Hyperlipidemia LDL goal <70       warfarin 5 MG tablet    JANTOVEN    105 tablet    Take 7.5 mg on Tuesday, Thursday, Saturday and 5 mg the rest of the week or as directed by coumadin clinic    Chronic atrial fibrillation (H)

## 2018-03-28 ENCOUNTER — HOSPITAL ENCOUNTER (EMERGENCY)
Facility: CLINIC | Age: 83
Discharge: LEFT BEFORE DISCHARGE | End: 2018-03-28
Payer: MEDICARE

## 2018-03-28 ENCOUNTER — TELEPHONE (OUTPATIENT)
Dept: FAMILY MEDICINE | Facility: OTHER | Age: 83
End: 2018-03-28

## 2018-03-28 VITALS
BODY MASS INDEX: 31.14 KG/M2 | DIASTOLIC BLOOD PRESSURE: 78 MMHG | SYSTOLIC BLOOD PRESSURE: 106 MMHG | HEART RATE: 93 BPM | OXYGEN SATURATION: 96 % | TEMPERATURE: 99 F | WEIGHT: 235 LBS | RESPIRATION RATE: 18 BRPM | HEIGHT: 73 IN

## 2018-03-28 NOTE — ED NOTES
"Pt left after triage. Pt stated observed yelling as he was walking out. Pt family member stayed behind. I apologized to the family member that stayed behind about the wait today. Explained that we are unfortunately very busy and, we could try to get them back as soon as possible. Family member stated \"I won't get him back here.\" I once again apologized for the inconvenience.    "

## 2018-03-28 NOTE — ED NOTES
He has been having nose bleeds for the past week and says he has just been hanging his head over the deck and letting it go.  He is getting to feel weak now but does not have a nose bleed now.

## 2018-03-28 NOTE — TELEPHONE ENCOUNTER
"Kevin Partida is a 83 year old male-    NURSING ASSESSMENT:  Description:  Patient's wife calls. Consent on file to communicate with her and pt is also present.  Patient has been having frequent nosebleeds for the past week, \"Everyday, multiple times a day\". Patient also reports frequent headaches, and some lightheadedness. Per wife,  will just let his nose drip blood in the sink and when he does try to stop it it takes a long time. Wife has wanted pt to come in and get it looked at, but he has been refusing until now. No current nosebleed. One just finished and took ~ 5-10 minutes to stop. Pt is currently on coumadin and medications for BP. Pt does not check Bp regularly at home. DENIES chest pain or difficulty breathing, trauma to the nose or injury.   Onset/duration:  1 week   Precip. factors:  Unknown   Associated symptoms:  Headaches, pt takes blood thinners  Improves/worsens symptoms:  Continues to occur multiple times a day.   Pain scale (0-10)   0/10  Last exam/Treatment:  6/30/2017  Allergies:   Allergies   Allergen Reactions     Codeine GI Disturbance     Niacin Hives     got very red and flushed.  Doesn't want     NURSING PLAN: Nursing advice to patient see in the emergency room for evaluation of symptoms.     RECOMMENDED DISPOSITION:  See in 4 hours, another person to drive - Pt should be seen in 2 hours. No available appts. Advised the emergency room for evaluation.   Will comply with recommendation: Yes  If further questions/concerns or if symptoms do not improve, worsen or new symptoms develop, call your PCP or [facility] as soon as possible.      Guideline used: Nosebleed  Telephone Triage Protocols for Nurses, Fifth Edition, Fatuma Amado RN    "

## 2018-04-05 DIAGNOSIS — I25.5 ISCHEMIC CARDIOMYOPATHY: ICD-10-CM

## 2018-04-05 RX ORDER — FUROSEMIDE 20 MG
20 TABLET ORAL DAILY
Qty: 90 TABLET | Refills: 3 | Status: SHIPPED | OUTPATIENT
Start: 2018-04-05 | End: 2019-03-27

## 2018-04-19 ENCOUNTER — ANTICOAGULATION THERAPY VISIT (OUTPATIENT)
Dept: ANTICOAGULATION | Facility: OTHER | Age: 83
End: 2018-04-19
Payer: COMMERCIAL

## 2018-04-19 DIAGNOSIS — Z79.01 LONG-TERM (CURRENT) USE OF ANTICOAGULANTS: ICD-10-CM

## 2018-04-19 LAB — INR POINT OF CARE: 3 (ref 0.86–1.14)

## 2018-04-19 PROCEDURE — 85610 PROTHROMBIN TIME: CPT | Mod: QW

## 2018-04-19 PROCEDURE — 99207 ZZC NO CHARGE NURSE ONLY: CPT

## 2018-04-19 PROCEDURE — 36416 COLLJ CAPILLARY BLOOD SPEC: CPT

## 2018-04-19 NOTE — MR AVS SNAPSHOT
Kevin Partida   4/19/2018 9:30 AM   Anticoagulation Therapy Visit    Description:  83 year old male   Provider:  ARNIE ANTI COAG   Department:  [facility]           INR as of 4/19/2018     Today's INR 3.0      Anticoagulation Summary as of 4/19/2018     INR goal 2.0-3.0   Today's INR 3.0   Full instructions 7.5 mg on Tue, Thu, Sat; 5 mg all other days   Next INR check 5/31/2018    Indications   Long-term (current) use of anticoagulants [Z79.01] [Z79.01]         Your next Anticoagulation Clinic appointment(s)     Apr 19, 2018  9:30 AM CDT   [facility]agulation Visit with  ANTI COAG   [facility]    [address] [facility] [address]   [phone]            May 31, 2018  9:30 AM CDT   Anticoagulation Visit with  ANTI COAG   [facility]    [address] [facility] [address]   [phone]              Contact Numbers     Clinic Number:         April 2018 Details    Sun Mon Tue Wed Thu Fri Sat     1               2               3               4               5               6               7                 8               9               10               11               12               13               14                 15               16               17               18               19      7.5 mg   See details      20      5 mg         21      7.5 mg           22      5 mg         23      5 mg         24      7.5 mg         25      5 mg         26      7.5 mg         27      5 mg         28      7.5 mg           29      5 mg         30      5 mg               Date Details   04/19 This INR check               How to take your warfarin dose     To take:  5 mg Take 1 of the 5 mg tablets.    To take:  7.5 mg Take 1.5 of the 5 mg tablets.           May 2018 Details    Sun Mon Tue Wed Thu Fri Sat       1      7.5 mg         2      5 mg         3      7.5 mg         4      5 mg         5      7.5 mg           6      5 mg          7      5 mg         8      7.5 mg         9      5 mg         10      7.5 mg         11      5 mg         12      7.5 mg           13      5 mg         14      5 mg         15      7.5 mg         16      5 mg         17      7.5 mg         18      5 mg         19      7.5 mg           20      5 mg         21      5 mg         22      7.5 mg         23      5 mg         24      7.5 mg         25      5 mg         26      7.5 mg           27      5 mg         28      5 mg         29      7.5 mg         30      5 mg         31               Date Details   No additional details    Date of next INR:  5/31/2018         How to take your warfarin dose     To take:  5 mg Take 1 of the 5 mg tablets.    To take:  7.5 mg Take 1.5 of the 5 mg tablets.

## 2018-04-19 NOTE — PROGRESS NOTES
ANTICOAGULATION FOLLOW-UP CLINIC VISIT    Patient Name:  Kevin Partida  Date:  4/19/2018  Contact Type:  Face to Face    SUBJECTIVE:     Patient Findings     Positives No Problem Findings           OBJECTIVE    INR Protime   Date Value Ref Range Status   04/19/2018 3.0 (A) 0.86 - 1.14 Final       ASSESSMENT / PLAN  INR assessment THER    Recheck INR In: 6 WEEKS    INR Location Clinic      Anticoagulation Summary as of 4/19/2018     INR goal 2.0-3.0   Today's INR 3.0   Maintenance plan 7.5 mg (5 mg x 1.5) on Tue, Thu, Sat; 5 mg (5 mg x 1) all other days   Full instructions 7.5 mg on Tue, Thu, Sat; 5 mg all other days   Weekly total 42.5 mg   No change documented Diego Almanzar RN   Plan last modified Diego Almanzar RN (3/14/2016)   Next INR check 5/31/2018   Target end date     Indications   Long-term (current) use of anticoagulants [Z79.01] [Z79.01]         Anticoagulation Episode Summary     INR check location     Preferred lab     Send INR reminders to [facility] POOL    Comments 5 mg tabs, PM dose, print out, BP      Anticoagulation Care Providers     Provider Role Specialty Phone number    Taqueria Borrego MD [facility] [phone]            See the Encounter Report to view Anticoagulation Flowsheet and Dosing Calendar (Go to Encounters tab in chart review, and find the Anticoagulation Therapy Visit)    Dosage adjustment made based on physician directed care plan.    Diego Almanzar, RN

## 2018-04-26 ENCOUNTER — TRANSFERRED RECORDS (OUTPATIENT)
Dept: HEALTH INFORMATION MANAGEMENT | Facility: CLINIC | Age: 83
End: 2018-04-26

## 2018-04-26 LAB
ALT SERPL-CCNC: 36 U/L (ref 0–65)
AST SERPL-CCNC: 27 U/L (ref 5–40)
CHOLEST SERPL-MCNC: 145 MG/DL
CREAT SERPL-MCNC: 1 MG/DL (ref 0.5–1.5)
GFR SERPL CREATININE-BSD FRML MDRD: >60 ML/MIN/1.73M2
GLUCOSE SERPL-MCNC: 113 MG/DL (ref 70–100)
HDLC SERPL-MCNC: 40 MG/DL (ref 40–67)
LDLC SERPL CALC-MCNC: 78 MG/DL
POTASSIUM SERPL-SCNC: 4.4 MMOL/L (ref 3.5–5)
TRIGL SERPL-MCNC: 137 MG/DL

## 2018-05-02 DIAGNOSIS — E78.5 HYPERLIPIDEMIA LDL GOAL <70: ICD-10-CM

## 2018-05-02 RX ORDER — SIMVASTATIN 80 MG
80 TABLET ORAL DAILY
Qty: 90 TABLET | Refills: 1 | Status: SHIPPED | OUTPATIENT
Start: 2018-05-02 | End: 2018-11-12

## 2018-05-31 ENCOUNTER — ANTICOAGULATION THERAPY VISIT (OUTPATIENT)
Dept: ANTICOAGULATION | Facility: OTHER | Age: 83
End: 2018-05-31
Payer: COMMERCIAL

## 2018-05-31 DIAGNOSIS — Z79.01 LONG-TERM (CURRENT) USE OF ANTICOAGULANTS: ICD-10-CM

## 2018-05-31 LAB — INR POINT OF CARE: 4.5 (ref 0.86–1.14)

## 2018-05-31 PROCEDURE — 36416 COLLJ CAPILLARY BLOOD SPEC: CPT

## 2018-05-31 PROCEDURE — 85610 PROTHROMBIN TIME: CPT | Mod: QW

## 2018-05-31 PROCEDURE — 99207 ZZC NO CHARGE NURSE ONLY: CPT

## 2018-05-31 NOTE — MR AVS SNAPSHOT
Kevin Partida   5/31/2018 9:30 AM   Anticoagulation Therapy Visit    Description:  83 year old male   Provider:  ARNIE ANTI COAG   Department:  [facility]           INR as of 5/31/2018     Today's INR 4.5!      Anticoagulation Summary as of 5/31/2018     INR goal 2.0-3.0   Today's INR 4.5!   Full warfarin instructions 5/31: Hold; Otherwise 7.5 mg on Tue, Sat; 5 mg all other days   Next INR check 6/28/2018    Indications   Long-term (current) use of anticoagulants [Z79.01] [Z79.01]         Your next Anticoagulation Clinic appointment(s)     May 31, 2018  9:30 AM CDT   [facility]  [facility]   [facility]    [address] [facility] [address]   [phone]            Jun 28, 2018  9:30 AM CDT   Anticoagulation Visit with  ANTI COAG   [facility]    [address] [facility] [address]   [phone]              Contact Numbers     Clinic Number:         May 2018 Details    Sun Mon Tue Wed Thu Fri Sat       1               2               3               4               5                 6               7               8               9               10               11               12                 13               14               15               16               17               18               19                 20               21               22               23               24               25               26                 27               28               29               30               31      Hold   See details         Date Details   05/31 This INR check               How to take your warfarin dose     Hold Do not take your warfarin dose. See the Details table to the right for additional instructions.                June 2018 Details    Sun Mon Tue Wed Thu Fri Sat          1      5 mg         2      7.5 mg           3      5 mg         4      5 mg         5      7.5 mg         6      5 mg          7      5 mg         8      5 mg         9      7.5 mg           10      5 mg         11      5 mg         12      7.5 mg         13      5 mg         14      5 mg         15      5 mg         16      7.5 mg           17      5 mg         18      5 mg         19      7.5 mg         20      5 mg         21      5 mg         22      5 mg         23      7.5 mg           24      5 mg         25      5 mg         26      7.5 mg         27      5 mg         28            29               30                Date Details   No additional details    Date of next INR:  6/28/2018         How to take your warfarin dose     To take:  5 mg Take 1 of the 5 mg tablets.    To take:  7.5 mg Take 1.5 of the 5 mg tablets.

## 2018-05-31 NOTE — PROGRESS NOTES
ANTICOAGULATION FOLLOW-UP CLINIC VISIT    Patient Name:  Kevin Partida  Date:  5/31/2018  Contact Type:  Face to Face    SUBJECTIVE:     Patient Findings     Positives Unexplained INR or factor level change           OBJECTIVE    INR Protime   Date Value Ref Range Status   05/31/2018 4.5 (A) 0.86 - 1.14 Final       ASSESSMENT / PLAN  INR assessment THER    Recheck INR In: 4 WEEKS    INR Location Clinic      Anticoagulation Summary as of 5/31/2018     INR goal 2.0-3.0   Today's INR 4.5!   Warfarin maintenance plan 7.5 mg (5 mg x 1.5) on Tue, Sat; 5 mg (5 mg x 1) all other days   Full warfarin instructions 5/31: Hold; Otherwise 7.5 mg on Tue, Sat; 5 mg all other days   Weekly warfarin total 40 mg   Plan last modified Diego Almanzar RN (5/31/2018)   Next INR check 6/28/2018   Target end date     Indications   Long-term (current) use of anticoagulants [Z79.01] [Z79.01]         Anticoagulation Episode Summary     INR check location     Preferred lab     Send INR reminders to [facility] POOL    Comments 5 mg tabs, PM dose, print out, BP      Anticoagulation Care Providers     Provider Role Specialty Phone number    Taqueria Borrego MD [facility] [phone]            See the Encounter Report to view Anticoagulation Flowsheet and Dosing Calendar (Go to Encounters tab in chart review, and find the Anticoagulation Therapy Visit)    Dosage adjustment made based on physician directed care plan.    Diego Almanzar RN

## 2018-06-18 DIAGNOSIS — I48.20 CHRONIC ATRIAL FIBRILLATION (H): ICD-10-CM

## 2018-06-18 RX ORDER — WARFARIN SODIUM 5 MG/1
TABLET ORAL
Qty: 105 TABLET | Refills: 0 | Status: SHIPPED | OUTPATIENT
Start: 2018-06-18 | End: 2018-09-10

## 2018-06-18 NOTE — TELEPHONE ENCOUNTER
"Requested Prescriptions   Pending Prescriptions Disp Refills     warfarin (JANTOVEN) 5 MG tablet 105 tablet 0    Last Written Prescription Date:  3/19/18  Last Fill Quantity: 105,  # refills: 0   Last office visit: 6/30/2017 with prescribing provider:  6/30/17   Future Office Visit:     Sig: Take 7.5 mg on Tuesday, Thursday, Saturday and 5 mg the rest of the week or as directed by coumadin clinic    Vitamin K Antagonists Failed    6/18/2018 10:57 AM       Failed - INR is within goal in the past 6 weeks    Confirm INR is within goal in the past 6 weeks.     Recent Labs   Lab Test 05/31/18   INR  4.5*                      Passed - Recent (12 mo) or future (30 days) visit within the authorizing provider's specialty    Patient had office visit in the last 12 months or has a visit in the next 30 days with authorizing provider or within the authorizing provider's specialty.  See \"Patient Info\" tab in inbasket, or \"Choose Columns\" in Meds & Orders section of the refill encounter.           Passed - Patient is 18 years of age or older          "

## 2018-06-28 ENCOUNTER — ANTICOAGULATION THERAPY VISIT (OUTPATIENT)
Dept: ANTICOAGULATION | Facility: OTHER | Age: 83
End: 2018-06-28
Payer: COMMERCIAL

## 2018-06-28 DIAGNOSIS — Z79.01 LONG-TERM (CURRENT) USE OF ANTICOAGULANTS: ICD-10-CM

## 2018-06-28 LAB — INR POINT OF CARE: 3.2 (ref 0.86–1.14)

## 2018-06-28 PROCEDURE — 36416 COLLJ CAPILLARY BLOOD SPEC: CPT

## 2018-06-28 PROCEDURE — 99207 ZZC NO CHARGE NURSE ONLY: CPT

## 2018-06-28 PROCEDURE — 85610 PROTHROMBIN TIME: CPT | Mod: QW

## 2018-06-28 NOTE — PROGRESS NOTES
ANTICOAGULATION FOLLOW-UP CLINIC VISIT    Patient Name:  Kevin Partida  Date:  6/28/2018  Contact Type:  Face to Face    SUBJECTIVE:     Patient Findings     Positives No Problem Findings           OBJECTIVE    INR Protime   Date Value Ref Range Status   06/28/2018 3.2 (A) 0.86 - 1.14 Final       ASSESSMENT / PLAN  INR assessment THER    Recheck INR In: 6 WEEKS    INR Location Clinic      Anticoagulation Summary as of 6/28/2018     INR goal 2.0-3.0   Today's INR 3.2!   Warfarin maintenance plan 7.5 mg (5 mg x 1.5) on Tue, Sat; 5 mg (5 mg x 1) all other days   Full warfarin instructions 7.5 mg on Tue, Sat; 5 mg all other days   Weekly warfarin total 40 mg   No change documented Diego Almanzar RN   Plan last modified Diego Almanzar RN (5/31/2018)   Next INR check 8/9/2018   Target end date     Indications   Long-term (current) use of anticoagulants [Z79.01] [Z79.01]         Anticoagulation Episode Summary     INR check location     Preferred lab     Send INR reminders to [facility] POOL    Comments 5 mg tabs, PM dose, print out, BP      Anticoagulation Care Providers     Provider Role Specialty Phone number    Taqueria Borrego MD [facility] [phone]            See the Encounter Report to view Anticoagulation Flowsheet and Dosing Calendar (Go to Encounters tab in chart review, and find the Anticoagulation Therapy Visit)    Dosage adjustment made based on physician directed care plan.    Diego Almanzar RN

## 2018-06-28 NOTE — MR AVS SNAPSHOT
Kevin Partida   6/28/2018 9:30 AM   Anticoagulation Therapy Visit    Description:  83 year old male   Provider:  ARNIE ANTI COAG   Department:  [facility]           INR as of 6/28/2018     Today's INR 3.2!      Anticoagulation Summary as of 6/28/2018     INR goal 2.0-3.0   Today's INR 3.2!   Full warfarin instructions 7.5 mg on Tue, Sat; 5 mg all other days   Next INR check 8/9/2018    Indications   Long-term (current) use of anticoagulants [Z79.01] [Z79.01]         Your next Anticoagulation Clinic appointment(s)     Jun 28, 2018  9:30 AM CDT   [facility]  [facility]    [address] [facility] [address]   [phone]            Aug 09, 2018  9:30 AM CDT   Anticoagulation Visit with  ANTI COAG   [facility]    [address] [facility] [address]   [phone]              Contact Numbers     Clinic Number:         June 2018 Details    Sun Mon Tue Wed Thu Fri Sat          1               2                 3               4               5               6               7               8               9                 10               11               12               13               14               15               16                 17               18               19               20               21               22               23                 24               25               26               27               28      5 mg   See details      29      5 mg         30      7.5 mg          Date Details   06/28 This INR check               How to take your warfarin dose     To take:  5 mg Take 1 of the 5 mg tablets.    To take:  7.5 mg Take 1.5 of the 5 mg tablets.           July 2018 Details    Sun Mon Tue Wed Thu Fri Sat     1      5 mg         2      5 mg         3      7.5 mg         4      5 mg         5      5 mg         6      5 mg         7      7.5 mg           8      5 mg         9       5 mg         10      7.5 mg         11      5 mg         12      5 mg         13      5 mg         14      7.5 mg           15      5 mg         16      5 mg         17      7.5 mg         18      5 mg         19      5 mg         20      5 mg         21      7.5 mg           22      5 mg         23      5 mg         24      7.5 mg         25      5 mg         26      5 mg         27      5 mg         28      7.5 mg           29      5 mg         30      5 mg         31      7.5 mg              Date Details   No additional details            How to take your warfarin dose     To take:  5 mg Take 1 of the 5 mg tablets.    To take:  7.5 mg Take 1.5 of the 5 mg tablets.           August 2018 Details    Sun Mon Tue Wed Thu Fri Sat        1      5 mg         2      5 mg         3      5 mg         4      7.5 mg           5      5 mg         6      5 mg         7      7.5 mg         8      5 mg         9            10               11                 12               13               14               15               16               17               18                 19               20               21               22               23               24               25                 26               27               28               29               30               31                 Date Details   No additional details    Date of next INR:  8/9/2018         How to take your warfarin dose     To take:  5 mg Take 1 of the 5 mg tablets.    To take:  7.5 mg Take 1.5 of the 5 mg tablets.

## 2018-08-09 ENCOUNTER — TELEPHONE (OUTPATIENT)
Dept: ANTICOAGULATION | Facility: OTHER | Age: 83
End: 2018-08-09

## 2018-08-09 ENCOUNTER — ANTICOAGULATION THERAPY VISIT (OUTPATIENT)
Dept: ANTICOAGULATION | Facility: OTHER | Age: 83
End: 2018-08-09
Payer: COMMERCIAL

## 2018-08-09 DIAGNOSIS — Z79.01 LONG-TERM (CURRENT) USE OF ANTICOAGULANTS: Primary | ICD-10-CM

## 2018-08-09 DIAGNOSIS — I48.20 CHRONIC ATRIAL FIBRILLATION (H): ICD-10-CM

## 2018-08-09 DIAGNOSIS — Z79.01 LONG-TERM (CURRENT) USE OF ANTICOAGULANTS: ICD-10-CM

## 2018-08-09 LAB — INR POINT OF CARE: 3.4 (ref 0.86–1.14)

## 2018-08-09 PROCEDURE — 85610 PROTHROMBIN TIME: CPT | Mod: QW

## 2018-08-09 PROCEDURE — 36416 COLLJ CAPILLARY BLOOD SPEC: CPT

## 2018-08-09 PROCEDURE — 99207 ZZC NO CHARGE NURSE ONLY: CPT

## 2018-08-09 NOTE — TELEPHONE ENCOUNTER
Please review and renew this patients INR referral, orders pending. Thank you!      Diego Almanzar RN, BSN

## 2018-08-09 NOTE — MR AVS SNAPSHOT
Brown JD Partida   8/9/2018 9:30 AM   Anticoagulation Therapy Visit    Description:  83 year old male   Provider:  ARNIE ANTI COAG   Department:  [facility]           INR as of 8/9/2018     Today's INR 3.4!      Anticoagulation Summary as of 8/9/2018     INR goal 2.0-3.0   Today's INR 3.4!   Full warfarin instructions 7.5 mg on Tue, Sat; 5 mg all other days   Next INR check 9/20/2018    Indications   Long-term (current) use of anticoagulants [Z79.01] [Z79.01]         Your next Anticoagulation Clinic appointment(s)     Aug 09, 2018  9:30 AM CDT   [facility]  ANTI CO[facility]   [facility]    [address] [facility] [address]   [phone]            Sep 20, 2018  9:30 AM CDT   Anticoagulation Visit with  ANTI COAG   [facility]    [address] [facility] [address]   [phone]              Contact Numbers     Clinic Number:         August 2018 Details    Sun Mon Tue Wed Thu Fri Sat        1               2               3               4                 5               6               7               8               9      5 mg   See details      10      5 mg         11      7.5 mg           12      5 mg         13      5 mg         14      7.5 mg         15      5 mg         16      5 mg         17      5 mg         18      7.5 mg           19      5 mg         20      5 mg         21      7.5 mg         22      5 mg         23      5 mg         24      5 mg         25      7.5 mg           26      5 mg         27      5 mg         28      7.5 mg         29      5 mg         30      5 mg         31      5 mg           Date Details   08/09 This INR check               How to take your warfarin dose     To take:  5 mg Take 1 of the 5 mg tablets.    To take:  7.5 mg Take 1.5 of the 5 mg tablets.           September 2018 Details    Sun Mon Tue Wed Thu Fri Sat           1      7.5 mg           2      5 mg         3      5  mg         4      7.5 mg         5      5 mg         6      5 mg         7      5 mg         8      7.5 mg           9      5 mg         10      5 mg         11      7.5 mg         12      5 mg         13      5 mg         14      5 mg         15      7.5 mg           16      5 mg         17      5 mg         18      7.5 mg         19      5 mg         20            21               22                 23               24               25               26               27               28               29                 30                      Date Details   No additional details    Date of next INR:  9/20/2018         How to take your warfarin dose     To take:  5 mg Take 1 of the 5 mg tablets.    To take:  7.5 mg Take 1.5 of the 5 mg tablets.

## 2018-08-09 NOTE — PROGRESS NOTES
ANTICOAGULATION FOLLOW-UP CLINIC VISIT    Patient Name:  Kevin Partida  Date:  8/9/2018  Contact Type:  Face to Face    SUBJECTIVE:     Patient Findings     Positives Unexplained INR or factor level change    Comments Pt will add some additional greens in his diet the next day or so.            OBJECTIVE    INR Protime   Date Value Ref Range Status   08/09/2018 3.4 (A) 0.86 - 1.14 Final       ASSESSMENT / PLAN  INR assessment THER    Recheck INR In: 6 WEEKS    INR Location Clinic      Anticoagulation Summary as of 8/9/2018     INR goal 2.0-3.0   Today's INR 3.4!   Warfarin maintenance plan 7.5 mg (5 mg x 1.5) on Tue, Sat; 5 mg (5 mg x 1) all other days   Full warfarin instructions 7.5 mg on Tue, Sat; 5 mg all other days   Weekly warfarin total 40 mg   No change documented Diego Almanzar RN   Plan last modified Diego Almanzar RN (5/31/2018)   Next INR check 9/20/2018   Target end date     Indications   Long-term (current) use of anticoagulants [Z79.01] [Z79.01]         Anticoagulation Episode Summary     INR check location     Preferred lab     Send INR reminders to [facility] POOL    Comments 5 mg tabs, PM dose, print out, BP      Anticoagulation Care Providers     Provider Role Specialty Phone number    Taqueria Borrego MD [facility] [phone]            See the Encounter Report to view Anticoagulation Flowsheet and Dosing Calendar (Go to Encounters tab in chart review, and find the Anticoagulation Therapy Visit)    Dosage adjustment made based on physician directed care plan.    Diego Almanzar RN

## 2018-09-10 DIAGNOSIS — I48.20 CHRONIC ATRIAL FIBRILLATION (H): ICD-10-CM

## 2018-09-10 RX ORDER — WARFARIN SODIUM 5 MG/1
TABLET ORAL
Qty: 105 TABLET | Refills: 0 | Status: SHIPPED | OUTPATIENT
Start: 2018-09-10 | End: 2019-01-04

## 2018-09-10 NOTE — TELEPHONE ENCOUNTER
"Requested Prescriptions   Pending Prescriptions Disp Refills     JANTOVEN 5 MG tablet [Pharmacy Med Name: JANTOVEN 5MG TABS] 105 tablet 0    Last Written Prescription Date:  6-18-18  Last Fill Quantity: 10,  # refills: 5   Last office visit: 6/30/2017 with prescribing provider:  6/30/17   Future Office Visit:     Sig: TAKE ONE AND ONE-HALF TABLETS BY MOUTH ON TUESDAY AND SATURDAY AND ONE TABLET ALL OTHER DAYS OF THE WEEK OR AS DIRECTED BY COUMADIN CLINIC    Vitamin K Antagonists Failed    9/10/2018  3:57 PM       Failed - INR is within goal in the past 6 weeks    Confirm INR is within goal in the past 6 weeks.     Recent Labs   Lab Test 08/09/18   INR  3.4*                      Passed - Recent (12 mo) or future (30 days) visit within the authorizing provider's specialty    Patient had office visit in the last 12 months or has a visit in the next 30 days with authorizing provider or within the authorizing provider's specialty.  See \"Patient Info\" tab in inbasket, or \"Choose Columns\" in Meds & Orders section of the refill encounter.           Passed - Patient is 18 years of age or older          "

## 2018-09-20 ENCOUNTER — ANTICOAGULATION THERAPY VISIT (OUTPATIENT)
Dept: ANTICOAGULATION | Facility: OTHER | Age: 83
End: 2018-09-20
Payer: COMMERCIAL

## 2018-09-20 DIAGNOSIS — Z79.01 LONG-TERM (CURRENT) USE OF ANTICOAGULANTS: ICD-10-CM

## 2018-09-20 LAB — INR POINT OF CARE: 4.3 (ref 0.86–1.14)

## 2018-09-20 PROCEDURE — 36416 COLLJ CAPILLARY BLOOD SPEC: CPT

## 2018-09-20 PROCEDURE — 85610 PROTHROMBIN TIME: CPT | Mod: QW

## 2018-10-11 ENCOUNTER — ALLIED HEALTH/NURSE VISIT (OUTPATIENT)
Dept: FAMILY MEDICINE | Facility: OTHER | Age: 83
End: 2018-10-11
Payer: COMMERCIAL

## 2018-10-11 ENCOUNTER — ANTICOAGULATION THERAPY VISIT (OUTPATIENT)
Dept: ANTICOAGULATION | Facility: OTHER | Age: 83
End: 2018-10-11
Payer: COMMERCIAL

## 2018-10-11 DIAGNOSIS — Z23 NEED FOR VACCINATION: Primary | ICD-10-CM

## 2018-10-11 LAB — INR POINT OF CARE: 2.6 (ref 0.86–1.14)

## 2018-10-11 PROCEDURE — G0009 ADMIN PNEUMOCOCCAL VACCINE: HCPCS

## 2018-10-11 PROCEDURE — 36416 COLLJ CAPILLARY BLOOD SPEC: CPT

## 2018-10-11 PROCEDURE — 90670 PCV13 VACCINE IM: CPT

## 2018-10-11 PROCEDURE — 85610 PROTHROMBIN TIME: CPT | Mod: QW

## 2018-10-11 PROCEDURE — 99207 ZZC NO CHARGE NURSE ONLY: CPT

## 2018-10-11 NOTE — MR AVS SNAPSHOT
After Visit Summary   10/11/2018    Kevin Partida    MRN: [id number]           Patient Information     Date Of Birth          1935        Visit Information        Provider Department      10/11/2018 9:45 AM ARNIE FRITZ MA [facility]        Today's Diagnoses     Need for vaccination    -  1       Follow-ups after your visit        Your next 10 appointments already scheduled     Nov 29, 2018  9:30 AM CST   Anticoagulation [facility] with ARNIE ANTI COAG   [facility]    [address] [facility] [id number] [phone]              Who to contact     If you have questions or need follow up information about today's clinic visit or your schedule please contact [facility] directly at [phone].  Normal or non-critical lab and imaging results will be communicated to you by [employer], letter or phone within 4 business days after the clinic has received the results. If you do not hear from us within 7 days, please contact the clinic through Xencor[employer] or phone. If you have a critical or abnormal lab result, we will notify you by phone as soon as possible.  Submit refill requests through [employer] or call your pharmacy and they will forward the refill request to us. Please allow 3 business days for your refill to be completed.          Additional Information About Your Visit        MyChart Information     [employer] gives you secure access to your electronic health record. If you see a primary care provider, you can also send messages to your care team and make appointments. If you have questions, please call your primary care clinic.  If you do not have a primary care provider, please call [phone] and they will assist you.        Care EveryWhere ID     This is your Care EveryWhere ID. This could be used by other organizations to access your [facility] medical records  [id number]         Blood Pressure from Last 3 Encounters:   03/28/18 106/78    03/20/18 124/70   06/30/17 96/58    Weight from Last 3 Encounters:   03/28/18 235 lb (106.6 kg)   03/20/18 237 lb 6.4 oz (107.7 kg)   06/30/17 220 lb 14.4 oz (100.2 kg)              We Performed the Following     ADMIN PNEUMOCOCCAL VACCINE (For MEDICARE Patients ONLY) []     Pneumococcal vaccine 13 valent PCV13 IM (Prevnar) [57497]        Primary Care Provider Office Phone # Fax #    Chad Dixonalonso,  [phone]       [address] [place] [address]        Equal Access to Services     [facility] Center[facility]ITA : Hadii markus lee Somalachi, waaxda lulisaadaha, qaybta kaalmada steven, alia garcia . So [facility] [place] [facility] [phone].    ATENCIÓN: Si habla español, tiene a owen disposición servicios gratuitos de asistencia lingüística. [facility] [phone].    We comply with applicable federal civil rights laws and [place] laws. We do not discriminate on the basis of race, color, national origin, age, disability, sex, sexual orientation, or gender identity.            Thank you!     Thank you for choosing [facility]  for your care. Our goal is always to provide you with excellent care. Hearing back from our patients is one way we can continue to improve our services. Please take a few minutes to complete the written survey that you may receive in the mail after your visit with us. Thank you!             Your Updated Medication List - Protect others around you: Learn how to safely use, store and throw away your medicines at www.disposemymeds.org.          This list is accurate as of 10/11/18  4:30 PM.  Always use your most recent med list.                   Brand Name Dispense Instructions for use Diagnosis    aspirin 81 MG tablet      Take 1 tablet by mouth daily.        carvedilol 12.5 MG tablet    COREG    180 tablet    Take 1 tablet (12.5 mg) by mouth 2 times daily (with meals)    Coronary artery disease involving native coronary artery without angina  pectoris       furosemide 20 MG tablet    LASIX    90 tablet    Take 1 tablet (20 mg) by mouth daily    Ischemic cardiomyopathy       JANTOVEN 5 MG tablet   Generic drug:  warfarin     105 tablet    TAKE ONE AND ONE-HALF TABLETS BY MOUTH ON TUESDAY AND SATURDAY AND ONE TABLET ALL OTHER DAYS OF THE WEEK OR AS DIRECTED BY COUMADIN CLINIC    Chronic atrial fibrillation (H)       lisinopril 10 MG tablet    PRINIVIL/ZESTRIL    90 tablet    Take 1 tablet (10 mg) by mouth daily    Coronary artery disease involving native coronary artery without angina pectoris       OMEGA-3 FISH OIL PO      Take 1 g by mouth daily        simvastatin 80 MG tablet    ZOCOR    90 tablet    Take 1 tablet (80 mg) by mouth daily    Hyperlipidemia LDL goal <70

## 2018-10-11 NOTE — MR AVS SNAPSHOT
Kevin Pratida   10/11/2018 9:30 AM   Anticoagulation Therapy Visit    Description:  83 year old male   Provider:  ARNIE ANTI COAG   Department:  [facility]           INR as of 10/11/2018     Today's INR 2.6      Anticoagulation Summary as of 10/11/2018     INR goal 2.0-3.0   Today's INR 2.6   Full warfarin instructions 7.5 mg on Sat; 5 mg all other days   Next INR check 11/29/2018    Indications   Long-term (current) use of anticoagulants [Z79.01] [Z79.01]         Your next Anticoagulation Clinic appointment(s)     Nov 29, 2018  9:30 AM CST   Anticoagulation [facility] with ARNIE ANTI VINCE   [facility]    [address] [facility] [address] [phone]              Contact Numbers     Clinic Number:         October 2018 Details    Sun Mon Tue Wed Thu Fri Sat      1               2               3               4               5               6                 7               8               9               10               11      5 mg   See details      12      5 mg         13      7.5 mg           14      5 mg         15      5 mg         16      5 mg         17      5 mg         18      5 mg         19      5 mg         20      7.5 mg           21      5 mg         22      5 mg         23      5 mg         24      5 mg         25      5 mg         26      5 mg         27      7.5 mg           28      5 mg         29      5 mg         30      5 mg         31      5 mg             Date Details   10/11 This INR check               How to take your warfarin dose     To take:  5 mg Take 1 of the 5 mg tablets.    To take:  7.5 mg Take 1.5 of the 5 mg tablets.           November 2018 Details    Sun Mon Tue Wed Thu Fri Sat         1      5 mg         2      5 mg         3      7.5 mg           4      5 mg         5      5 mg         6      5 mg         7      5 mg         8      5 mg         9      5 mg         10      7.5 mg           11      5 mg         12      5 mg         13       5 mg         14      5 mg         15      5 mg         16      5 mg         17      7.5 mg           18      5 mg         19      5 mg         20      5 mg         21      5 mg         22      5 mg         23      5 mg         24      7.5 mg           25      5 mg         26      5 mg         27      5 mg         28      5 mg         29            30                 Date Details   No additional details    Date of next INR:  11/29/2018         How to take your warfarin dose     To take:  5 mg Take 1 of the 5 mg tablets.    To take:  7.5 mg Take 1.5 of the 5 mg tablets.

## 2018-10-11 NOTE — NURSING NOTE
Screening Questionnaire for Adult Immunization    Are you sick today?   No   Do you have allergies to medications, food, a vaccine component or latex?   No   Have you ever had a serious reaction after receiving a vaccination?   No   Do you have a long-term health problem with heart disease, lung disease, asthma, kidney disease, metabolic disease (e.g. diabetes), anemia, or other blood disorder?   No   Do you have cancer, leukemia, HIV/AIDS, or any other immune system problem?   No   In the past 3 months, have you taken medications that affect  your immune system, such as prednisone, other steroids, or anticancer drugs; drugs for the treatment of rheumatoid arthritis, Crohn s disease, or psoriasis; or have you had radiation treatments?   No   Have you had a seizure, or a brain or other nervous system problem?   No   During the past year, have you received a transfusion of blood or blood     products, or been given immune (gamma) globulin or antiviral drug?   No   For women: Are you pregnant or is there a chance you could become        pregnant during the next month?   No   Have you received any vaccinations in the past 4 weeks?   No     Immunization questionnaire answers were all negative.      Prior to injection verified patient identity using patient's name and date of birth.  Due to injection administration, patient instructed to remain in clinic for 15 minutes  afterwards, and to report any adverse reaction to me immediately.    Lyly Lamas MA     10/11/2018

## 2018-10-11 NOTE — PROGRESS NOTES
ANTICOAGULATION FOLLOW-UP CLINIC VISIT    Patient Name:  Kevin Partida  Date:  10/11/2018  Contact Type:  Face to Face    SUBJECTIVE:     Patient Findings     Positives No Problem Findings           OBJECTIVE    INR Protime   Date Value Ref Range Status   10/11/2018 2.6 (A) 0.86 - 1.14 Final       ASSESSMENT / PLAN  INR assessment THER    Recheck INR In: 7 WEEKS    INR Location Clinic      Anticoagulation Summary as of 10/11/2018     INR goal 2.0-3.0   Today's INR 2.6   Warfarin maintenance plan 7.5 mg (5 mg x 1.5) on Sat; 5 mg (5 mg x 1) all other days   Full warfarin instructions 7.5 mg on Sat; 5 mg all other days   Weekly warfarin total 37.5 mg   No change documented Diego Almanzar RN   Plan last modified Diego Almanzar RN (9/20/2018)   Next INR check 11/29/2018   Target end date     Indications   Long-term (current) use of anticoagulants [Z79.01] [Z79.01]         Anticoagulation Episode Summary     INR check location     Preferred lab     Send INR reminders to [facility] POOL    Comments 5 mg tabs, PM dose, print out, BP      Anticoagulation Care Providers     Provider Role Specialty Phone number    Taqueria Borrego MD [facility] [phone]            See the Encounter Report to view Anticoagulation Flowsheet and Dosing Calendar (Go to Encounters tab in chart review, and find the Anticoagulation Therapy Visit)    Dosage adjustment made based on physician directed care plan.    Diego Almanzar RN

## 2018-10-29 ENCOUNTER — TELEPHONE (OUTPATIENT)
Dept: INTERNAL MEDICINE | Facility: OTHER | Age: 83
End: 2018-10-29

## 2018-10-29 DIAGNOSIS — L02.212 CUTANEOUS ABSCESS OF BACK EXCLUDING BUTTOCKS: Primary | ICD-10-CM

## 2018-10-29 RX ORDER — AMOXICILLIN AND CLAVULANATE POTASSIUM 500; 125 MG/1; MG/1
1 TABLET, FILM COATED ORAL 2 TIMES DAILY
Qty: 14 TABLET | Refills: 0 | Status: SHIPPED | OUTPATIENT
Start: 2018-10-29 | End: 2018-11-02

## 2018-10-29 NOTE — TELEPHONE ENCOUNTER
Per Dr. Harrison linares to use dr only spot.    Wife called and message was relayed and appt was made.  Siobhan Orozco, [facility]

## 2018-10-29 NOTE — TELEPHONE ENCOUNTER
Reason for Call:  Same Day Appointment, Requested Provider:  Chad Gee D.O.    PCP: Chad Gee    Reason for visit: Patient has a large raw open wound. It is oozing a grey discharge. Would like to have it looked at today.     Duration of symptoms: it was very small round [place] about 1 week ago. Then it was very red around it. Now it is open.     Have you been treated for this in the past? No    Additional comments:     Can we leave a detailed message on this number? YES    Phone number patient can be reached at: Home number on file [phone] (home)    Best Time: any    Call taken on 10/29/2018 at 7:26 AM by Lavern Borrego

## 2018-10-29 NOTE — TELEPHONE ENCOUNTER
A prescription for Augmentin has been sent to the [facility] pharmacy. He should take the antibiotic twice daily and bandage the abscess on the back with absorbent dressings. I would then like to see the patient either later this week or early next week. If it worsens along the way, they should notify the clinic.    Marlen

## 2018-10-29 NOTE — TELEPHONE ENCOUNTER
Called and spoke to the patient and his wife. Wife did most of the talking as the area is on the patient's back so he has not seen it.     Wife said about a week ago the patient had a small pimple looking area on his back. She said it was red around the pimple. Redness was the size of a dime. Wife said patient did not want to come in and let it go. Wife said a few days later he showed it to her again. Wife said the redness was larger and it had an area above it that looked like a ping pong ball under the skin. Wife said the patient showed it again to her this morning. She said the area is now open, raw, very red, and oozing grey stuff that stinks really bad. Wife said this area has been there about a week and seems to be getting really bad.     Wife and patient are really hoping Dr. Gee can see him today.     Will route to provider to advise.     SHAISTA DanielleN, RN  [facility]

## 2018-11-02 ENCOUNTER — OFFICE VISIT (OUTPATIENT)
Dept: FAMILY MEDICINE | Facility: OTHER | Age: 83
End: 2018-11-02
Payer: COMMERCIAL

## 2018-11-02 VITALS
WEIGHT: 243 LBS | OXYGEN SATURATION: 94 % | TEMPERATURE: 96.9 F | DIASTOLIC BLOOD PRESSURE: 80 MMHG | RESPIRATION RATE: 18 BRPM | BODY MASS INDEX: 32.06 KG/M2 | HEART RATE: 82 BPM | SYSTOLIC BLOOD PRESSURE: 116 MMHG

## 2018-11-02 DIAGNOSIS — I10 HYPERTENSION GOAL BP (BLOOD PRESSURE) < 140/90: ICD-10-CM

## 2018-11-02 DIAGNOSIS — I48.20 CHRONIC ATRIAL FIBRILLATION (H): ICD-10-CM

## 2018-11-02 DIAGNOSIS — L02.212 CUTANEOUS ABSCESS OF BACK EXCLUDING BUTTOCKS: ICD-10-CM

## 2018-11-02 DIAGNOSIS — Z79.01 LONG TERM CURRENT USE OF ANTICOAGULANT THERAPY: Primary | ICD-10-CM

## 2018-11-02 PROCEDURE — 99214 OFFICE O/P EST MOD 30 MIN: CPT | Performed by: INTERNAL MEDICINE

## 2018-11-02 RX ORDER — AMOXICILLIN AND CLAVULANATE POTASSIUM 500; 125 MG/1; MG/1
1 TABLET, FILM COATED ORAL 2 TIMES DAILY
Qty: 10 TABLET | Refills: 0 | Status: SHIPPED | OUTPATIENT
Start: 2018-11-02 | End: 2018-11-21

## 2018-11-02 ASSESSMENT — PAIN SCALES - GENERAL: PAINLEVEL: NO PAIN (0)

## 2018-11-02 NOTE — MR AVS SNAPSHOT
After Visit Summary   11/2/2018    Kevin Partida    MRN: [id number]           Patient Information     Date Of Birth          1935        Visit Information        Provider Department      11/2/2018 11:00 AM Chad Gee DO [facility]        Today's Diagnoses     Long term current use of anticoagulant therapy    -  1    Cutaneous abscess of back excluding buttocks        Hypertension goal BP (blood pressure) < 140/90        Chronic atrial fibrillation (H)           Follow-ups after your visit        Follow-up notes from your care team     Return in about 3 months (around 2/2/2019) for follow up.      Your next 10 appointments already scheduled     Nov 29, 2018  9:30 AM CST   Anticoagulation Visit with  ANTI COAG   [facility]    [address] [facility] [address] [phone]            Dec 12, 2018  8:40 AM CST   Office Visit with Chad Gee DO   [facility]    [address] [facility] [address] [phone]           Bring a current list of meds and any records pertaining to this visit. For Physicals, please bring immunization records and any forms needing to be filled out. Please arrive 10 minutes early to complete paperwork.              Who to contact     If you have questions or need follow up information about today's clinic visit or your schedule please contact [facility] directly at [phone].  Normal or non-critical lab and imaging results will be communicated to you by MyChart, letter or phone within 4 business days after the clinic has received the results. If you do not hear from us within 7 days, please contact the clinic through MyChart or phone. If you have a critical or abnormal lab result, we will notify you by phone as soon as possible.  Submit refill requests through [employer] or call your pharmacy and they will forward the refill  request to us. Please allow 3 business days for your refill to be completed.          Additional Information About Your Visit        MyChart Information     [employer] gives you secure access to your electronic health record. If you see a primary care provider, you can also send messages to your care team and make appointments. If you have questions, please call your primary care clinic.  If you do not have a primary care provider, please call [phone] and they will assist you.        Care EveryWhere ID     This is your Care EveryWhere ID. This could be used by other organizations to access your [facility] medical records  [id number]        Your Vitals Were     Pulse Temperature Respirations Pulse Oximetry BMI (Body Mass Index)       82 96.9  F (36.1  C) (Tympanic) 18 94% 32.06 kg/m2        Blood Pressure from Last 3 Encounters:   11/21/18 110/60   11/02/18 116/80   03/28/18 106/78    Weight from Last 3 Encounters:   11/21/18 242 lb (109.8 kg)   11/02/18 243 lb (110.2 kg)   03/28/18 235 lb (106.6 kg)              Today, you had the following     No orders found for display         Where to get your medicines      These medications were sent to [facility] [address]   [address] [facility] [address]     Phone:  [phone]     amoxicillin-clavulanate 500-125 MG tablet          Primary Care Provider Office Phone # Fax #    Chad Librado GeeDO [phone]       [address] [place] [address]        Equal Access to Services     ANTHONY LENZ AH: Hadii aad ku hadasho Soomaali, waaxda luqadaha, qaybta kaalmada adeegyada, alia [facility] macdonald. So [facility] [phone].    ATENCIÓN: Si habla español, tiene a owen disposición servicios gratuitos de asistencia lingüística. Llame al [phone].    We comply with applicable federal civil rights laws and [place] laws. We do not discriminate on the basis of race, color, national origin, age,  disability, sex, sexual orientation, or gender identity.            Thank you!     Thank you for choosing [facility]  for your care. Our goal is always to provide you with excellent care. Hearing back from our patients is one way we can continue to improve our services. Please take a few minutes to complete the written survey that you may receive in the mail after your visit with us. Thank you!             Your Updated Medication List - Protect others around you: Learn how to safely use, store and throw away your medicines at www.disposemymeds.org.          This list is accurate as of 11/2/18 11:59 PM.  Always use your most recent med list.                   Brand Name Dispense Instructions for use Diagnosis    amoxicillin-clavulanate 500-125 MG tablet    AUGMENTIN    10 tablet    Take 1 tablet by mouth 2 times daily    Cutaneous abscess of back excluding buttocks       aspirin 81 MG tablet    ASA     Take 1 tablet by mouth daily.        carvedilol 12.5 MG tablet    COREG    180 tablet    Take 1 tablet (12.5 mg) by mouth 2 times daily (with meals)    Coronary artery disease involving native coronary artery without angina pectoris       furosemide 20 MG tablet    LASIX    90 tablet    Take 1 tablet (20 mg) by mouth daily    Ischemic cardiomyopathy       JANTOVEN 5 MG tablet   Generic drug:  warfarin     105 tablet    TAKE ONE AND ONE-HALF TABLETS BY MOUTH ON TUESDAY AND SATURDAY AND ONE TABLET ALL OTHER DAYS OF THE WEEK OR AS DIRECTED BY COUMADIN CLINIC    Chronic atrial fibrillation (H)       lisinopril 10 MG tablet    PRINIVIL/ZESTRIL    90 tablet    Take 1 tablet (10 mg) by mouth daily    Coronary artery disease involving native coronary artery without angina pectoris

## 2018-11-02 NOTE — NURSING NOTE
"Chief Complaint   Patient presents with     RECHECK     recheck abscess        Initial /80  Pulse 82  Temp 96.9  F (36.1  C) (Tympanic)  Resp 18  Wt 243 lb (110.2 kg)  SpO2 94%  BMI 32.06 kg/m2 Estimated body mass index is 32.06 kg/(m^2) as calculated from the following:    Height as of 3/28/18: 6' 1\" (1.854 m).    Weight as of this encounter: 243 lb (110.2 kg).  BP completed using cuff size: ellen Chin MA      "

## 2018-11-02 NOTE — PROGRESS NOTES
SUBJECTIVE:   Kevin Partida is a 83 year old male who presents to clinic today for the following health issues:      Recheck Abscess                   Chief Complaint         The patient is a pleasant 83-year-old gentleman who presents today with an abscess in his mid back.  He was recently started on Augmentin over the telephone for what appeared to be a sebaceous cyst that became infected.  In fact, he had a sebaceous cyst that became infected.  It is in his upper mid back and measures approximately 3 cm in diameter.  He has had a small bump on his back for a long time but it became red and inflamed recently.  It is already doing much better with use of the Augmentin.  It did spontaneously rupture and they have been applying appropriate dressings.  Denies any fevers or chills at this time.  The patient is on chronic anticoagulation for atrial fibrillation but has not had excessive bleeding from the abscess site.                       PAST, FAMILY,SOCIAL HISTORY:     Medical  History:   has a past medical history of Atrial fibrillation (H) (04/14/07); Atrial flutter (H); Coronary artery disease; Headache(784.0); Hepatitis, unspecified; Hyperlipidaemia; Hypertension; Measles without mention of complication; Mumps without mention of complication; Orchitis and epididymitis, unspecified; Other and unspecified hyperlipidemia; Other emphysema (H); Other speech disturbance; Pneumonia, organism unspecified(486); Shortness of breath; Urinary obstruction; and Varicella without mention of complication.     Surgical History:   has a past surgical history that includes LAPAROSCOPY, SURGICAL; CHOLECYSTECTOMY (1998); REMOVAL OF TONSILS,<13 Y/O; Colonoscopy w/wo [place] **Performed** (05/08/06); laminect/discectomy, lumbar (03/26/08); and Colonoscopy (9/13/2011).     Social History:   reports that he has quit smoking. His smoking use included Cigarettes. He smoked 0.50 packs per day. He has never used smokeless tobacco. He  reports that he drinks alcohol. He reports that he does not use illicit drugs.     Family History:  family history includes Alzheimer Disease in his mother; Cancer in his father; Cerebrovascular Disease in his mother and paternal uncle; Diabetes in his maternal aunt, mother, and another family member; EYE* in his mother; Genetic Disorder in his maternal grandmother; Heart Disease in his paternal uncle; Neurologic Disorder in his paternal uncle.            MEDICATIONS  Current Outpatient Prescriptions   Medication Sig Dispense Refill     aspirin 81 MG tablet Take 1 tablet by mouth daily.       carvedilol (COREG) 12.5 MG tablet Take 1 tablet (12.5 mg) by mouth 2 times daily (with meals) 180 tablet 3     furosemide (LASIX) 20 MG tablet Take 1 tablet (20 mg) by mouth daily 90 tablet 3     JANTOVEN 5 MG tablet TAKE ONE AND ONE-HALF TABLETS BY MOUTH ON TUESDAY AND SATURDAY AND ONE TABLET ALL OTHER DAYS OF THE WEEK OR AS DIRECTED BY COUMADIN CLINIC 105 tablet 0     lisinopril (PRINIVIL/ZESTRIL) 10 MG tablet Take 1 tablet (10 mg) by mouth daily 90 tablet 3     ciprofloxacin (CIPRO) 250 MG tablet Take 1 tablet (250 mg) by mouth 2 times daily 6 tablet 0     simvastatin (ZOCOR) 80 MG tablet Take 1 tablet (80 mg) by mouth daily 90 tablet 1         --------------------------------------------------------------------------------------------------------------------                              Review of Systems       LUNGS: Pt denies: cough, excess sputum, hemoptysis, or shortness of breath.   HEART: Pt denies: chest pain, sensed arrhythmia, syncope, tachy or bradyarrhythmia.   GI: Pt denies: nausea, vomiting, diarrhea, constipation, melena, or hematochezia.   NEURO: Pt denies: seizures, strokes, diplopia, weakness, paraesthesias, or paralysis.   SKIN: Pt denies: itching, rashes, discoloration, or additional lesions of concern. Denies recent hair loss.  Does have a large abscess on his back as described.   PSYCH: The patient  denies significant depression, anxiety, mood imbalance. Specifically denies any suicidal ideation.                                     Examination      /80  Pulse 82  Temp 96.9  F (36.1  C) (Tympanic)  Resp 18  Wt 243 lb (110.2 kg)  SpO2 94%  BMI 32.06 kg/m2   Constitutional: The patient appears to be in no acute distress. The patient appears to be adequately hydrated. No acute respiratory or hemodynamic distress is noted at this time.   LUNGS: clear bilaterally, airflow is brisk, no intercostal retraction or stridor is noted. No coughing is noted during visit.   HEART: Irregularly irregular without rubs, clicks, gallops, or murmurs. PMI is nondisplaced. Upstrokes are brisk. S1,S2 are heard.   GI: Abdomen is soft, without rebound, guarding or tenderness. Bowel sounds are appropriate. No renal bruits are heard.   NEURO: Pt is alert and appropriate. No neurologic lateralization is noted. Cranial nerves 2-12 are intact. Peripheral sensory and motor function are grossly normal.    SKIN:  warm and dry.  The abscess in the middle upper back is as described.                                             Decision Making    1. Cutaneous abscess of back excluding buttocks  Continue Augmentin for an additional 5 days    2. Long term current use of anticoagulant therapy  Follow INR closely    3. Hypertension goal BP (blood pressure) < 140/90  Blood pressure currently controlled, continue medication    4. Chronic atrial fibrillation (H)  Continue anticoagulation and rate control as needed                         FOLLOW UP   I have asked the patient to make an appointment for followup with me in 3 months or sooner if abscess not completely resolved.      Approximately 30 minutes were spent with the patient in direct face-to-face discussion regarding diagnosis and treatment options pertinent to current problems.          I have carefully explained the diagnosis and treatment options to the patient.  The patient has  displayed an understanding of the above, and all subsequent questions were answered.      DO TETE Fierro    Portions of this note were produced using [employer]  Although every attempt at real-time proof reading has been made, occasional grammar/syntax errors may have been missed.

## 2018-11-12 DIAGNOSIS — E78.5 HYPERLIPIDEMIA LDL GOAL <70: ICD-10-CM

## 2018-11-12 RX ORDER — SIMVASTATIN 80 MG
80 TABLET ORAL DAILY
Qty: 90 TABLET | Refills: 1 | Status: SHIPPED | OUTPATIENT
Start: 2018-11-12 | End: 2019-03-27

## 2018-11-21 ENCOUNTER — OFFICE VISIT (OUTPATIENT)
Dept: FAMILY MEDICINE | Facility: OTHER | Age: 83
End: 2018-11-21
Payer: COMMERCIAL

## 2018-11-21 VITALS
HEART RATE: 83 BPM | DIASTOLIC BLOOD PRESSURE: 60 MMHG | RESPIRATION RATE: 20 BRPM | TEMPERATURE: 98.2 F | OXYGEN SATURATION: 94 % | SYSTOLIC BLOOD PRESSURE: 110 MMHG | WEIGHT: 242 LBS | BODY MASS INDEX: 31.93 KG/M2

## 2018-11-21 DIAGNOSIS — R31.9 HEMATURIA, UNSPECIFIED TYPE: ICD-10-CM

## 2018-11-21 DIAGNOSIS — N39.41 URGENCY INCONTINENCE: ICD-10-CM

## 2018-11-21 DIAGNOSIS — R30.0 DYSURIA: ICD-10-CM

## 2018-11-21 DIAGNOSIS — N39.0 URINARY TRACT INFECTION WITH HEMATURIA, SITE UNSPECIFIED: Primary | ICD-10-CM

## 2018-11-21 DIAGNOSIS — R31.9 URINARY TRACT INFECTION WITH HEMATURIA, SITE UNSPECIFIED: Primary | ICD-10-CM

## 2018-11-21 LAB

## 2018-11-21 PROCEDURE — 99213 OFFICE O/P EST LOW 20 MIN: CPT | Performed by: NURSE PRACTITIONER

## 2018-11-21 PROCEDURE — 81001 URINALYSIS AUTO W/SCOPE: CPT | Performed by: NURSE PRACTITIONER

## 2018-11-21 RX ORDER — CIPROFLOXACIN 250 MG/1
250 TABLET, FILM COATED ORAL 2 TIMES DAILY
Qty: 6 TABLET | Refills: 0 | Status: SHIPPED | OUTPATIENT
Start: 2018-11-21 | End: 2018-12-12

## 2018-11-21 NOTE — PROGRESS NOTES
SUBJECTIVE:   Kevin Partida is a 83 year old male who presents to clinic today for the following health issues:      Genitourinary symptoms      Duration: 2 days    Description:  dysuria, frequency, hematuria and incontinence    Intensity:  severe    Accompanying signs and symptoms (fever/discharge/nausea/vomiting/back or abdominal pain):  Lots of blood in urine.    History (frequent UTI's/kidney stones/prostate problems):   Sexually active: no     Precipitating or alleviating factors: None    Therapies tried and outcome: none     Is on Jantoven.  No previous hematuria.   Reports what sounds like a previous UTI in the past as well as a prostate procedure in the past.  He can't recall when this was.  Complaining of dysuria currently.  Denies penile or scrotal pain, nausea vomiting or abdominal pain.  No fevers.        Problem list and histories reviewed & adjusted, as indicated.  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     Cardiovascular disease     Tobacco use disorder     Primary cardiomyopathy (H)     HYPERLIPIDEMIA LDL GOAL <100     Hypertension goal BP (blood pressure) < 140/90     Advance Care Planning     CAD (coronary artery disease)     Long-term (current) use of anticoagulants [Z79.01]     Chronic atrial fibrillation (H)     Past Surgical History:   Procedure Laterality Date     COLONOSCOPY  9/13/2011    Procedure:COLONOSCOPY; colonoscopy; Surgeon:IVETH WIGGINS; Location: GI     HC COLONOSCOPY W/WO BRUSH/WASH  05/08/06     HC LAPAROSCOPY, SURGICAL; CHOLECYSTECTOMY  1998    Cholecystectomy, Laparoscopic     HC REMOVAL OF TONSILS,<13 Y/O      Tonsils <12y.o.     LAMINECT/DISCECTOMY, LUMBAR  03/26/08    Right L4-L5 microlumbar diskectomy.       Social History   Substance Use Topics     Smoking status: Former Smoker     Packs/day: 0.50     Types: Cigarettes     Smokeless tobacco: Never Used      Comment: smoked for 57  years- since age 9     Alcohol use 0.0 oz/week     0 Standard drinks or equivalent per week      Comment: socially     Family History   Problem Relation Age of Onset     Alzheimer Disease Mother      ?dimentia or alzheimers     Diabetes Mother      insulin     EYE* Mother      cataract     Cerebrovascular Disease Mother      Cancer Father      lymph tumor of glands     Diabetes Maternal Aunt      ? oral or insulin     Diabetes Other      4 cousins insulin dependent     Genetic Disorder Maternal Grandmother      tremors     HEART DISEASE Paternal Uncle      ? at age 60-70     Neurologic Disorder Paternal Uncle      parkinsons     Cerebrovascular Disease Paternal Uncle      ? dued at age 60-70         Current Outpatient Prescriptions   Medication Sig Dispense Refill     aspirin 81 MG tablet Take 1 tablet by mouth daily.       carvedilol (COREG) 12.5 MG tablet Take 1 tablet (12.5 mg) by mouth 2 times daily (with meals) 180 tablet 3     furosemide (LASIX) 20 MG tablet Take 1 tablet (20 mg) by mouth daily 90 tablet 3     JANTOVEN 5 MG tablet TAKE ONE AND ONE-HALF TABLETS BY MOUTH ON TUESDAY AND SATURDAY AND ONE TABLET ALL OTHER DAYS OF THE WEEK OR AS DIRECTED BY COUMADIN CLINIC 105 tablet 0     lisinopril (PRINIVIL/ZESTRIL) 10 MG tablet Take 1 tablet (10 mg) by mouth daily 90 tablet 3     simvastatin (ZOCOR) 80 MG tablet Take 1 tablet (80 mg) by mouth daily 90 tablet 1     Allergies   Allergen Reactions     Codeine GI Disturbance     Niacin Hives     got very red and flushed.  Doesn't want     BP Readings from Last 3 Encounters:   18 110/60   18 116/80   18 106/78    Wt Readings from Last 3 Encounters:   18 242 lb (109.8 kg)   18 243 lb (110.2 kg)   18 235 lb (106.6 kg)                    Reviewed and updated as needed this visit by clinical staff  Tobacco  Allergies  Meds  Med Hx  Surg Hx  Fam Hx  Soc Hx      Reviewed and updated as needed this visit by Provider          ROS:  Constitutional, HEENT, cardiovascular, pulmonary, GI, , musculoskeletal, neuro, skin, endocrine and psych systems are negative, except as otherwise noted.    OBJECTIVE:     /60  Pulse 83  Temp 98.2  F (36.8  C) (Tympanic)  Resp 20  Wt 242 lb (109.8 kg)  SpO2 94%  BMI 31.93 kg/m2  Body mass index is 31.93 kg/(m^2).  GENERAL: alert, no distress and obese  NECK: no adenopathy, no asymmetry, masses, or scars and thyroid normal to palpation  RESP: lungs clear to auscultation - no rales, rhonchi or wheezes  CV: regular rate and rhythm, normal S1 S2, no S3 or S4, no murmur, click or rub, no peripheral edema and peripheral pulses strong  ABDOMEN: soft, nontender, no hepatosplenomegaly, no masses and bowel sounds normal  MS: no gross musculoskeletal defects noted, no edema    Diagnostic Test Results:  Results for orders placed or performed in visit on 11/21/18 (from the past 24 hour(s))   *UA reflex to Microscopic and Culture ([place] and [facility] (except [place] and [place])   Result Value Ref Range    Color Urine Yellow     Appearance Urine Clear     Glucose Urine Negative NEG^Negative mg/dL    Bilirubin Urine Negative NEG^Negative    Ketones Urine Negative NEG^Negative mg/dL    Specific Gravity Urine 1.010 1.003 - 1.035    Blood Urine Moderate (A) NEG^Negative    pH Urine 5.5 5.0 - 7.0 pH    Protein Albumin Urine Negative NEG^Negative mg/dL    Urobilinogen Urine 0.2 0.2 - 1.0 EU/dL    Nitrite Urine Negative NEG^Negative    Leukocyte Esterase Urine Trace (A) NEG^Negative    Source Unspecified Urine    Urine Microscopic   Result Value Ref Range    WBC Urine 0 - 5 OTO5^0 - 5 /HPF    RBC Urine 10-25 (A) OTO2^O - 2 /HPF    Squamous Epithelial /LPF Urine Few FEW^Few /LPF    Bacteria Urine Few (A) NEG^Negative /HPF       ASSESSMENT/PLAN:         1. Urinary tract infection with hematuria, site unspecified  His urine did show some bacteria and his symptoms are consistent with acute cystitis, so I  am going to treat him with Cipro as prescribed.  However, I did advise he have a follow up visit with his PCP in 1 month to recheck hematuria and symptoms.   - ciprofloxacin (CIPRO) 250 MG tablet; Take 1 tablet (250 mg) by mouth 2 times daily  Dispense: 6 tablet; Refill: 0    2. Dysuria  - *UA reflex to Microscopic and Culture ([place] and [facility] (except [place] and [place])  - Urine Microscopic    3. Blood in urine  - *UA reflex to Microscopic and Culture ([place] and [facility] (except [place] and [place])    4. Urgency incontinence  - *UA reflex to Microscopic and Culture ([place] and [facility] (except [place] and [place])    See Patient Instructions    MARIELY Goodman [facility]

## 2018-11-21 NOTE — PATIENT INSTRUCTIONS
Take the Cipro twice a day for 3 days.  This is for infection.     Call the INR clinic and let her know you are on an antibiotic. You may need your INR done sooner.     Follow up with Dr. Gee in 1 month for a recheck of your urine.

## 2018-11-21 NOTE — MR AVS SNAPSHOT
After Visit Summary   11/21/2018    Kevin Partida    MRN: [id number]           Patient Information     Date Of Birth          1935        Visit Information        Provider Department      11/21/2018 10:20 AM Sabra Spencer APRN CNP [facility]        Today's Diagnoses     Dysuria    -  1    Blood in urine        Urgency incontinence        Urinary tract infection with hematuria, site unspecified          Care Instructions    Take the Cipro twice a day for 3 days.  This is for infection.     Call the INR clinic and let her know you are on an antibiotic. You may need your INR done sooner.     Follow up with Dr. Borrego in 1 month for a recheck of your urine.               Follow-ups after your visit        Follow-up notes from your care team     Return in about 1 month (around 12/21/2018) for Follow up, With Primary Care Provider.      Your next 10 appointments already scheduled     Nov 29, 2018  9:30 AM CST   [facility] with  [facility]   [facility]    [address] [facility] [address] [phone]              Who to contact     If you have questions or need follow up information about today's clinic visit or your schedule please contact [facility] directly at [phone].  Normal or non-critical lab and imaging results will be communicated to you by MyChart, letter or phone within 4 business days after the clinic has received the results. If you do not hear from us within 7 days, please contact the clinic through [employer] or phone. If you have a critical or abnormal lab result, we will notify you by phone as soon as possible.  Submit refill requests through [employer] or call your pharmacy and they will forward the refill request to us. Please allow 3 business days for your refill to be completed.          Additional Information About Your Visit        [employer] Information     [employer] gives you secure access to  your electronic health record. If you see a primary care provider, you can also send messages to your care team and make appointments. If you have questions, please call your primary care clinic.  If you do not have a primary care provider, please call [phone] and they will assist you.        Care EveryWhere ID     This is your Care EveryWhere ID. This could be used by other organizations to access your [facility] medical records  [id number]        Your Vitals Were     Pulse Temperature Respirations Pulse Oximetry BMI (Body Mass Index)       83 98.2  F (36.8  C) (Tympanic) 20 94% 31.93 kg/m2        Blood Pressure from Last 3 Encounters:   11/21/18 110/60   11/02/18 116/80   03/28/18 106/78    Weight from Last 3 Encounters:   11/21/18 242 lb (109.8 kg)   11/02/18 243 lb (110.2 kg)   03/28/18 235 lb (106.6 kg)              We Performed the Following     *UA reflex to Microscopic and Culture ([place] and [facility] (except [place] and [place])     Urine Microscopic          Today's Medication Changes          These changes are accurate as of 11/21/18 11:25 AM.  If you have any questions, ask your nurse or doctor.               Start taking these medicines.        Dose/Directions    ciprofloxacin 250 MG tablet   Commonly known as:  CIPRO   Used for:  Urinary tract infection with hematuria, site unspecified   Started by:  Sabra Spencer APRN CNP        Dose:  250 mg   Take 1 tablet (250 mg) by mouth 2 times daily   Quantity:  6 tablet   Refills:  0            Where to get your medicines      These medications were sent to [facility] [address]   [address] [facility] [address]     Phone:  [phone]     ciprofloxacin 250 MG tablet                Primary Care Provider Office Phone # Fax #    Chad Librado Gee [phone]        [facility] [address] [place] [address]        Equal Access to Services     ANTHONY LENZ AH: Becky Estrada,  [place] jarrellanthony, qaybta kaevin harris, alia ferguson joseluz elena laKerriaashahla ah. So [facility] [phone].    ATENCIÓN: Si jennifer coto, tiene a owen disposición servicios gratuitos de asistencia lingüística. Bryan al [phone].    We comply with applicable federal civil rights laws and [place] laws. We do not discriminate on the basis of race, color, national origin, age, disability, sex, sexual orientation, or gender identity.            Thank you!     Thank you for choosing [facility]  for your care. Our goal is always to provide you with excellent care. Hearing back from our patients is one way we can continue to improve our services. Please take a few minutes to complete the written survey that you may receive in the mail after your visit with us. Thank you!             Your Updated Medication List - Protect others around you: Learn how to safely use, store and throw away your medicines at www.disposemymeds.org.          This list is accurate as of 11/21/18 11:25 AM.  Always use your most recent med list.                   Brand Name Dispense Instructions for use Diagnosis    aspirin 81 MG tablet      Take 1 tablet by mouth daily.        carvedilol 12.5 MG tablet    COREG    180 tablet    Take 1 tablet (12.5 mg) by mouth 2 times daily (with meals)    Coronary artery disease involving native coronary artery without angina pectoris       ciprofloxacin 250 MG tablet    CIPRO    6 tablet    Take 1 tablet (250 mg) by mouth 2 times daily    Urinary tract infection with hematuria, site unspecified       furosemide 20 MG tablet    LASIX    90 tablet    Take 1 tablet (20 mg) by mouth daily    Ischemic cardiomyopathy       JANTOVEN 5 MG tablet   Generic drug:  warfarin     105 tablet    TAKE ONE AND ONE-HALF TABLETS BY MOUTH ON TUESDAY AND SATURDAY AND ONE TABLET ALL OTHER DAYS OF THE WEEK OR AS DIRECTED BY COUMADIN CLINIC    Chronic atrial fibrillation (H)       lisinopril 10 MG tablet     PRINIVIL/ZESTRIL    90 tablet    Take 1 tablet (10 mg) by mouth daily    Coronary artery disease involving native coronary artery without angina pectoris       simvastatin 80 MG tablet    ZOCOR    90 tablet    Take 1 tablet (80 mg) by mouth daily    Hyperlipidemia LDL goal <70

## 2018-11-29 ENCOUNTER — ANTICOAGULATION THERAPY VISIT (OUTPATIENT)
Dept: ANTICOAGULATION | Facility: OTHER | Age: 83
End: 2018-11-29
Payer: COMMERCIAL

## 2018-11-29 DIAGNOSIS — Z79.01 LONG TERM CURRENT USE OF ANTICOAGULANT THERAPY: ICD-10-CM

## 2018-11-29 LAB — INR POINT OF CARE: 2.3 (ref 0.86–1.14)

## 2018-11-29 PROCEDURE — 85610 PROTHROMBIN TIME: CPT | Mod: QW

## 2018-11-29 PROCEDURE — 99207 ZZC NO CHARGE NURSE ONLY: CPT

## 2018-11-29 PROCEDURE — 36416 COLLJ CAPILLARY BLOOD SPEC: CPT

## 2018-11-29 NOTE — MR AVS SNAPSHOT
Kevin Partida   11/29/2018 9:30 AM   Anticoagulation Therapy Visit    Description:  83 year old male   Provider:  ARNIE ANTI COAG   Department:  [facility]           INR as of 11/29/2018     Today's INR 2.3      Anticoagulation Summary as of 11/29/2018     INR goal 2.0-3.0   Today's INR 2.3   Full warfarin instructions 7.5 mg on Sat; 5 mg all other days   Next INR check 1/10/2019    Indications   Long term current use of anticoagulant therapy [Z79.01]         Your next Anticoagulation Clinic appointment(s)     Prasanna 10, 2019  9:30 AM CST   Anticoagulation Visit with ARNIE ANTI VINCE   [facility] ([facility])    [address] [facility] [address] [phone]              Contact Numbers     Clinic Number:         November 2018 Details    Sun Mon Tue Wed Thu Fri Sat         1               2               3                 4               5               6               7               8               9               10                 11               12               13               14               15               16               17                 18               19               20               21               22               23               24                 25               26               27               28               29      5 mg   See details      30      5 mg           Date Details   11/29 This INR check               How to take your warfarin dose     To take:  5 mg Take 1 of the 5 mg tablets.           December 2018 Details    Sun Mon Tue Wed Thu Fri Sat           1      7.5 mg           2      5 mg         3      5 mg         4      5 mg         5      5 mg         6      5 mg         7      5 mg         8      7.5 mg           9      5 mg         10      5 mg         11      5 mg         12      5 mg         13      5 mg         14      5 mg         15      7.5 mg           16      5 mg         17      5 mg         18      5 mg         19      5 mg         20       5 mg         21      5 mg         22      7.5 mg           23      5 mg         24      5 mg         25      5 mg         26      5 mg         27      5 mg         28      5 mg         29      7.5 mg           30      5 mg         31      5 mg               Date Details   No additional details            How to take your warfarin dose     To take:  5 mg Take 1 of the 5 mg tablets.    To take:  7.5 mg Take 1.5 of the 5 mg tablets.           January 2019 Details    Sun Mon Tue Wed Thu Fri Sat       1      5 mg         2      5 mg         3      5 mg         4      5 mg         5      7.5 mg           6      5 mg         7      5 mg         8      5 mg         9      5 mg         10            11               12                 13               14               15               16               17               18               19                 20               21               22               23               24               25               26                 27               28               29               30               31                  Date Details   No additional details    Date of next INR:  1/10/2019         How to take your warfarin dose     To take:  5 mg Take 1 of the 5 mg tablets.    To take:  7.5 mg Take 1.5 of the 5 mg tablets.

## 2018-11-29 NOTE — PROGRESS NOTES
ANTICOAGULATION FOLLOW-UP CLINIC VISIT    Patient Name:  Kevin Partida  Date:  11/29/2018  Contact Type:  Face to Face    SUBJECTIVE:     Patient Findings     Positives No Problem Findings           OBJECTIVE    INR Protime   Date Value Ref Range Status   11/29/2018 2.3 (A) 0.86 - 1.14 Final       ASSESSMENT / PLAN  INR assessment THER    Recheck INR In: 6 WEEKS    INR Location Clinic      Anticoagulation Summary as of 11/29/2018     INR goal 2.0-3.0   Today's INR 2.3   Warfarin maintenance plan 7.5 mg (5 mg x 1.5) on Sat; 5 mg (5 mg x 1) all other days   Full warfarin instructions 7.5 mg on Sat; 5 mg all other days   Weekly warfarin total 37.5 mg   No change documented Diego Almanzar RN   Plan last modified Diego Almanzar RN (9/20/2018)   Next INR check 1/10/2019   Target end date     Indications   Long term current use of anticoagulant therapy [Z79.01]         Anticoagulation Episode Summary     INR check location     Preferred lab     Send INR reminders to [facility]    Comments 5 mg tabs, PM dose, print out, BP      Anticoagulation Care Providers     Provider Role Specialty Phone number    Taqueria Borrego MD [facility] [phone]            See the Encounter Report to view Anticoagulation Flowsheet and Dosing Calendar (Go to Encounters tab in chart review, and find the Anticoagulation Therapy Visit)    Dosage adjustment made based on physician directed care plan.    Diego Almanzar RN

## 2018-12-12 ENCOUNTER — OFFICE VISIT (OUTPATIENT)
Dept: FAMILY MEDICINE | Facility: OTHER | Age: 83
End: 2018-12-12
Payer: COMMERCIAL

## 2018-12-12 VITALS
DIASTOLIC BLOOD PRESSURE: 60 MMHG | TEMPERATURE: 97.8 F | SYSTOLIC BLOOD PRESSURE: 102 MMHG | RESPIRATION RATE: 18 BRPM | OXYGEN SATURATION: 94 % | BODY MASS INDEX: 31.8 KG/M2 | HEART RATE: 72 BPM | WEIGHT: 241 LBS

## 2018-12-12 DIAGNOSIS — Z79.01 LONG TERM CURRENT USE OF ANTICOAGULANT THERAPY: ICD-10-CM

## 2018-12-12 DIAGNOSIS — N39.0 URINARY TRACT INFECTION WITH HEMATURIA, SITE UNSPECIFIED: ICD-10-CM

## 2018-12-12 DIAGNOSIS — I48.20 CHRONIC ATRIAL FIBRILLATION (H): ICD-10-CM

## 2018-12-12 DIAGNOSIS — N41.0 ACUTE PROSTATITIS: ICD-10-CM

## 2018-12-12 DIAGNOSIS — R31.9 URINARY TRACT INFECTION WITH HEMATURIA, SITE UNSPECIFIED: ICD-10-CM

## 2018-12-12 DIAGNOSIS — Z12.5 SCREENING FOR PROSTATE CANCER: Primary | ICD-10-CM

## 2018-12-12 LAB
ALBUMIN UR-MCNC: NEGATIVE MG/DL
ANION GAP SERPL CALCULATED.3IONS-SCNC: 6 MMOL/L (ref 3–14)
APPEARANCE UR: CLEAR
BILIRUB UR QL STRIP: NEGATIVE
BUN SERPL-MCNC: 24 MG/DL (ref 7–30)
CALCIUM SERPL-MCNC: 8.7 MG/DL (ref 8.5–10.1)
CHLORIDE SERPL-SCNC: 105 MMOL/L (ref 94–109)
CO2 SERPL-SCNC: 28 MMOL/L (ref 20–32)
COLOR UR AUTO: YELLOW
CREAT SERPL-MCNC: 1.09 MG/DL (ref 0.66–1.25)
ERYTHROCYTE [DISTWIDTH] IN BLOOD BY AUTOMATED COUNT: 13.7 % (ref 10–15)
GFR SERPL CREATININE-BSD FRML MDRD: 64 ML/MIN/1.7M2
GLUCOSE SERPL-MCNC: 121 MG/DL (ref 70–99)
GLUCOSE UR STRIP-MCNC: NEGATIVE MG/DL
HCT VFR BLD AUTO: 49.7 % (ref 40–53)
HGB BLD-MCNC: 16.1 G/DL (ref 13.3–17.7)
HGB UR QL STRIP: ABNORMAL
KETONES UR STRIP-MCNC: NEGATIVE MG/DL
LEUKOCYTE ESTERASE UR QL STRIP: NEGATIVE
MCH RBC QN AUTO: 31.1 PG (ref 26.5–33)
MCHC RBC AUTO-ENTMCNC: 32.4 G/DL (ref 31.5–36.5)
MCV RBC AUTO: 96 FL (ref 78–100)
NITRATE UR QL: NEGATIVE
PH UR STRIP: 5 PH (ref 5–7)
PLATELET # BLD AUTO: 174 10E9/L (ref 150–450)
POTASSIUM SERPL-SCNC: 4.7 MMOL/L (ref 3.4–5.3)
PSA SERPL-ACNC: 1.05 UG/L (ref 0–4)
RBC # BLD AUTO: 5.18 10E12/L (ref 4.4–5.9)
RBC #/AREA URNS AUTO: NORMAL /HPF
SODIUM SERPL-SCNC: 139 MMOL/L (ref 133–144)
SOURCE: ABNORMAL
SP GR UR STRIP: 1.01 (ref 1–1.03)
UROBILINOGEN UR STRIP-ACNC: 0.2 EU/DL (ref 0.2–1)
WBC # BLD AUTO: 7.9 10E9/L (ref 4–11)
WBC #/AREA URNS AUTO: NORMAL /HPF

## 2018-12-12 PROCEDURE — G0103 PSA SCREENING: HCPCS | Performed by: INTERNAL MEDICINE

## 2018-12-12 PROCEDURE — 99214 OFFICE O/P EST MOD 30 MIN: CPT | Performed by: INTERNAL MEDICINE

## 2018-12-12 PROCEDURE — 80048 BASIC METABOLIC PNL TOTAL CA: CPT | Performed by: INTERNAL MEDICINE

## 2018-12-12 PROCEDURE — 85027 COMPLETE CBC AUTOMATED: CPT | Performed by: INTERNAL MEDICINE

## 2018-12-12 PROCEDURE — 36415 COLL VENOUS BLD VENIPUNCTURE: CPT | Performed by: INTERNAL MEDICINE

## 2018-12-12 PROCEDURE — 81001 URINALYSIS AUTO W/SCOPE: CPT | Performed by: INTERNAL MEDICINE

## 2018-12-12 RX ORDER — SULFAMETHOXAZOLE/TRIMETHOPRIM 800-160 MG
1 TABLET ORAL 2 TIMES DAILY
Qty: 60 TABLET | Refills: 0 | Status: SHIPPED | OUTPATIENT
Start: 2018-12-12 | End: 2019-01-02

## 2018-12-12 ASSESSMENT — PAIN SCALES - GENERAL: PAINLEVEL: NO PAIN (0)

## 2018-12-12 NOTE — PROGRESS NOTES
"  SUBJECTIVE:   Kevin Partida is a 83 year old male who presents to clinic today for the following health issues:      Chief Complaint   Patient presents with     Follow Up     UTI, still having symptoms completed antibiotic                         Chief Complaint         The patient is a pleasant 83-year-old gentleman who recently had some hematuria.  He was believed to have a urinary tract infection was started on Cipro and has had some improvement.  He continues to have slowing of his urine flow, some hesitancy, and some frequency and mild dysuria.  He does not have a previous history of significant prostate obstruction.  He also notes that the area of his genitalia feels \"cold\".  No evidence of ischemia is noted.  He does have a history of chronic atrial fibrillation and is on chronic anticoagulation.  He also has a history of coronary artery disease with some mild peripheral vascular disease.  These are stable at this time.  He continues his blood pressure management with lisinopril as well as carvedilol.                         PAST, FAMILY,SOCIAL HISTORY:     Medical  History:   has a past medical history of Atrial fibrillation (H) (04/14/07), Atrial flutter (H), Coronary artery disease, Headache(784.0), Hepatitis, unspecified, Hyperlipidaemia, Hypertension, Measles without mention of complication, Mumps without mention of complication, Orchitis and epididymitis, unspecified, Other and unspecified hyperlipidemia, Other emphysema (H), Other speech disturbance, Pneumonia, organism unspecified(486), Shortness of breath, Urinary obstruction, and Varicella without mention of complication.     Surgical History:   has a past surgical history that includes LAPAROSCOPY, SURGICAL; CHOLECYSTECTOMY (1998); REMOVAL OF TONSILS,<13 Y/O; Colonoscopy w/wo [place] **Performed** (05/08/06); laminect/discectomy, lumbar (03/26/08); and Colonoscopy (9/13/2011).     Social History:   reports that he has quit smoking. His smoking " use included cigarettes. He smoked 0.50 packs per day. he has never used smokeless tobacco. He reports that he drinks alcohol. He reports that he does not use drugs.     Family History:  family history includes Alzheimer Disease in his mother; Cancer in his father; Cerebrovascular Disease in his mother and paternal uncle; Diabetes in his maternal aunt, mother, and another family member; EYE* in his mother; Genetic Disorder in his maternal grandmother; Heart Disease in his paternal uncle; Neurologic Disorder in his paternal uncle.            MEDICATIONS  Current Outpatient Medications   Medication Sig Dispense Refill     aspirin 81 MG tablet Take 1 tablet by mouth daily.       carvedilol (COREG) 12.5 MG tablet Take 1 tablet (12.5 mg) by mouth 2 times daily (with meals) 180 tablet 3     furosemide (LASIX) 20 MG tablet Take 1 tablet (20 mg) by mouth daily 90 tablet 3     JANTOVEN 5 MG tablet TAKE ONE AND ONE-HALF TABLETS BY MOUTH ON TUESDAY AND SATURDAY AND ONE TABLET ALL OTHER DAYS OF THE WEEK OR AS DIRECTED BY COUMADIN CLINIC 105 tablet 0     lisinopril (PRINIVIL/ZESTRIL) 10 MG tablet Take 1 tablet (10 mg) by mouth daily 90 tablet 3     simvastatin (ZOCOR) 80 MG tablet Take 1 tablet (80 mg) by mouth daily 90 tablet 1     sulfamethoxazole-trimethoprim (BACTRIM DS/SEPTRA DS) 800-160 MG tablet Take 1 tablet by mouth 2 times daily 60 tablet 0         --------------------------------------------------------------------------------------------------------------------                              Review of Systems       LUNGS: Pt denies: cough, excess sputum, hemoptysis, or shortness of breath.   HEART: Pt denies: chest pain, arrhythmia, syncope, tachy or bradyarrhythmia.   GI: Pt denies: nausea, vomiting, diarrhea, constipation, melena, or hematochezia.   NEURO: Pt denies: seizures, strokes, diplopia, weakness, paraesthesias, or paralysis.   SKIN: Pt denies: itching, rashes, discoloration, or specific lesions of concern.  Denies recent hair loss.   PSYCH: The patient denies significant depression, anxiety, mood imbalance. Specifically denies any suicidal ideation.   URINARY: Pt acknowledges: frequency, urgency, and hesitancy of urine. Denies current visible hematuria, or flank pain.                                     Examination         LUNGS: clear bilaterally, airflow is brisk, no intercostal retraction or stridor is noted. No coughing is noted during visit.   HEART: Irregularly irregular without rubs, clicks, gallops, or murmurs. PMI is nondisplaced. Upstrokes are brisk. S1,S2 are heard.   GI: Abdomen is soft, without rebound, guarding or tenderness. Bowel sounds are appropriate. No renal bruits are heard.   NEURO: Pt is alert and appropriate. No neurologic lateralization is noted. Cranial nerves 2-12 are intact. Peripheral sensory and motor function are grossly normal.    SKIN:  warm and dry. No erythema, or rashes are noted. No specific lesions of concern are noted.    PSYCH: The patient appears grossly appropriate. Maintains good eye contact, does not have any jittery or atypical motion. Displays appropriate affect.   URINARY: Jarrett's punch is negative.  Moderate suprapubic tenderness is noted with palpation.   GENITAL: Normal male genitalia is noted. Testicles are bilaterally present. No penile lesions are present.                                             Decision Making    1. Acute prostatitis  Continue antibiotic for total of 30 days    2. Urinary tract infection with hematuria, site unspecified  Recheck urine and check renal function  - BASIC METABOLIC PANEL  - CBC with Platelets    - sulfamethoxazole-trimethoprim (BACTRIM DS/SEPTRA DS) 800-160 MG tablet; Take 1 tablet by mouth 2 times daily  Dispense: 60 tablet; Refill: 0    3. Screening for prostate cancer  Rule out prostate cancer, get baseline PSA  - PSA, screen    4. Long term current use of anticoagulant therapy  Follow INR closely while on sulfa    5. Chronic  atrial fibrillation (H)  Continue anticoagulation and adjust INR     6.  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  This is followed by cardiology.  Recent echocardiogram was unremarkable.                        FOLLOW UP   I have asked the patient to make an appointment for followup with me in 2 weeks        I have carefully explained the diagnosis and treatment options to the patient.  The patient has displayed an understanding of the above, and all subsequent questions were answered.      DO TETE Fierro    Portions of this note were produced using [employer]  Although every attempt at real-time proof reading has been made, occasional grammar/syntax errors may have been missed.

## 2018-12-13 NOTE — RESULT ENCOUNTER NOTE
Dear Kevin, your recent test results are attached.  The PSA is normal suggesting a low risk of prostate cancer.  The blood sugar was minimally elevated at 121 and the kidney tests are normal.  Blood cell count is normal without evidence of anemia or leukemia.  Urine sample is unremarkable.    Feel free to contact me via the office or My Chart if you have any questions regarding the above.  Sincerely,  Chad Gee,  FACOI

## 2018-12-17 ENCOUNTER — ANTICOAGULATION THERAPY VISIT (OUTPATIENT)
Dept: ANTICOAGULATION | Facility: OTHER | Age: 83
End: 2018-12-17
Payer: COMMERCIAL

## 2018-12-17 DIAGNOSIS — Z79.01 LONG TERM CURRENT USE OF ANTICOAGULANT THERAPY: ICD-10-CM

## 2018-12-17 LAB — INR POINT OF CARE: 4.7 (ref 0.86–1.14)

## 2018-12-17 PROCEDURE — 99207 ZZC NO CHARGE NURSE ONLY: CPT

## 2018-12-17 PROCEDURE — 85610 PROTHROMBIN TIME: CPT | Mod: QW

## 2018-12-17 PROCEDURE — 36416 COLLJ CAPILLARY BLOOD SPEC: CPT

## 2018-12-17 NOTE — PROGRESS NOTES
ANTICOAGULATION FOLLOW-UP CLINIC VISIT    Patient Name:  Kevin Partida  Date:  2018  Contact Type:  Face to Face    SUBJECTIVE:     Patient Findings     Positives:   Antibiotic use or infection (Started on Bactrim x 1 month)           OBJECTIVE    INR Protime   Date Value Ref Range Status   2018 4.7 (A) 0.86 - 1.14 Final       ASSESSMENT / PLAN  INR assessment SUPRA    Recheck INR In: 10 DAYS    INR Location Clinic      Anticoagulation Summary  As of 2018    INR goal:   2.0-3.0   TTR:   45.5 % (2.7 y)   INR used for dosin.7! (2018)   Warfarin maintenance plan:   7.5 mg (5 mg x 1.5) every Sat; 5 mg (5 mg x 1) all other days   Full warfarin instructions:   : Hold; : 2.5 mg; : 5 mg; : 2.5 mg; Otherwise 7.5 mg every Sat; 5 mg all other days   Weekly warfarin total:   37.5 mg   Plan last modified:   Diego Almanzar RN (2018)   Next INR check:   2018   Target end date:       Indications    Long term current use of anticoagulant therapy [Z79.01]             Anticoagulation Episode Summary     INR check location:       Preferred lab:       Send INR reminders to:   [facility]    Comments:   5 mg tabs, PM dose, print out, BP      Anticoagulation Care Providers     Provider Role Specialty Phone number    Taqueria Borrego MD [facility] [phone]            See the Encounter Report to view Anticoagulation Flowsheet and Dosing Calendar (Go to Encounters tab in chart review, and find the Anticoagulation Therapy Visit)    Dosage adjustment made based on physician directed care plan.    Diego Almanzar RN

## 2018-12-20 ENCOUNTER — APPOINTMENT (OUTPATIENT)
Dept: GENERAL RADIOLOGY | Facility: CLINIC | Age: 83
End: 2018-12-20
Attending: EMERGENCY MEDICINE
Payer: MEDICARE

## 2018-12-20 ENCOUNTER — HOSPITAL ENCOUNTER (EMERGENCY)
Facility: CLINIC | Age: 83
Discharge: HOME | End: 2018-12-20
Attending: EMERGENCY MEDICINE | Admitting: EMERGENCY MEDICINE
Payer: MEDICARE

## 2018-12-20 ENCOUNTER — OFFICE VISIT (OUTPATIENT)
Dept: FAMILY MEDICINE | Facility: OTHER | Age: 83
End: 2018-12-20
Payer: COMMERCIAL

## 2018-12-20 VITALS
TEMPERATURE: 96.8 F | OXYGEN SATURATION: 96 % | DIASTOLIC BLOOD PRESSURE: 70 MMHG | BODY MASS INDEX: 31.55 KG/M2 | WEIGHT: 239.1 LBS | SYSTOLIC BLOOD PRESSURE: 118 MMHG | RESPIRATION RATE: 20 BRPM | HEART RATE: 80 BPM

## 2018-12-20 VITALS
WEIGHT: 239 LBS | TEMPERATURE: 98.1 F | BODY MASS INDEX: 31.68 KG/M2 | HEART RATE: 74 BPM | OXYGEN SATURATION: 97 % | DIASTOLIC BLOOD PRESSURE: 87 MMHG | HEIGHT: 73 IN | RESPIRATION RATE: 20 BRPM | SYSTOLIC BLOOD PRESSURE: 140 MMHG

## 2018-12-20 DIAGNOSIS — Z79.01 WARFARIN ANTICOAGULATION: ICD-10-CM

## 2018-12-20 DIAGNOSIS — M25.521 PAIN IN JOINT INVOLVING UPPER ARM, RIGHT: Primary | ICD-10-CM

## 2018-12-20 DIAGNOSIS — M79.601 RIGHT ARM PAIN: ICD-10-CM

## 2018-12-20 DIAGNOSIS — S40.021A CONTUSION OF RIGHT UPPER EXTREMITY, INITIAL ENCOUNTER: ICD-10-CM

## 2018-12-20 DIAGNOSIS — S49.91XA ARM INJURY, RIGHT, INITIAL ENCOUNTER: ICD-10-CM

## 2018-12-20 LAB — INR PPP: 2.67 (ref 0.86–1.14)

## 2018-12-20 PROCEDURE — 99285 EMERGENCY DEPT VISIT HI MDM: CPT | Performed by: EMERGENCY MEDICINE

## 2018-12-20 PROCEDURE — 99214 OFFICE O/P EST MOD 30 MIN: CPT | Performed by: PHYSICIAN ASSISTANT

## 2018-12-20 PROCEDURE — 73030 X-RAY EXAM OF SHOULDER: CPT | Mod: TC,RT

## 2018-12-20 PROCEDURE — 85610 PROTHROMBIN TIME: CPT | Performed by: EMERGENCY MEDICINE

## 2018-12-20 PROCEDURE — 73080 X-RAY EXAM OF ELBOW: CPT | Mod: TC,RT

## 2018-12-20 PROCEDURE — 99285 EMERGENCY DEPT VISIT HI MDM: CPT | Mod: Z6 | Performed by: EMERGENCY MEDICINE

## 2018-12-20 ASSESSMENT — PAIN SCALES - GENERAL: PAINLEVEL: WORST PAIN (10)

## 2018-12-20 ASSESSMENT — MIFFLIN-ST. JEOR: SCORE: 1832.98

## 2018-12-20 NOTE — ED PROVIDER NOTES
"  History     Chief Complaint   Patient presents with     Arm Pain     The history is provided by the patient and the spouse.     Kevin Partida is a 83 year old male who presents to the ED for right arm pain that started 2 days ago. Patient picked up about 100lb wood log and felt his right shoulder come out. He dropped the wood,  \"popped\" his shoulder back in, and continued working. Patient did not drop the wood on his arm. He noticed a large amount of bruising to his right arm about 1 hour after injury.  Since this incident, he has experienced pain and limited ROM. He can move his fingers, but has pain with any wrist and elbow motion.  He is still moving his shoulder normally. Patient is right handed. He is not taking anything for pain. Patient is taking Jantoven as he has 3 stents and A-Fib. Patient denies numbness and tingling.  He denies any other significant bruising or bleeding areas.    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 Discharged 04/15/07     Atrial flutter (H)      Coronary artery disease      Headache(784.0)      Hepatitis, unspecified      Hyperlipidaemia      Hypertension      Measles without mention of complication     Measles     Mumps without mention of complication     Mumps     Orchitis and epididymitis, unspecified      Other and unspecified hyperlipidemia      Other emphysema (H)      Other speech disturbance     Stuttering     Pneumonia, organism unspecified(486)     Pneumonia     Shortness of breath      Urinary obstruction      Varicella " without mention of complication     Chickenpox       Past Surgical History:   Procedure Laterality Date     COLONOSCOPY  2011    Procedure:COLONOSCOPY; colonoscopy; Surgeon:IVETH WIGGINS; Location:[facility]     HC COLONOSCOPY W/WO BRUSH/WASH  06     HC LAPAROSCOPY, SURGICAL; CHOLECYSTECTOMY      Cholecystectomy, Laparoscopic     HC REMOVAL OF TONSILS,<11 Y/O      Tonsils <12y.o.     LAMINECT/DISCECTOMY, LUMBAR  08    Right L4-L5 microlumbar diskectomy.       Family History   Problem Relation Age of Onset     Alzheimer Disease Mother         ?dimentia or alzheimers     Diabetes Mother         insulin     EYE* Mother         cataract     Cerebrovascular Disease Mother      Cancer Father         lymph tumor of glands     Diabetes Maternal Aunt         ? oral or insulin     Diabetes Other         4 cousins insulin dependent     Genetic Disorder Maternal Grandmother         tremors     Heart Disease Paternal Uncle         ? at age 60-70     Neurologic Disorder Paternal Uncle         parkinsons     Cerebrovascular Disease Paternal Uncle         ? dued at age 60-70       Social History     Tobacco Use     Smoking status: Former Smoker     Packs/day: 0.50     Types: Cigarettes     Smokeless tobacco: Never Used     Tobacco comment: smoked for 57 years- since age 9   Substance Use Topics     Alcohol use: Yes     Alcohol/week: 0.0 oz     Comment: socially        Immunization History   Administered Date(s) Administered     HepB 1993, 1993, 1994     Influenza (High Dose) 3 valent vaccine 2012     Pneumo Conj 13-V (2010&after) 10/11/2018     Pneumococcal 23 valent 2012     TD (ADULT, 7+) 2007     Tdap (Adacel,Boostrix) 10/08/2013          Allergies   Allergen Reactions     Codeine GI Disturbance     Niacin Hives     got very red and flushed.  Doesn't want       Current Outpatient Medications   Medication Sig Dispense Refill     aspirin 81 MG tablet Take 1 tablet by mouth  "daily.       carvedilol (COREG) 12.5 MG tablet Take 1 tablet (12.5 mg) by mouth 2 times daily (with meals) 180 tablet 3     furosemide (LASIX) 20 MG tablet Take 1 tablet (20 mg) by mouth daily 90 tablet 3     JANTOVEN 5 MG tablet TAKE ONE AND ONE-HALF TABLETS BY MOUTH ON TUESDAY AND SATURDAY AND ONE TABLET ALL OTHER DAYS OF THE WEEK OR AS DIRECTED BY COUMADIN CLINIC 105 tablet 0     lisinopril (PRINIVIL/ZESTRIL) 10 MG tablet Take 1 tablet (10 mg) by mouth daily 90 tablet 3     simvastatin (ZOCOR) 80 MG tablet Take 1 tablet (80 mg) by mouth daily 90 tablet 1     sulfamethoxazole-trimethoprim (BACTRIM DS/SEPTRA DS) 800-160 MG tablet Take 1 tablet by mouth 2 times daily 60 tablet 0           Review of Systems  All other ROS reviewed and are negative or non-contributory except as stated in HPI.    Physical Exam   BP: 146/88  Pulse: 74  Temp: 98.1  F (36.7  C)  Resp: 20  Height: 185.4 cm (6' 1\")  Weight: 108.4 kg (239 lb)  SpO2: 97 %      Physical Exam   Constitutional: He appears well-developed and well-nourished.   Very healthy-appearing, pleasant older male sitting in the bed   HENT:   Head: Normocephalic.   Nose: Nose normal.   Eyes: Conjunctivae and EOM are normal.   Neck: Normal range of motion. Neck supple.   Cardiovascular: Normal rate, normal heart sounds and intact distal pulses.   Irregularly irregular   Pulmonary/Chest: Effort normal and breath sounds normal. He exhibits no tenderness.   Musculoskeletal:        Arms:  Patient has normal sensation of the fingers, strong pulses, normal coloration   Neurological: He is alert.   Skin: Skin is warm and dry. He is not diaphoretic.   Psychiatric: He has a normal mood and affect. His behavior is normal.   Nursing note and vitals reviewed.      ED Course (with Medical Decision Making)    Pt seen and examined by me.  RN and EPIC notes reviewed.      Patient with injury to the right arm while carrying some wood.  He does note that he felt a \"pop with pain.  He states " "that this was at the shoulder but most of the bruising and swelling is above and below the elbow.  Distally, he has strong pulses, can move the fingers but notes pain with some of his wrist flexion extension, but especially when trying to pronate the arm.  He also has pain at and around the elbow.  No \"pallor or paresthesias\".  He is on Coumadin.  I suspect that he had some sort of tendon rupture or tear causing inflammation, swelling and bleeding which has been more extensive because of the Coumadin.  I am not convinced that he has any compartment syndrome at this point although it is a concern.    I spoke with Dr. smith.  He recommend checking INR and x-rays.  X-rays do not show any abnormalities.  INR is 2.67.    The arm was wrapped with an Ace wrap.  He should elevate it, consider icing or heat whatever feels better.  He has an appointment tomorrow for a recheck with orthopedics.  Return for concerns.        Procedures      Results for orders placed or performed during the hospital encounter of 12/20/18   XR Shoulder Right G/E 3 Views    Narrative    SHOULDER RIGHT THREE OR MORE VIEWS December 20, 2018 1:47 PM     HISTORY: Pain.    FINDINGS: Moderate AC arthrosis.      Impression    IMPRESSION: No fracture identified.     MULU AMEZCUA MD   XR Elbow Right G/E 3 Views    Narrative    RIGHT ELBOW THREE OR MORE VIEWS   12/20/2018 1:49 PM     HISTORY: Pain; swelling.    FINDINGS: Enthesopathy at the medial and lateral epicondyles. Minimal  osteophytosis medially.      Impression    IMPRESSION: No fracture identified.     MULU AMEZCUA MD   INR   Result Value Ref Range    INR 2.67 (H) 0.86 - 1.14     Medications - No data to display      Assessments & Plan      I have reviewed the findings, diagnosis, plan and need for follow up with the patient.       Medication List      There are no discharge medications for this visit.         Final diagnoses:   Right arm pain   Arm injury, right, initial encounter   Contusion of " right upper extremity, initial encounter   Warfarin anticoagulation     Disposition: Patient discharged home in stable condition.  Plan as above.  Return for concerns.      This document serves as a record of services personally performed by Jadyn Hernandez MD. It was created on their behalf by Liudmila Szymanski, a trained medical scribe. The creation of this record is based on the provider's personal observations and the statements of the patient. This document has been checked and approved by the attending provider.    Note: Chart documentation done in part with Dragon Voice Recognition software. Although reviewed after completion, some word and grammatical errors may remain.    12/20/2018   [facility] EMERGENCY DEPARTMENT     Jadyn Hernandez MD  12/21/18 0117

## 2018-12-20 NOTE — NURSING NOTE
Health Maintenance Due   Topic Date Due     ZOSTER IMMUNIZATION (1 of 2) 03/09/1985     DTAP/TDAP/TD IMMUNIZATION (2 - Td) 04/12/2017     OP ANNUAL INR REFERRAL  07/08/2017     HF ACTION PLAN Q3 YR  08/05/2017     INFLUENZA VACCINE (1) 09/01/2018     Shireen THORPE LPN

## 2018-12-20 NOTE — DISCHARGE INSTRUCTIONS
Try to elevate the arm as much as possible.    Okay to wrap to decrease swelling.    You can use either ice or heat to the arm.    Follow-up tomorrow with orthopedics as scheduled.    Return for significant worsening, changes or concerns.    I hope that this starts to heal quickly!!    Have a Merry [place]!!

## 2018-12-20 NOTE — ED TRIAGE NOTES
Picked up about a 100lb log of wood 2 days ago and felt his shoulder come out dropped the wood and poped his shoulder back in.  Has had pain in right shoulder ever since, large amount of bruising to right arm about 1hr after injury. Limited ROM, denies N/T to hand

## 2018-12-20 NOTE — ED AVS SNAPSHOT
[facility] Emergency Department  [address] [place] [address]  Phone:  [phone]  Fax:  [phone]                                    Kevin Partida   MRN: [id number]    Department:  [facility] Emergency Department   Date of Visit:  12/20/2018           After Visit Summary Signature Page    I have received my discharge instructions, and my questions have been answered. I have discussed any challenges I see with this plan with the nurse or doctor.    ..........................................................................................................................................  Patient/Patient Representative Signature      ..........................................................................................................................................  Patient Representative Print Name and Relationship to Patient    ..................................................               ................................................  Date                                   Time    ..........................................................................................................................................  Reviewed by Signature/Title    ...................................................              ..............................................  Date                                               Time          22EPIC Rev 08/18

## 2018-12-20 NOTE — PROGRESS NOTES
"  SUBJECTIVE:   Kevin Partida is a 83 year old male who presents to clinic today for the following health issues:      Concern - right arm injury  Onset: happened on 12/18/2018    Description:   Right arm injury    Intensity: severe    Progression of Symptoms:  worsening    Accompanying Signs & Symptoms:  Bruising and swelling    Previous history of similar problem:   No    Precipitating factors:   Worsened by: touching, use of the arm    Alleviating factors:  Improved by: nothing    Therapies Tried and outcome: nothing    Patient is an 83 year old male who presents today with 2-3 days of swelling and pain in his right arm. He is on anticoagulation for chronic Afib. Says that he was lifting pieces of wood on Tuesday when he felt a pop in his right shoulder and pain. He \"popped\" the shoulder back into place and kept working. He said that it was painful, but that he worked through the pain. That night he was unable to abduct or reach with the right upper extremity. By the next day considerable swelling and discoloration were present. This has since worsened and today the patient cannot move or touch the arm with sever pain, swelling extends to the the forearm impairing ability to flex elbow. Consulted with Tj, recommended that patient be seen in ED for possible doppler ultrasound. Discussed this with patient who agreed.     Problem list and histories reviewed & adjusted, as indicated.  Additional history:       Reviewed and updated as needed this visit by clinical staff  Tobacco  Allergies  Meds  Med Hx  Surg Hx  Fam Hx  Soc Hx      Reviewed and updated as needed this visit by Provider           OBJECTIVE:     /70 (BP Location: Left arm, Patient Position: Chair, Cuff Size: Adult Large)   Pulse 80   Temp 96.8  F (36  C) (Oral)   Resp 20   Wt 108.5 kg (239 lb 1.6 oz)   SpO2 96%   BMI 31.55 kg/m    Body mass index is 31.55 kg/m .  Exam deferred. Patient sent to ED due to concern about possible " bleed.     ASSESSMENT/PLAN:     1. Pain in joint involving upper arm, right  Consulted with Tj regarding concern for possible bleed in RUE leading to compartment swelling. She agreed with plan to send to ED for possible US of extremity.           Chapin Rojas PA-C  [facility]

## 2018-12-21 ENCOUNTER — OFFICE VISIT (OUTPATIENT)
Dept: ORTHOPEDICS | Facility: CLINIC | Age: 83
End: 2018-12-21
Payer: COMMERCIAL

## 2018-12-21 ENCOUNTER — ANCILLARY PROCEDURE (OUTPATIENT)
Dept: GENERAL RADIOLOGY | Facility: CLINIC | Age: 83
End: 2018-12-21
Attending: ORTHOPAEDIC SURGERY
Payer: COMMERCIAL

## 2018-12-21 VITALS — WEIGHT: 239 LBS | BODY MASS INDEX: 31.53 KG/M2 | TEMPERATURE: 97.4 F

## 2018-12-21 DIAGNOSIS — M25.511 RIGHT SHOULDER PAIN: ICD-10-CM

## 2018-12-21 DIAGNOSIS — S46.211A RUPTURE OF RIGHT DISTAL BICEPS TENDON, INITIAL ENCOUNTER: Primary | ICD-10-CM

## 2018-12-21 PROCEDURE — 73030 X-RAY EXAM OF SHOULDER: CPT | Mod: TC

## 2018-12-21 PROCEDURE — 99203 OFFICE O/P NEW LOW 30 MIN: CPT | Performed by: ORTHOPAEDIC SURGERY

## 2018-12-21 ASSESSMENT — PAIN SCALES - GENERAL: PAINLEVEL: MODERATE PAIN (5)

## 2018-12-21 NOTE — LETTER
12/21/2018         RE: Kevin Partida  [address] [facility] [address]        Dear Colleague,    Thank you for referring your patient, Kevin Partida, to the [facility]. Please see a copy of my visit note below.    ORTHOPEDIC CONSULT      Chief Complaint: Kevin Partida is a 83 year old male who is being seen for Chief Complaint   Patient presents with     Shoulder Pain     right shoulder injury- DOI: 12/18/2018     Consult     ref: Chapin Rojas       History of Present Illness:   Kevin Partida is a 83 year old male who is seen in consultation at the request of Chapin Rojas PA-C for evaluation of right shoulder injury.  Mechanism of Injury: DOI 12/18/18 (3 days ago) pulling a 100 pound log when felt a pop or tear in his shoulder and had immediate pain.  Later that day and into the next day started to notice swelling and bruising into the arm and shoulder by the 20th not getting any better and went into the ED.  States the pain is anterior arm, throughout the entire arm, throbbing sharp pressure.  Worse with movement or trying to lift the arm.  Denies any numbness. Denies any difficulty with moving his fingers.  Has been ace wrapping the arm, ice.    Takes coumadin due to having 3 x heart stenting. INR was 4.7 on 17th and 2.6 on the 20th. Goal is 2-3    Patient's past medical, surgical, social and family histories reviewed.     Past Medical History:   Diagnosis Date     Atrial fibrillation (H) 04/14/07    Admit. Discharged 04/15/07     Atrial flutter (H)      Coronary artery disease      Headache(784.0)      Hepatitis, unspecified      Hyperlipidaemia      Hypertension      Measles without mention of complication     Measles     Mumps without mention of complication     Mumps     Orchitis and epididymitis, unspecified      Other and unspecified hyperlipidemia      Other emphysema (H)      Other speech disturbance     Stuttering     Pneumonia, organism unspecified(486)      Pneumonia     Shortness of breath      Urinary obstruction      Varicella without mention of complication     Chickenpox       Past Surgical History:   Procedure Laterality Date     COLONOSCOPY  9/13/2011    Procedure:COLONOSCOPY; colonoscopy; Surgeon:IVETH WIGGINS; Location:[facility]     HC COLONOSCOPY W/WO BRUSH/WASH  05/08/06     HC LAPAROSCOPY, SURGICAL; CHOLECYSTECTOMY  1998    Cholecystectomy, Laparoscopic     HC REMOVAL OF TONSILS,<13 Y/O      Tonsils <12y.o.     LAMINECT/DISCECTOMY, LUMBAR  03/26/08    Right L4-L5 microlumbar diskectomy.       Medications:    Current Outpatient Medications on File Prior to Visit:  aspirin 81 MG tablet Take 1 tablet by mouth daily.   carvedilol (COREG) 12.5 MG tablet Take 1 tablet (12.5 mg) by mouth 2 times daily (with meals)   furosemide (LASIX) 20 MG tablet Take 1 tablet (20 mg) by mouth daily   JANTOVEN 5 MG tablet TAKE ONE AND ONE-HALF TABLETS BY MOUTH ON TUESDAY AND SATURDAY AND ONE TABLET ALL OTHER DAYS OF THE WEEK OR AS DIRECTED BY COUMADIN CLINIC   lisinopril (PRINIVIL/ZESTRIL) 10 MG tablet Take 1 tablet (10 mg) by mouth daily   simvastatin (ZOCOR) 80 MG tablet Take 1 tablet (80 mg) by mouth daily   sulfamethoxazole-trimethoprim (BACTRIM DS/SEPTRA DS) 800-160 MG tablet Take 1 tablet by mouth 2 times daily     No current facility-administered medications on file prior to visit.     Allergies   Allergen Reactions     Codeine GI Disturbance     Niacin Hives     got very red and flushed.  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     Employer: [employer]   Tobacco Use     Smoking status: Former Smoker     Packs/day: 0.50     Types: Cigarettes     Smokeless tobacco: Never Used     Tobacco comment: smoked for 57 years- since age 9   Substance and Sexual Activity     Alcohol use: Yes     Alcohol/week: 0.0 oz     Comment: socially     Drug use: No     Sexual activity: No       Family History   Problem Relation Age  of Onset     Alzheimer Disease Mother         ?dimentia or alzheimers     Diabetes Mother         insulin     EYE* Mother         cataract     Cerebrovascular Disease Mother      Cancer Father         lymph tumor of glands     Diabetes Maternal Aunt         ? oral or insulin     Diabetes Other         4 cousins insulin dependent     Genetic Disorder Maternal Grandmother         tremors     Heart Disease Paternal Uncle         ? at age 60-70     Neurologic Disorder Paternal Uncle         parkinsons     Cerebrovascular Disease Paternal Uncle         ? dued at age 60-70       REVIEW OF SYSTEMS  10 point review systems performed otherwise negative as noted as per history of present illness.    Physical Exam:  Vitals: Temp 97.4  F (36.3  C) (Temporal)   Wt 108.4 kg (239 lb)   BMI 31.53 kg/m     BMI= Body mass index is 31.53 kg/m .  Constitutional: healthy, alert and no acute distress   Psychiatric: mentation appears normal and affect normal/bright  NEURO: no focal deficits  RESP: Normal with easy respirations and no use of accessory muscles to breathe, no audible wheezing or retractions  CV: RUE:  Swelling and bruising from proximal upper extremity throughout hand (dark purple)  SKIN: ACE wrap removed, no breakdown under ace wrap. No erythema, rashes, excoriation, or breakdown. Significant bruising throughout upper right extremity. No evidence of infection.   JOINT/EXTREMITIES right arm: no identified deformity however moderate swelling throughout right upper extremity and into the hand, difficult to compare biceps due to swelling.  No tenderness to shoulder or anterior shoulder, however very tender to mid upper extremity anteriorly along biceps and especially tender at distal biceps. No focal bony tenderness. Diffusely tender throughout upper extremity.  Compartments of the arm are compressible and soft, mildly tender.  Unable to do hook test due to swelling. Slight pronation/supination, or flexion of the elbow  recreates pain.  Did not fully test motion or strength due to swelling, pain. 2+ radial pulse, fingers well perfused, cap refill <2, sensation intact. ROM and motor intact to fingers. No pain with ROM to the hand/fingers.     Lymph: no appreciated RUE lymphedema  GAIT: not tested     Diagnostic Modalities:  right shoulder X-ray: moderate degenerative changes to right AC joint. No fractures identified.  Humeral head in anatomic alignment no significant glenohumeral osteoarthritis..   Right elbow xray from 12/20/18 reviewed: no fractures identified.  Enthesopathy at medial and lateral epicondyles  Independent visualization of the images was performed.      Impression: right arm swelling and bruising- probable distal biceps rupture.    Plan:  All of the above pertinent physical exam and imaging modalities findings was reviewed with Kevin and his spouse.  There is expected bleeding and swelling with a bicep tendon rupture, added to this is a INR of 2.67 on the 20th and >4 on the 17th.  There is though no evidence of compartment syndrome or vascular compromise.  Discussed with patient and spouse that although likely with a bicep tendon rupture, if we wanted a definitive diagnosis we would obtain an MRI, however with his cardiac hx and coumadin usage along with the swelling currently would not be a surgical candidate.  Discussed starting with conservative measures especially strict  edema control.    I did discuss he would have some arm weakness due to rupture.  Recommended continue with ACE wrap and compression, elevation above the heart, and icing 20 minutes on, 40 minutes off when awake.   Will also order Occupational Therapy for swelling and pain management and then have patient follow-up in 2 weeks.     Return to clinic 2, week(s), or sooner as needed for changes.  Re-x-ray on return: No    Scribed by:  MARIELY Davis, CNP  9:55 AM  12/22/2018    I attest I have seen and evaluated the patient.  I agree with  above impression and plan.  Matt Gleason D.O.    Again, thank you for allowing me to participate in the care of your patient.        Sincerely,        Eugene Gleason, DO

## 2018-12-22 PROBLEM — S46.211A RUPTURE OF RIGHT DISTAL BICEPS TENDON, INITIAL ENCOUNTER: Status: ACTIVE | Noted: 2018-12-22

## 2018-12-22 NOTE — PROGRESS NOTES
ORTHOPEDIC CONSULT      Chief Complaint: Kevin Partida is a 83 year old male who is being seen for Chief Complaint   Patient presents with     Shoulder Pain     right shoulder injury- DOI: 12/18/2018     Consult     ref: Chapin Rojas       History of Present Illness:   Kevin Partida is a 83 year old male who is seen in consultation at the request of Chapin Rojas PA-C for evaluation of right shoulder injury.  Mechanism of Injury: DOI 12/18/18 (3 days ago) pulling a 100 pound log when felt a pop or tear in his shoulder and had immediate pain.  Later that day and into the next day started to notice swelling and bruising into the arm and shoulder by the 20th not getting any better and went into the ED.  States the pain is anterior arm, throughout the entire arm, throbbing sharp pressure.  Worse with movement or trying to lift the arm.  Denies any numbness. Denies any difficulty with moving his fingers.  Has been ace wrapping the arm, ice.    Takes coumadin due to having 3 x heart stenting. INR was 4.7 on 17th and 2.6 on the 20th. Goal is 2-3    Patient's past medical, surgical, social and family histories reviewed.     Past Medical History:   Diagnosis Date     Atrial fibrillation (H) 04/14/07    Admit. Discharged 04/15/07     Atrial flutter (H)      Coronary artery disease      Headache(784.0)      Hepatitis, unspecified      Hyperlipidaemia      Hypertension      Measles without mention of complication     Measles     Mumps without mention of complication     Mumps     Orchitis and epididymitis, unspecified      Other and unspecified hyperlipidemia      Other emphysema (H)      Other speech disturbance     Stuttering     Pneumonia, organism unspecified(486)     Pneumonia     Shortness of breath      Urinary obstruction      Varicella without mention of complication     Chickenpox       Past Surgical History:   Procedure Laterality Date     COLONOSCOPY  9/13/2011    Procedure:COLONOSCOPY; colonoscopy;  Surgeon:IVETH WIGGINS; Location: GI     HC COLONOSCOPY W/WO BRUSH/WASH  05/08/06     HC LAPAROSCOPY, SURGICAL; CHOLECYSTECTOMY  1998    Cholecystectomy, Laparoscopic     HC REMOVAL OF TONSILS,<13 Y/O      Tonsils <12y.o.     LAMINECT/DISCECTOMY, LUMBAR  03/26/08    Right L4-L5 microlumbar diskectomy.       Medications:    Current Outpatient Medications on File Prior to Visit:  aspirin 81 MG tablet Take 1 tablet by mouth daily.   carvedilol (COREG) 12.5 MG tablet Take 1 tablet (12.5 mg) by mouth 2 times daily (with meals)   furosemide (LASIX) 20 MG tablet Take 1 tablet (20 mg) by mouth daily   JANTOVEN 5 MG tablet TAKE ONE AND ONE-HALF TABLETS BY MOUTH ON TUESDAY AND SATURDAY AND ONE TABLET ALL OTHER DAYS OF THE WEEK OR AS DIRECTED BY COUMADIN CLINIC   lisinopril (PRINIVIL/ZESTRIL) 10 MG tablet Take 1 tablet (10 mg) by mouth daily   simvastatin (ZOCOR) 80 MG tablet Take 1 tablet (80 mg) by mouth daily   sulfamethoxazole-trimethoprim (BACTRIM DS/SEPTRA DS) 800-160 MG tablet Take 1 tablet by mouth 2 times daily     No current facility-administered medications on file prior to visit.     Allergies   Allergen Reactions     Codeine GI Disturbance     Niacin Hives     got very red and flushed.  Doesn't want       Social History     Occupational History     Occupation: retired telephone tech     Employer: [employer]   Tobacco Use     Smoking status: Former Smoker     Packs/day: 0.50     Types: Cigarettes     Smokeless tobacco: Never Used     Tobacco comment: smoked for 57 years- since age 9   Substance and Sexual Activity     Alcohol use: Yes     Alcohol/week: 0.0 oz     Comment: socially     Drug use: No     Sexual activity: No       Family History   Problem Relation Age of Onset     Alzheimer Disease Mother         ?dimentia or alzheimers     Diabetes Mother         insulin     EYE* Mother         cataract     Cerebrovascular Disease Mother      Cancer Father         lymph tumor of glands     Diabetes Maternal  Aunt         ? oral or insulin     Diabetes Other         4 cousins insulin dependent     Genetic Disorder Maternal Grandmother         tremors     Heart Disease Paternal Uncle         ? at age 60-70     Neurologic Disorder Paternal Uncle         parkinsons     Cerebrovascular Disease Paternal Uncle         ? dued at age 60-70       REVIEW OF SYSTEMS  10 point review systems performed otherwise negative as noted as per history of present illness.    Physical Exam:  Vitals: Temp 97.4  F (36.3  C) (Temporal)   Wt 108.4 kg (239 lb)   BMI 31.53 kg/m    BMI= Body mass index is 31.53 kg/m .  Constitutional: healthy, alert and no acute distress   Psychiatric: mentation appears normal and affect normal/bright  NEURO: no focal deficits  RESP: Normal with easy respirations and no use of accessory muscles to breathe, no audible wheezing or retractions  CV: RUE:  Swelling and bruising from proximal upper extremity throughout hand (dark purple)  SKIN: ACE wrap removed, no breakdown under ace wrap. No erythema, rashes, excoriation, or breakdown. Significant bruising throughout upper right extremity. No evidence of infection.   JOINT/EXTREMITIES right arm: no identified deformity however moderate swelling throughout right upper extremity and into the hand, difficult to compare biceps due to swelling.  No tenderness to shoulder or anterior shoulder, however very tender to mid upper extremity anteriorly along biceps and especially tender at distal biceps. No focal bony tenderness. Diffusely tender throughout upper extremity.  Compartments of the arm are compressible and soft, mildly tender.  Unable to do hook test due to swelling. Slight pronation/supination, or flexion of the elbow recreates pain.  Did not fully test motion or strength due to swelling, pain. 2+ radial pulse, fingers well perfused, cap refill <2, sensation intact. ROM and motor intact to fingers. No pain with ROM to the hand/fingers.     Lymph: no appreciated  RUE lymphedema  GAIT: not tested     Diagnostic Modalities:  right shoulder X-ray: moderate degenerative changes to right AC joint. No fractures identified.  Humeral head in anatomic alignment no significant glenohumeral osteoarthritis..   Right elbow xray from 12/20/18 reviewed: no fractures identified.  Enthesopathy at medial and lateral epicondyles  Independent visualization of the images was performed.      Impression: right arm swelling and bruising- probable distal biceps rupture.    Plan:  All of the above pertinent physical exam and imaging modalities findings was reviewed with Kevin and his spouse.  There is expected bleeding and swelling with a bicep tendon rupture, added to this is a INR of 2.67 on the 20th and >4 on the 17th.  There is though no evidence of compartment syndrome or vascular compromise.  Discussed with patient and spouse that although likely with a bicep tendon rupture, if we wanted a definitive diagnosis we would obtain an MRI, however with his cardiac hx and coumadin usage along with the swelling currently would not be a surgical candidate.  Discussed starting with conservative measures especially strict  edema control.    I did discuss he would have some arm weakness due to rupture.  Recommended continue with ACE wrap and compression, elevation above the heart, and icing 20 minutes on, 40 minutes off when awake.   Will also order Occupational Therapy for swelling and pain management and then have patient follow-up in 2 weeks.     Return to clinic 2, week(s), or sooner as needed for changes.  Re-x-ray on return: No    Scribed by:  MARIELY Davis, CNP  9:55 AM  12/22/2018    I attest I have seen and evaluated the patient.  I agree with above impression and plan.  Matt Gleason D.O.

## 2018-12-26 ENCOUNTER — TELEPHONE (OUTPATIENT)
Dept: INTERNAL MEDICINE | Facility: CLINIC | Age: 83
End: 2018-12-26

## 2018-12-26 NOTE — TELEPHONE ENCOUNTER
Reason for Call:  Other call back    Detailed comments: Patients wife calling to cancel husbands INR appt for tomorrow 12.27 due to the snowstorm. Wife wondering if patient should come in today and get INR checked 12.26. Please call back as soon as possible today.     Phone Number Patient can be reached at: Home number on file [phone] (home)    Best Time: any    Can we leave a detailed message on this number? YES    Call taken on 12/26/2018 at 7:45 AM by Cindy Yates

## 2018-12-26 NOTE — TELEPHONE ENCOUNTER
I spoke with pt's wife who stated she cancelled pt's INR check tomorrow because of the snow storm that is supposed to be coming. She states they won't be able to get out 'for a couple days'. I let her know that the bactrim that he was started on 12/20 can increase INR and that she needs to watch for increased bleeding and bruising and that if he experiences any, he needs to come in. She reported understanding.   She also said that pt is taking 2.5 mg Tues, Wed, Sun and 5 mg ROW.     Janis Green RN

## 2019-01-02 ENCOUNTER — OFFICE VISIT (OUTPATIENT)
Dept: FAMILY MEDICINE | Facility: OTHER | Age: 84
End: 2019-01-02
Payer: COMMERCIAL

## 2019-01-02 ENCOUNTER — ANTICOAGULATION THERAPY VISIT (OUTPATIENT)
Dept: ANTICOAGULATION | Facility: OTHER | Age: 84
End: 2019-01-02
Payer: COMMERCIAL

## 2019-01-02 VITALS
DIASTOLIC BLOOD PRESSURE: 80 MMHG | WEIGHT: 234.9 LBS | HEIGHT: 73 IN | SYSTOLIC BLOOD PRESSURE: 132 MMHG | RESPIRATION RATE: 16 BRPM | TEMPERATURE: 97.9 F | HEART RATE: 76 BPM | BODY MASS INDEX: 31.13 KG/M2

## 2019-01-02 DIAGNOSIS — N41.0 ACUTE PROSTATITIS: Primary | ICD-10-CM

## 2019-01-02 DIAGNOSIS — Z79.01 LONG TERM CURRENT USE OF ANTICOAGULANT THERAPY: ICD-10-CM

## 2019-01-02 DIAGNOSIS — S46.211S BICEPS RUPTURE, DISTAL, RIGHT, SEQUELA: ICD-10-CM

## 2019-01-02 DIAGNOSIS — I42.9 PRIMARY CARDIOMYOPATHY (H): ICD-10-CM

## 2019-01-02 DIAGNOSIS — I48.20 CHRONIC ATRIAL FIBRILLATION (H): ICD-10-CM

## 2019-01-02 LAB — INR POINT OF CARE: 2.6 (ref 0.86–1.14)

## 2019-01-02 PROCEDURE — 36416 COLLJ CAPILLARY BLOOD SPEC: CPT

## 2019-01-02 PROCEDURE — 85610 PROTHROMBIN TIME: CPT | Mod: QW

## 2019-01-02 PROCEDURE — 99207 ZZC NO CHARGE NURSE ONLY: CPT

## 2019-01-02 PROCEDURE — 99213 OFFICE O/P EST LOW 20 MIN: CPT | Performed by: INTERNAL MEDICINE

## 2019-01-02 ASSESSMENT — MIFFLIN-ST. JEOR: SCORE: 1814.38

## 2019-01-02 ASSESSMENT — PAIN SCALES - GENERAL: PAINLEVEL: NO PAIN (0)

## 2019-01-02 NOTE — PROGRESS NOTES
ANTICOAGULATION FOLLOW-UP CLINIC VISIT    Patient Name:  Kevin Partida  Date:  2019  Contact Type:  Face to Face    SUBJECTIVE:     Patient Findings     Positives:   Antibiotic use or infection (Still on Bactrim until 19), No Problem Findings           OBJECTIVE    INR Protime   Date Value Ref Range Status   2019 2.6 (A) 0.86 - 1.14 Final       ASSESSMENT / PLAN  INR assessment THER    Recheck INR In: 1 WEEK    INR Location Clinic      Anticoagulation Summary  As of 2019    INR goal:   2.0-3.0   TTR:   46.1 % (2.8 y)   INR used for dosin.6 (2019)   Warfarin maintenance plan:   7.5 mg (5 mg x 1.5) every Sat; 5 mg (5 mg x 1) all other days   Full warfarin instructions:   7.5 mg every Sat; 5 mg all other days   Weekly warfarin total:   37.5 mg   No change documented:   Diego Almanzar RN   Plan last modified:   Diego Almanzar RN (2018)   Next INR check:   1/10/2019   Target end date:       Indications    Long term current use of anticoagulant therapy [Z79.01]             Anticoagulation Episode Summary     INR check location:       Preferred lab:       Send INR reminders to:   [facility]    Comments:   5 mg tabs, PM dose, print out, BP      Anticoagulation Care Providers     Provider Role Specialty Phone number    Taqueria Borrego MD [facility] [phone]            See the Encounter Report to view Anticoagulation Flowsheet and Dosing Calendar (Go to Encounters tab in chart review, and find the Anticoagulation Therapy Visit)    Dosage adjustment made based on physician directed care plan.    Diego Almanzar RN

## 2019-01-02 NOTE — PROGRESS NOTES
SUBJECTIVE:   Kevin Partida is a 83 year old male who presents to clinic today for the following health issues:      Chief Complaint   Patient presents with     Follow Up                     Chief Complaint         The patient is a pleasant 83-year-old gentleman who presents today for follow-up of what is believed to be prostatitis.  He was having some hematuria and urinary incontinence with frequency in late November.  He was started on Bactrim for 30 days and is now about to complete the course.  He has had no further frequency, urgency or hematuria.  Denies any pelvic discomfort.  His last PSA was 1.0.  In the interim, he has ruptured his right bicipital tendon and those notes are reviewed.  He is opted not to pursue surgery and I have recommended he consider physical therapy.  He will take this under advisement.  The significant ecchymosis and hematoma of his arm has improved.  He notes that he has full range of motion but limited strength.  I have explained to the patient that he should not be trying to move 200 pound logs in the future anyway.  He does have a history of atrial fibrillation and is on chronic anticoagulation for this.  Levels are followed closely.                         PAST, FAMILY,SOCIAL HISTORY:     Medical  History:   has a past medical history of Atrial fibrillation (H) (04/14/07), Atrial flutter (H), Coronary artery disease, Headache(784.0), Hepatitis, unspecified, Hyperlipidaemia, Hypertension, Measles without mention of complication, Mumps without mention of complication, Orchitis and epididymitis, unspecified, Other and unspecified hyperlipidemia, Other emphysema (H), Other speech disturbance, Pneumonia, organism unspecified(486), Shortness of breath, Urinary obstruction, and Varicella without mention of complication.     Surgical History:   has a past surgical history that includes LAPAROSCOPY, SURGICAL; CHOLECYSTECTOMY (1998); REMOVAL OF TONSILS,<13 Y/O; Colonoscopy w/wo Brush  **Performed** (05/08/06); laminect/discectomy, lumbar (03/26/08); and Colonoscopy (9/13/2011).     Social History:   reports that he has quit smoking. His smoking use included cigarettes. He smoked 0.50 packs per day. he has never used smokeless tobacco. He reports that he drinks alcohol. He reports that he does not use drugs.     Family History:  family history includes Alzheimer Disease in his mother; Cancer in his father; Cerebrovascular Disease in his mother and paternal uncle; Diabetes in his maternal aunt, mother, and another family member; EYE* in his mother; Genetic Disorder in his maternal grandmother; Heart Disease in his paternal uncle; Neurologic Disorder in his paternal uncle.            MEDICATIONS  Current Outpatient Medications   Medication Sig Dispense Refill     aspirin 81 MG tablet Take 1 tablet by mouth daily.       carvedilol (COREG) 12.5 MG tablet Take 1 tablet (12.5 mg) by mouth 2 times daily (with meals) 180 tablet 3     furosemide (LASIX) 20 MG tablet Take 1 tablet (20 mg) by mouth daily 90 tablet 3     JANTOVEN 5 MG tablet TAKE ONE AND ONE-HALF TABLETS BY MOUTH ON TUESDAY AND SATURDAY AND ONE TABLET ALL OTHER DAYS OF THE WEEK OR AS DIRECTED BY COUMADIN CLINIC 105 tablet 0     lisinopril (PRINIVIL/ZESTRIL) 10 MG tablet Take 1 tablet (10 mg) by mouth daily 90 tablet 3     simvastatin (ZOCOR) 80 MG tablet Take 1 tablet (80 mg) by mouth daily 90 tablet 1         --------------------------------------------------------------------------------------------------------------------                              Review of Systems     LUNGS: Pt denies: cough, excess sputum, hemoptysis, or shortness of breath.   HEART: Pt denies: chest pain, symptomatic arrhythmia, syncope, tachy or bradyarrhythmia.   GI: Pt denies: nausea, vomiting, diarrhea, constipation, melena, or hematochezia.   NEURO: Pt denies: seizures, strokes, diplopia, weakness, paraesthesias, or paralysis.   SKIN: Pt denies: itching, rashes,  "discoloration, or specific lesions of concern. Denies recent hair loss.                                     Examination      /80 (BP Location: Right arm, Patient Position: Sitting, Cuff Size: Adult Large)   Pulse 76   Temp 97.9  F (36.6  C) (Temporal)   Resp 16   Ht 1.854 m (6' 1\")   Wt 106.5 kg (234 lb 14.4 oz)   BMI 30.99 kg/m     Constitutional: The patient appears to be in no acute distress. The patient appears to be adequately hydrated. No acute respiratory or hemodynamic distress is noted at this time.   LUNGS: clear bilaterally, airflow is brisk, no intercostal retraction or stridor is noted. No coughing is noted during visit.   HEART:  regular without rubs, clicks, gallops, or murmurs. PMI is nondisplaced. Upstrokes are brisk. S1,S2 are heard.   URINARY: Jarrett's punch is negative. No suprapubic tenderness is noted with palpation.   MS: Minimal crepitance is noted in the right elbow and shoulder.  Some discoloration still present.  No significant hematoma is noted.  Strength is decreased but range of motion is full. No deformity is present. Muscle strength is appropriate and equal bilaterally. No acute joint erythema or swelling is present.                                             Decision Making    1. Acute prostatitis  Markedly improved, conclude antibiotic    2. Chronic atrial fibrillation (H)  Continue anticoagulation.  Will need readjustment off of the Bactrim    3. Biceps rupture, distal, right, sequela  Conservative therapy.  Recommend physical therapy    4. Primary cardiomyopathy (H)  Currently no signs of congestive heart failure.  Continue current meds                         FOLLOW UP   I have asked the patient to make an appointment for followup with me in 4 months or as needed      "

## 2019-01-04 ENCOUNTER — OFFICE VISIT (OUTPATIENT)
Dept: ORTHOPEDICS | Facility: CLINIC | Age: 84
End: 2019-01-04
Payer: COMMERCIAL

## 2019-01-04 VITALS
HEIGHT: 72 IN | DIASTOLIC BLOOD PRESSURE: 70 MMHG | SYSTOLIC BLOOD PRESSURE: 120 MMHG | BODY MASS INDEX: 31.69 KG/M2 | WEIGHT: 234 LBS

## 2019-01-04 DIAGNOSIS — I48.20 CHRONIC ATRIAL FIBRILLATION (H): ICD-10-CM

## 2019-01-04 DIAGNOSIS — S46.211A RUPTURE OF RIGHT DISTAL BICEPS TENDON, INITIAL ENCOUNTER: Primary | ICD-10-CM

## 2019-01-04 PROCEDURE — 99213 OFFICE O/P EST LOW 20 MIN: CPT | Performed by: ORTHOPAEDIC SURGERY

## 2019-01-04 RX ORDER — WARFARIN SODIUM 5 MG/1
TABLET ORAL
Qty: 105 TABLET | Refills: 0 | Status: SHIPPED | OUTPATIENT
Start: 2019-01-04 | End: 2019-04-12

## 2019-01-04 ASSESSMENT — MIFFLIN-ST. JEOR: SCORE: 1798.39

## 2019-01-04 ASSESSMENT — PAIN SCALES - GENERAL: PAINLEVEL: NO PAIN (0)

## 2019-01-04 NOTE — LETTER
"    1/4/2019         RE: Kevin Partida  [address] [facility] [address]        Dear Colleague,    Thank you for referring your patient, Kevin Partida, to the [facility]. Please see a copy of my visit note below.    Office Visit-Follow up    Chief Complaint: Kevin Partida is a 83 year old male who is being seen for   Chief Complaint   Patient presents with     RECHECK     right arm swelling and bruising- probable distal biceps rupture- DOI: 12/18/2018       History of Present Illness:   This better.  Swelling ecchymosis is nearly resolved.  Some soreness in his bicep.  He feels his motion is much better.  He presents with his wife.    REVIEW OF SYSTEMS  General: negative for, night sweats, dizziness, fatigue  Resp: No shortness of breath and no cough  CV: negative for chest pain, syncope or near-syncope  GI: negative for nausea, vomiting and diarrhea  : negative for dysuria and hematuria  Musculoskeletal: as above  Neurologic: negative for syncope   Hematologic: negative for bleeding disorder    Physical Exam:  Vitals: /70   Ht 1.835 m (6' 0.25\")   Wt 106.1 kg (234 lb)   BMI 31.52 kg/m     BMI= Body mass index is 31.52 kg/m .  Constitutional: healthy, alert and no acute distress   Psychiatric: mentation appears normal and affect normal/bright  NEURO: no focal deficits  RESP: Normal with easy respirations and no use of accessory muscles to breathe, no audible wheezing or retractions  CV: RUE:  no edema           SKIN: No erythema, rashes, excoriation, or breakdown. No evidence of infection.   JOINT/EXTREMITIES:right arm: Ecchymosis swelling has resolved.  He has near normal flexion and extension.  He does have some limitations to forearm rotation which causes some bicipital type pain.  Distal neurovascular is grossly intact  GAIT: not tested             Diagnostic Modalities:  None today.  Independent visualization of the images was performed.      Impression: right distal " biceps rupture being treated conservatively    Plan:  All of the above pertinent physical exam and imaging modalities findings was reviewed with Kevin and his wife.    He is made considerable progress since his last visit.  Recommend he continue working on the motion on his own.  We discussed therapy.  He has progressed nicely.  Plan to give him a couple more weeks.  If he has continued issues return back to the office for reevaluation.  He will work on motion on his own at this time.    Return to clinic 2, week(s), PRN, or sooner as needed for changes.  Re-x-ray on return: No    Matt Gleason D.O.          Again, thank you for allowing me to participate in the care of your patient.        Sincerely,        Eugene Gleason, DO

## 2019-01-04 NOTE — PROGRESS NOTES
"Office Visit-Follow up    Chief Complaint: Kevin Partida is a 83 year old male who is being seen for   Chief Complaint   Patient presents with     RECHECK     right arm swelling and bruising- probable distal biceps rupture- DOI: 12/18/2018       History of Present Illness:   This better.  Swelling ecchymosis is nearly resolved.  Some soreness in his bicep.  He feels his motion is much better.  He presents with his wife.    REVIEW OF SYSTEMS  General: negative for, night sweats, dizziness, fatigue  Resp: No shortness of breath and no cough  CV: negative for chest pain, syncope or near-syncope  GI: negative for nausea, vomiting and diarrhea  : negative for dysuria and hematuria  Musculoskeletal: as above  Neurologic: negative for syncope   Hematologic: negative for bleeding disorder    Physical Exam:  Vitals: /70   Ht 1.835 m (6' 0.25\")   Wt 106.1 kg (234 lb)   BMI 31.52 kg/m    BMI= Body mass index is 31.52 kg/m .  Constitutional: healthy, alert and no acute distress   Psychiatric: mentation appears normal and affect normal/bright  NEURO: no focal deficits  RESP: Normal with easy respirations and no use of accessory muscles to breathe, no audible wheezing or retractions  CV: RUE:  no edema           SKIN: No erythema, rashes, excoriation, or breakdown. No evidence of infection.   JOINT/EXTREMITIES:right arm: Ecchymosis swelling has resolved.  He has near normal flexion and extension.  He does have some limitations to forearm rotation which causes some bicipital type pain.  Distal neurovascular is grossly intact  GAIT: not tested             Diagnostic Modalities:  None today.  Independent visualization of the images was performed.      Impression: right distal biceps rupture being treated conservatively    Plan:  All of the above pertinent physical exam and imaging modalities findings was reviewed with Kevin and his wife.    He is made considerable progress since his last visit.  Recommend he continue " working on the motion on his own.  We discussed therapy.  He has progressed nicely.  Plan to give him a couple more weeks.  If he has continued issues return back to the office for reevaluation.  He will work on motion on his own at this time.    Return to clinic 2, week(s), PRN, or sooner as needed for changes.  Re-x-ray on return: No    Matt Gleason D.O.

## 2019-01-10 ENCOUNTER — ANTICOAGULATION THERAPY VISIT (OUTPATIENT)
Dept: ANTICOAGULATION | Facility: OTHER | Age: 84
End: 2019-01-10
Payer: COMMERCIAL

## 2019-01-10 DIAGNOSIS — Z79.01 LONG TERM CURRENT USE OF ANTICOAGULANT THERAPY: ICD-10-CM

## 2019-01-10 LAB — INR POINT OF CARE: 2.9 (ref 0.86–1.14)

## 2019-01-10 PROCEDURE — 99207 ZZC NO CHARGE NURSE ONLY: CPT

## 2019-01-10 PROCEDURE — 85610 PROTHROMBIN TIME: CPT | Mod: QW

## 2019-01-10 PROCEDURE — 36416 COLLJ CAPILLARY BLOOD SPEC: CPT

## 2019-01-10 NOTE — PROGRESS NOTES
ANTICOAGULATION FOLLOW-UP CLINIC VISIT    Patient Name:  Kevin Partida  Date:  1/10/2019  Contact Type:  Face to Face    SUBJECTIVE:     Patient Findings     Positives:   Antibiotic use or infection (Done with antibiotic), No Problem Findings           OBJECTIVE    INR Protime   Date Value Ref Range Status   01/10/2019 2.9 (A) 0.86 - 1.14 Final       ASSESSMENT / PLAN  INR assessment THER    Recheck INR In: 2 WEEKS    INR Location Clinic      Anticoagulation Summary  As of 1/10/2019    INR goal:   2.0-3.0   TTR:   46.5 % (2.8 y)   INR used for dosin.9 (1/10/2019)   Warfarin maintenance plan:   5 mg (5 mg x 1) every day   Full warfarin instructions:   5 mg every day   Weekly warfarin total:   35 mg   Plan last modified:   Diego Almanzar RN (1/10/2019)   Next INR check:   2019   Target end date:       Indications    Long term current use of anticoagulant therapy [Z79.01]             Anticoagulation Episode Summary     INR check location:       Preferred lab:       Send INR reminders to:   HARRIS ROBIN    Comments:   5 mg tabs, PM dose, print out, BP      Anticoagulation Care Providers     Provider Role Specialty Phone number    Taqueria Borrego MD [facility] [phone]            See the Encounter Report to view Anticoagulation Flowsheet and Dosing Calendar (Go to Encounters tab in chart review, and find the Anticoagulation Therapy Visit)    Dosage adjustment made based on physician directed care plan.    Diego Almanzar RN

## 2019-01-24 ENCOUNTER — ANTICOAGULATION THERAPY VISIT (OUTPATIENT)
Dept: ANTICOAGULATION | Facility: OTHER | Age: 84
End: 2019-01-24
Payer: COMMERCIAL

## 2019-01-24 DIAGNOSIS — Z79.01 LONG TERM CURRENT USE OF ANTICOAGULANT THERAPY: ICD-10-CM

## 2019-01-24 LAB — INR POINT OF CARE: 2.6 (ref 0.86–1.14)

## 2019-01-24 PROCEDURE — 99207 ZZC NO CHARGE NURSE ONLY: CPT

## 2019-01-24 PROCEDURE — 36416 COLLJ CAPILLARY BLOOD SPEC: CPT

## 2019-01-24 PROCEDURE — 85610 PROTHROMBIN TIME: CPT | Mod: QW

## 2019-01-24 NOTE — PROGRESS NOTES
ANTICOAGULATION FOLLOW-UP CLINIC VISIT    Patient Name:  Kevin Partida  Date:  2019  Contact Type:  Face to Face    SUBJECTIVE:     Patient Findings     Positives:   No Problem Findings           OBJECTIVE    INR Protime   Date Value Ref Range Status   2019 2.6 (A) 0.86 - 1.14 Final       ASSESSMENT / PLAN  INR assessment THER    Recheck INR In: 10 DAYS    INR Location Clinic      Anticoagulation Summary  As of 2019    INR goal:   2.0-3.0   TTR:   47.2 % (2.8 y)   INR used for dosin.6 (2019)   Warfarin maintenance plan:   5 mg (5 mg x 1) every day   Full warfarin instructions:   5 mg every day   Weekly warfarin total:   35 mg   No change documented:   Diego Almanzar RN   Plan last modified:   Diego Almanzar RN (1/10/2019)   Next INR check:   2019   Target end date:       Indications    Long term current use of anticoagulant therapy [Z79.01]             Anticoagulation Episode Summary     INR check location:       Preferred lab:       Send INR reminders to:   HARRIS ROBIN    Comments:   5 mg tabs, PM dose, print out, BP      Anticoagulation Care Providers     Provider Role Specialty Phone number    Taqueria Borrego MD [facility] [phone]            See the Encounter Report to view Anticoagulation Flowsheet and Dosing Calendar (Go to Encounters tab in chart review, and find the Anticoagulation Therapy Visit)    Dosage adjustment made based on physician directed care plan.    Diego Almanzar RN

## 2019-01-28 DIAGNOSIS — I25.10 CORONARY ARTERY DISEASE INVOLVING NATIVE CORONARY ARTERY WITHOUT ANGINA PECTORIS: ICD-10-CM

## 2019-01-28 RX ORDER — LISINOPRIL 10 MG/1
10 TABLET ORAL DAILY
Qty: 90 TABLET | Refills: 0 | Status: SHIPPED | OUTPATIENT
Start: 2019-01-28 | End: 2019-03-27

## 2019-02-04 ENCOUNTER — ANTICOAGULATION THERAPY VISIT (OUTPATIENT)
Dept: ANTICOAGULATION | Facility: OTHER | Age: 84
End: 2019-02-04
Payer: COMMERCIAL

## 2019-02-04 ENCOUNTER — OFFICE VISIT (OUTPATIENT)
Dept: FAMILY MEDICINE | Facility: OTHER | Age: 84
End: 2019-02-04
Payer: COMMERCIAL

## 2019-02-04 VITALS
SYSTOLIC BLOOD PRESSURE: 116 MMHG | RESPIRATION RATE: 14 BRPM | HEART RATE: 100 BPM | OXYGEN SATURATION: 93 % | TEMPERATURE: 97.4 F | BODY MASS INDEX: 31.92 KG/M2 | DIASTOLIC BLOOD PRESSURE: 68 MMHG | WEIGHT: 237 LBS

## 2019-02-04 DIAGNOSIS — R33.8 BENIGN PROSTATIC HYPERPLASIA WITH URINARY RETENTION: ICD-10-CM

## 2019-02-04 DIAGNOSIS — Z79.01 LONG TERM CURRENT USE OF ANTICOAGULANT THERAPY: ICD-10-CM

## 2019-02-04 DIAGNOSIS — I48.20 CHRONIC ATRIAL FIBRILLATION (H): ICD-10-CM

## 2019-02-04 DIAGNOSIS — N40.1 BENIGN PROSTATIC HYPERPLASIA WITH URINARY RETENTION: ICD-10-CM

## 2019-02-04 DIAGNOSIS — I25.10 CORONARY ARTERY DISEASE INVOLVING NATIVE CORONARY ARTERY OF NATIVE HEART WITHOUT ANGINA PECTORIS: Primary | ICD-10-CM

## 2019-02-04 LAB — INR POINT OF CARE: 2 (ref 0.86–1.14)

## 2019-02-04 PROCEDURE — 36416 COLLJ CAPILLARY BLOOD SPEC: CPT

## 2019-02-04 PROCEDURE — 85610 PROTHROMBIN TIME: CPT | Mod: QW

## 2019-02-04 PROCEDURE — 99207 ZZC NO CHARGE NURSE ONLY: CPT

## 2019-02-04 PROCEDURE — 99214 OFFICE O/P EST MOD 30 MIN: CPT | Performed by: INTERNAL MEDICINE

## 2019-02-04 RX ORDER — TAMSULOSIN HYDROCHLORIDE 0.4 MG/1
0.4 CAPSULE ORAL DAILY
Qty: 30 CAPSULE | Refills: 0 | Status: SHIPPED | OUTPATIENT
Start: 2019-02-04 | End: 2020-02-04

## 2019-02-04 RX ORDER — FINASTERIDE 5 MG/1
5 TABLET, FILM COATED ORAL DAILY
Qty: 30 TABLET | Refills: 0 | Status: SHIPPED | OUTPATIENT
Start: 2019-02-04 | End: 2019-02-20

## 2019-02-04 ASSESSMENT — PAIN SCALES - GENERAL: PAINLEVEL: NO PAIN (0)

## 2019-02-04 NOTE — PROGRESS NOTES
ANTICOAGULATION FOLLOW-UP CLINIC VISIT    Patient Name:  Kevin Partida  Date:  2019  Contact Type:  Face to Face    SUBJECTIVE:     Patient Findings     Positives:   No Problem Findings           OBJECTIVE    INR Protime   Date Value Ref Range Status   2019 2.0 (A) 0.86 - 1.14 Final       ASSESSMENT / PLAN  INR assessment THER    Recheck INR In: 4 WEEKS    INR Location Clinic      Anticoagulation Summary  As of 2019    INR goal:   2.0-3.0   TTR:   47.8 % (2.9 y)   INR used for dosin.0 (2019)   Warfarin maintenance plan:   7.5 mg (5 mg x 1.5) every Mon; 5 mg (5 mg x 1) all other days   Full warfarin instructions:   7.5 mg every Mon; 5 mg all other days   Weekly warfarin total:   37.5 mg   Plan last modified:   Diego Almanzar RN (2019)   Next INR check:   3/4/2019   Target end date:       Indications    Long term current use of anticoagulant therapy [Z79.01]             Anticoagulation Episode Summary     INR check location:       Preferred lab:       Send INR reminders to:   [facility]    Comments:   5 mg tabs, PM dose, print out, BP      Anticoagulation Care Providers     Provider Role Specialty Phone number    Taqueria Borrego MD [facility] [phone]            See the Encounter Report to view Anticoagulation Flowsheet and Dosing Calendar (Go to Encounters tab in chart review, and find the Anticoagulation Therapy Visit)    Dosage adjustment made based on physician directed care plan.    Diego Almanzar RN

## 2019-02-04 NOTE — PROGRESS NOTES
SUBJECTIVE:   Kevin Partida is a 83 year old male who presents to clinic today for the following health issues:      Chief Complaint   Patient presents with     Follow Up     Prostate issues                       Chief Complaint         The patient is a pleasant 83-year-old gentleman who presents today for follow-up of a recent prostatitis.  He denies any burning, fever or discomfort.  He does note that he has ongoing frequency, urgency, nocturia.  He notes that he does have hesitancy and decreased caliber.  We discussed this in detail.  He does have a history of coronary artery disease which is currently stable.  He is having no chest pain.  He is on long-term anticoagulation for his chronic atrial fibrillation denies any gross hematuria.  He is compliant with his medications including the Coreg, aspirin, ACE inhibitor, statin.  He is having no tolerance issues.                       PAST, FAMILY,SOCIAL HISTORY:     Medical  History:   has a past medical history of Atrial fibrillation (H) (04/14/07), Atrial flutter (H), Coronary artery disease, Headache(784.0), Hepatitis, unspecified, Hyperlipidaemia, Hypertension, Measles without mention of complication, Mumps without mention of complication, Orchitis and epididymitis, unspecified, Other and unspecified hyperlipidemia, Other emphysema (H), Other speech disturbance, Pneumonia, organism unspecified(486), Shortness of breath, Urinary obstruction, and Varicella without mention of complication.     Surgical History:   has a past surgical history that includes LAPAROSCOPY, SURGICAL; CHOLECYSTECTOMY (1998); REMOVAL OF TONSILS,<11 Y/O; Colonoscopy w/wo Pledger **Performed** (05/08/06); laminect/discectomy, lumbar (03/26/08); and Colonoscopy (9/13/2011).     Social History:   reports that he has quit smoking. His smoking use included cigarettes. He smoked 0.50 packs per day. he has never used smokeless tobacco. He reports that he drinks alcohol. He reports that he does  not use drugs.     Family History:  family history includes Alzheimer Disease in his mother; Cancer in his father; Cerebrovascular Disease in his mother and paternal uncle; Diabetes in his maternal aunt, mother, and another family member; EYE* in his mother; Genetic Disorder in his maternal grandmother; Heart Disease in his paternal uncle; Neurologic Disorder in his paternal uncle.            MEDICATIONS  Current Outpatient Medications   Medication Sig Dispense Refill     aspirin 81 MG tablet Take 1 tablet by mouth daily.       carvedilol (COREG) 12.5 MG tablet Take 1 tablet (12.5 mg) by mouth 2 times daily (with meals) 180 tablet 3     finasteride (PROSCAR) 5 MG tablet Take 1 tablet (5 mg) by mouth daily 30 tablet 0     furosemide (LASIX) 20 MG tablet Take 1 tablet (20 mg) by mouth daily 90 tablet 3     lisinopril (PRINIVIL/ZESTRIL) 10 MG tablet Take 1 tablet (10 mg) by mouth daily 90 tablet 0     simvastatin (ZOCOR) 80 MG tablet Take 1 tablet (80 mg) by mouth daily 90 tablet 1     tamsulosin (FLOMAX) 0.4 MG capsule Take 1 capsule (0.4 mg) by mouth daily 30 capsule 0     warfarin (JANTOVEN) 5 MG tablet Take 7.5 mg Sat and 5 mg all other days or as directed by the coumadin clinic. 105 tablet 0         --------------------------------------------------------------------------------------------------------------------                              Review of Systems       LUNGS: Pt denies: cough, excess sputum, hemoptysis, or shortness of breath.   HEART: Pt denies: chest pain, symptomatic arrhythmia, syncope, tachy or bradyarrhythmia.   GI: Pt denies: nausea, vomiting, diarrhea, constipation, melena, or hematochezia.   NEURO: Pt denies: seizures, strokes, diplopia, weakness, paraesthesias, or paralysis.   SKIN: Pt denies: itching, rashes, discoloration, or specific lesions of concern. Denies recent hair loss.   PSYCH: The patient denies significant depression, anxiety, mood imbalance. Specifically denies any suicidal  ideation.   URINARY: Pt acknowledges: frequency, urgency, minor incontinence, and hesitancy of urine. Denies hematuria, or flank pain.                                     Examination      /68 (BP Location: Right arm, Patient Position: Sitting, Cuff Size: Adult Regular)   Pulse 100   Temp 97.4  F (36.3  C) (Temporal)   Resp 14   Wt 107.5 kg (237 lb)   SpO2 93%   BMI 31.92 kg/m      Constitutional: The patient appears to be in no acute distress. The patient appears to be adequately hydrated. No acute respiratory or hemodynamic distress is noted at this time.   LUNGS: clear bilaterally, airflow is brisk, no intercostal retraction or stridor is noted. No coughing is noted during visit.   HEART:  regular without rubs, clicks, gallops, or murmurs. PMI is nondisplaced. Upstrokes are brisk. S1,S2 are heard.   GI: Abdomen is soft, without rebound, guarding or tenderness. Bowel sounds are appropriate. No renal bruits are heard.   NEURO: Pt is alert and appropriate. No neurologic lateralization is noted. Cranial nerves 2-12 are intact. Peripheral sensory and motor function are grossly normal.    URINARY: Jarrett's punch is negative. No suprapubic tenderness is noted with palpation.                                           Decision Making    1. Benign prostatic hyperplasia with urinary retention  Add Proscar and Flomax.   Watch blood pressure.  - finasteride (PROSCAR) 5 MG tablet; Take 1 tablet (5 mg) by mouth daily  Dispense: 30 tablet; Refill: 0  - tamsulosin (FLOMAX) 0.4 MG capsule; Take 1 capsule (0.4 mg) by mouth daily  Dispense: 30 capsule; Refill: 0    2. Coronary artery disease involving native coronary artery of native heart without angina pectoris  Currently stable, continue medications    3. Long term current use of anticoagulant therapy  Follow INR    4. Chronic atrial fibrillation (H)  Continue anticoagulation                         FOLLOW UP   I have asked the patient to make an appointment for  followup with me in 1 month        I have carefully explained the diagnosis and treatment options to the patient.  The patient has displayed an understanding of the above, and all subsequent questions were answered.      DO TETE Fierro    Portions of this note were produced using [employer]  Although every attempt at real-time proof reading has been made, occasional grammar/syntax errors may have been missed.

## 2019-02-15 DIAGNOSIS — I25.10 CORONARY ARTERY DISEASE INVOLVING NATIVE CORONARY ARTERY WITHOUT ANGINA PECTORIS: ICD-10-CM

## 2019-02-15 RX ORDER — CARVEDILOL 12.5 MG/1
12.5 TABLET ORAL 2 TIMES DAILY WITH MEALS
Qty: 180 TABLET | Refills: 0 | Status: SHIPPED | OUTPATIENT
Start: 2019-02-15 | End: 2019-05-13

## 2019-02-20 DIAGNOSIS — R33.8 BENIGN PROSTATIC HYPERPLASIA WITH URINARY RETENTION: ICD-10-CM

## 2019-02-20 DIAGNOSIS — N40.1 BENIGN PROSTATIC HYPERPLASIA WITH URINARY RETENTION: ICD-10-CM

## 2019-02-20 RX ORDER — FINASTERIDE 5 MG/1
TABLET, FILM COATED ORAL
Qty: 30 TABLET | Refills: 1 | Status: SHIPPED | OUTPATIENT
Start: 2019-02-20 | End: 2019-04-27

## 2019-02-20 NOTE — TELEPHONE ENCOUNTER
Prescription approved per [facility] Refill Protocol.    SHAISTA DanielleN, RN  [facility]

## 2019-02-20 NOTE — TELEPHONE ENCOUNTER
"Requested Prescriptions   Pending Prescriptions Disp Refills     finasteride (PROSCAR) 5 MG tablet [Pharmacy Med Name: FINASTERIDE 5MG TABS] 30 tablet 0    Last Written Prescription Date:  2/4/19  Last Fill Quantity: 30,  # refills: 0   Last office visit: 2/4/2019 with prescribing provider:  2/4/19   Future Office Visit:   Next 5 appointments (look out 90 days)    Mar 04, 2019 10:00 AM CST  [facility] Visit with [facility]ee DO  [facility] [address] [facility] [id number] [phone]        Sig: TAKE ONE TABLET BY MOUTH ONCE DAILY    Alpha Blockers Passed - 2/20/2019  3:21 PM       Passed - Blood pressure under 140/90 in past 12 months    BP Readings from Last 3 Encounters:   02/04/19 116/68   01/04/19 120/70   01/02/19 132/80                Passed - Recent (12 mo) or future (30 days) visit within the authorizing provider's specialty    Patient had office visit in the last 12 months or has a visit in the next 30 days with authorizing provider or within the authorizing provider's specialty.  See \"Patient Info\" tab in inbasket, or \"Choose Columns\" in Meds & Orders section of the refill encounter.             Passed - Patient does not have Tadalafil, Vardenafil, or Sildenafil on their medication list       Passed - Medication is active on med list       Passed - Patient is 18 years of age or older        "

## 2019-03-04 ENCOUNTER — ANTICOAGULATION THERAPY VISIT (OUTPATIENT)
Dept: ANTICOAGULATION | Facility: OTHER | Age: 84
End: 2019-03-04
Payer: COMMERCIAL

## 2019-03-04 DIAGNOSIS — Z79.01 LONG TERM CURRENT USE OF ANTICOAGULANT THERAPY: ICD-10-CM

## 2019-03-04 LAB — INR POINT OF CARE: 3.2 (ref 0.86–1.14)

## 2019-03-04 PROCEDURE — 85610 PROTHROMBIN TIME: CPT | Mod: QW

## 2019-03-04 PROCEDURE — 36416 COLLJ CAPILLARY BLOOD SPEC: CPT

## 2019-03-04 PROCEDURE — 99207 ZZC NO CHARGE NURSE ONLY: CPT

## 2019-03-04 NOTE — PROGRESS NOTES
ANTICOAGULATION FOLLOW-UP CLINIC VISIT    Patient Name:  Kevin Partida  Date:  3/4/2019  Contact Type:  Face to Face    SUBJECTIVE:     Patient Findings     Positives:   No Problem Findings           OBJECTIVE    INR Protime   Date Value Ref Range Status   03/04/2019 3.2 (A) 0.86 - 1.14 Final       ASSESSMENT / PLAN  INR assessment THER    Recheck INR In: 6 WEEKS    INR Location Clinic      Anticoagulation Summary  As of 3/4/2019    INR goal:   2.0-3.0   TTR:   48.7 % (2.9 y)   INR used for dosing:   3.2! (3/4/2019)   Warfarin maintenance plan:   7.5 mg (5 mg x 1.5) every Mon; 5 mg (5 mg x 1) all other days   Full warfarin instructions:   7.5 mg every Mon; 5 mg all other days   Weekly warfarin total:   37.5 mg   No change documented:   Diego Almanzar RN   Plan last modified:   Diego Almanzar RN (2/4/2019)   Next INR check:   4/15/2019   Target end date:       Indications    Long term current use of anticoagulant therapy [Z79.01]             Anticoagulation Episode Summary     INR check location:       Preferred lab:       Send INR reminders to:   HARRIS ROBIN    Comments:   5 mg tabs, PM dose, print out, BP      Anticoagulation Care Providers     Provider Role Specialty Phone number    Chad Gee DO [facility] [phone]            See the Encounter Report to view Anticoagulation Flowsheet and Dosing Calendar (Go to Encounters tab in chart review, and find the Anticoagulation Therapy Visit)    Dosage adjustment made based on physician directed care plan.    Diego Almanzar RN

## 2019-03-05 ENCOUNTER — OFFICE VISIT (OUTPATIENT)
Dept: FAMILY MEDICINE | Facility: OTHER | Age: 84
End: 2019-03-05
Payer: COMMERCIAL

## 2019-03-05 VITALS
DIASTOLIC BLOOD PRESSURE: 64 MMHG | WEIGHT: 242.19 LBS | HEART RATE: 73 BPM | SYSTOLIC BLOOD PRESSURE: 118 MMHG | TEMPERATURE: 97.4 F | BODY MASS INDEX: 32.62 KG/M2 | RESPIRATION RATE: 18 BRPM | OXYGEN SATURATION: 95 %

## 2019-03-05 DIAGNOSIS — I48.20 CHRONIC ATRIAL FIBRILLATION (H): Primary | ICD-10-CM

## 2019-03-05 DIAGNOSIS — I25.10 CORONARY ARTERY DISEASE INVOLVING NATIVE CORONARY ARTERY OF NATIVE HEART WITHOUT ANGINA PECTORIS: ICD-10-CM

## 2019-03-05 DIAGNOSIS — R35.1 BENIGN PROSTATIC HYPERPLASIA WITH NOCTURIA: ICD-10-CM

## 2019-03-05 DIAGNOSIS — N40.1 BENIGN PROSTATIC HYPERPLASIA WITH NOCTURIA: ICD-10-CM

## 2019-03-05 DIAGNOSIS — I10 HYPERTENSION GOAL BP (BLOOD PRESSURE) < 140/90: ICD-10-CM

## 2019-03-05 PROCEDURE — 99214 OFFICE O/P EST MOD 30 MIN: CPT | Performed by: INTERNAL MEDICINE

## 2019-03-05 ASSESSMENT — PAIN SCALES - GENERAL: PAINLEVEL: NO PAIN (0)

## 2019-03-05 NOTE — PROGRESS NOTES
SUBJECTIVE:   Kevin Partida is a 84 yearold male who presents to clinic today for the following health issues:      Patient is here today to follow up Prostate       Amount of exercise or physical activity: None    Problems taking medications regularly: No    Medication side effects: Loss of hair , hot flashes     Diet: regular (no restrictions)                          Chief Complaint         The patient presents today for follow-up of his benign prostatic hypertrophy and bladder outlet obstruction.  He is taking the Proscar and Flomax without difficulty and notes that they are doing quite well.  He gets up 3 times nightly but notes that this is generally to feed the fireplace.  He does urinate when he is up and notes that these are normal volume, normal caliber urinations.  He does have chronic atrial fibrillation and continues the anticoagulation and has had no bruising or bleeding.  Has a history of coronary artery disease and hypertension which are controlled and he takes his medications compliantly.                         PAST, FAMILY,SOCIAL HISTORY:     Medical  History:   has a past medical history of Atrial fibrillation (H) (04/14/07), Atrial flutter (H), Coronary artery disease, Headache(784.0), Hepatitis, unspecified, Hyperlipidaemia, Hypertension, Measles without mention of complication, Mumps without mention of complication, Orchitis and epididymitis, unspecified, Other and unspecified hyperlipidemia, Other emphysema (H), Other speech disturbance, Pneumonia, organism unspecified(486), Shortness of breath, Urinary obstruction, and Varicella without mention of complication.     Surgical History:   has a past surgical history that includes LAPAROSCOPY, SURGICAL; CHOLECYSTECTOMY (1998); REMOVAL OF TONSILS,<13 Y/O; Colonoscopy w/wo [place] **Performed** (05/08/06); laminect/discectomy, lumbar (03/26/08); and Colonoscopy (9/13/2011).     Social History:   reports that he has quit smoking. His smoking use  included cigarettes. He smoked 0.50 packs per day. he has never used smokeless tobacco. He reports that he drinks alcohol. He reports that he does not use drugs.     Family History:  family history includes Alzheimer Disease in his mother; Cancer in his father; Cerebrovascular Disease in his mother and paternal uncle; Diabetes in his maternal aunt, mother, and another family member; EYE* in his mother; Genetic Disorder in his maternal grandmother; Heart Disease in his paternal uncle; Neurologic Disorder in his paternal uncle.            MEDICATIONS  Current Outpatient Medications   Medication Sig Dispense Refill     aspirin 81 MG tablet Take 1 tablet by mouth daily.       carvedilol (COREG) 12.5 MG tablet Take 1 tablet (12.5 mg) by mouth 2 times daily (with meals) 180 tablet 0     finasteride (PROSCAR) 5 MG tablet TAKE ONE TABLET BY MOUTH ONCE DAILY 30 tablet 1     furosemide (LASIX) 20 MG tablet Take 1 tablet (20 mg) by mouth daily 90 tablet 3     lisinopril (PRINIVIL/ZESTRIL) 10 MG tablet Take 1 tablet (10 mg) by mouth daily 90 tablet 0     simvastatin (ZOCOR) 80 MG tablet Take 1 tablet (80 mg) by mouth daily 90 tablet 1     tamsulosin (FLOMAX) 0.4 MG capsule Take 1 capsule (0.4 mg) by mouth daily 30 capsule 0     warfarin (JANTOVEN) 5 MG tablet Take 7.5 mg Sat and 5 mg all other days or as directed by the coumadin clinic. 105 tablet 0         --------------------------------------------------------------------------------------------------------------------                              Review of Systems       LUNGS: Pt denies: cough, excess sputum, hemoptysis, or shortness of breath.   HEART: Pt denies: chest pain, symptomatic arrhythmia, syncope, tachy or bradyarrhythmia.   GI: Pt denies: nausea, vomiting, diarrhea, constipation, melena, or hematochezia.   NEURO: Pt denies: seizures, strokes, diplopia, weakness, paraesthesias, or paralysis.   SKIN: Pt denies: itching, rashes, discoloration, or specific lesions  of concern. Denies recent hair loss.   PSYCH: The patient denies significant depression, anxiety, mood imbalance. Specifically denies any suicidal ideation.                                     Examination      /64 (BP Location: Left arm, Patient Position: Sitting, Cuff Size: Adult Large)   Pulse 73   Temp 97.4  F (36.3  C) (Temporal)   Resp 18   Wt 109.9 kg (242 lb 3 oz)   SpO2 95%   BMI 32.62 kg/m      Constitutional: The patient appears to be in no acute distress. The patient appears to be adequately hydrated. No acute respiratory or hemodynamic distress is noted at this time.   LUNGS: clear bilaterally, airflow is brisk, no intercostal retraction or stridor is noted. No coughing is noted during visit.   HEART: Irregularly irregular without rubs, clicks, gallops, or murmurs. PMI is nondisplaced. Upstrokes are brisk. S1,S2 are heard.   GI: Abdomen is soft, without rebound, guarding or tenderness. Bowel sounds are appropriate. No renal bruits are heard.   NEURO: Pt is alert and appropriate. No neurologic lateralization is noted. Cranial nerves 2-12 are intact. Peripheral sensory and motor function are grossly normal.    SKIN:  warm and dry. No erythema, or rashes are noted. No specific lesions of concern are noted.    PSYCH: The patient appears grossly appropriate. Maintains good eye contact, does not have any jittery or atypical motion. Displays appropriate affect.                                           Decision Making  1. Benign prostatic hyperplasia with nocturia  Continue current medication    2. Chronic atrial fibrillation (H)  Continue INR monitoring and anticoagulation  - INR CLINIC REFERRAL    3. Hypertension goal BP (blood pressure) < 140/90  Currently controlled on medication    4. Coronary artery disease involving native coronary artery of native heart without angina pectoris  Chest pain-free, can follow-up with cardiology as scheduled.                           FOLLOW UP   I have asked the  patient to make an appointment for followup with me in 4 months        I have carefully explained the diagnosis and treatment options to the patient.  The patient has displayed an understanding of the above, and all subsequent questions were answered.      DO TETE Fierro    Portions of this note were produced using [employer]  Although every attempt at real-time proof reading has been made, occasional grammar/syntax errors may have been missed.

## 2019-03-18 ENCOUNTER — HOSPITAL ENCOUNTER (OUTPATIENT)
Dept: CARDIOLOGY | Facility: CLINIC | Age: 84
Discharge: HOME | End: 2019-03-18
Attending: INTERNAL MEDICINE | Admitting: INTERNAL MEDICINE
Payer: MEDICARE

## 2019-03-18 DIAGNOSIS — I25.5 ISCHEMIC CARDIOMYOPATHY: ICD-10-CM

## 2019-03-18 DIAGNOSIS — E78.5 HYPERLIPIDEMIA LDL GOAL <70: ICD-10-CM

## 2019-03-18 LAB
ALT SERPL W P-5'-P-CCNC: 19 U/L (ref 0–70)
ANION GAP SERPL CALCULATED.3IONS-SCNC: 4 MMOL/L (ref 3–14)
BUN SERPL-MCNC: 18 MG/DL (ref 7–30)
CALCIUM SERPL-MCNC: 8.6 MG/DL (ref 8.5–10.1)
CHLORIDE SERPL-SCNC: 109 MMOL/L (ref 94–109)
CHOLEST SERPL-MCNC: 134 MG/DL
CO2 SERPL-SCNC: 29 MMOL/L (ref 20–32)
CREAT SERPL-MCNC: 1.14 MG/DL (ref 0.66–1.25)
GFR SERPL CREATININE-BSD FRML MDRD: 59 ML/MIN/{1.73_M2}
GLUCOSE SERPL-MCNC: 118 MG/DL (ref 70–99)
HDLC SERPL-MCNC: 37 MG/DL
LDLC SERPL CALC-MCNC: 59 MG/DL
NONHDLC SERPL-MCNC: 97 MG/DL
POTASSIUM SERPL-SCNC: 4.4 MMOL/L (ref 3.4–5.3)
SODIUM SERPL-SCNC: 142 MMOL/L (ref 133–144)
TRIGL SERPL-MCNC: 190 MG/DL

## 2019-03-18 PROCEDURE — 93306 TTE W/DOPPLER COMPLETE: CPT | Mod: 26 | Performed by: INTERNAL MEDICINE

## 2019-03-18 PROCEDURE — 84460 ALANINE AMINO (ALT) (SGPT): CPT | Performed by: INTERNAL MEDICINE

## 2019-03-18 PROCEDURE — 80048 BASIC METABOLIC PNL TOTAL CA: CPT | Performed by: INTERNAL MEDICINE

## 2019-03-18 PROCEDURE — 40000264 ECHOCARDIOGRAM COMPLETE

## 2019-03-18 PROCEDURE — 80061 LIPID PANEL: CPT | Performed by: INTERNAL MEDICINE

## 2019-03-18 PROCEDURE — 36415 COLL VENOUS BLD VENIPUNCTURE: CPT | Performed by: INTERNAL MEDICINE

## 2019-03-18 PROCEDURE — 25500064 ZZH RX 255 OP 636: Performed by: INTERNAL MEDICINE

## 2019-03-18 RX ADMIN — HUMAN ALBUMIN MICROSPHERES AND PERFLUTREN 2 ML: 10; .22 INJECTION, SOLUTION INTRAVENOUS at 09:41

## 2019-03-26 NOTE — PROGRESS NOTES
"Cardiology Clinic Progress Note  Kevin Partida MRN# [id number]   YOB: 1935 Age: 84 year old          Assessment and Plan:     1. Ischemic cardiomyopathy    Previously refused ICD    LVEF 22-26% (unchanged)    The echocardiogram was read as \"increased echogenicity of LV apex, cannot exclude prominent trabeculation/Non compaction (doubt)/non mobile thrombus. By direct comparison to echo 3/19/18 appearance is identicle which makes clot less likely\"    Message sent to Dr. Platt for review.    Continue carvedilol, lisinopril and furosemide    If no further imaging needed, follow up in 1 year with repeat echocardiogram    2. Chronic atrial fibrillation    On chronic Coumadin for CVA prophylaxis    Rate controlled with carvedilol     3. Coronary artery disease    Status post stenting to the left circumflex in 2011    No recurrent chest discomfort    Continue aspirin and statin    4. Hypertension    Well controlled on carvedilol 12.5 mg twice daily and lisinopril 10 mg daily    5. Hyperlipidemia    Most recent fasting lipids from March 18, 2019 showed total cholesterol 134, HDL 37, LDL 59 and triglycerides 190    Continue simvastatin 80 mg daily         History of Presenting Illness:    Kevin Partida is a very pleasant 84 year old patient of Dr. Platt who presents today for an annual follow-up.  He has a past medical history notable for coronary artery disease (status post stenting of the left circumflex in 2011), ischemic cardiomyopathy (previously refused prophylactic ICD), hyperlipidemia, hypertension and chronic atrial fibrillation (rate control strategy was CVA prophylaxis)    His LV dysfunction was initially noted in 2011 and was estimated around 30%.  He subsequently underwent coronary artery angiography that showed severe disease of the circumflex that was treated with stenting.  Unfortunately, his LV function did not improve with revascularization and it is remained depressed between 25 and " "35%.    His most recent echocardiogram from March 2019 revealed his LVEF was 20-26%.  The echo was read as there was \"increased echogenicity of LV apex, cannot exclude prominant trabeculation/Non compaction (doubt)/non mobile thrombus. By direct comparison to echo 3/19/18 appearance is identicle which makes clot less likely\"    His INR is remain therapeutic and he has no significant symptoms aggressive shortness of breath, chest discomfort, lower extremity edema, PND orthopnea or any strokelike symptoms.               Review of Systems:   Review of Systems:  Skin:  Positive for bruising   Eyes:  Positive for glasses  ENT:  Negative    Respiratory:  Positive for dyspnea on exertion;cough  Cardiovascular:  Negative for;palpitations;chest pain;edema Positive for;lightheadedness;dizziness  Gastroenterology: Negative    Genitourinary:  Negative    Musculoskeletal:  Negative    Neurologic:  Negative    Psychiatric:  Negative    Heme/Lymph/Imm:  Positive for allergies          Physical Exam:     Vitals: /72 (BP Location: Right arm, Patient Position: Fowlers, Cuff Size: Adult Large)   Pulse 70   Ht 1.835 m (6' 0.25\")   Wt 108.6 kg (239 lb 6.4 oz)   SpO2 94%   BMI 32.24 kg/m    Constitutional:  cooperative, alert and oriented, well developed, well nourished, in no acute distress        Skin:  warm and dry to the touch   (cyanotic nose)    Head:  normocephalic        Eyes:  pupils equal and round;conjunctivae and lids unremarkable(wears glasses)        ENT:  no pallor or cyanosis, dentition good        Neck:  no stiffness        Chest:  clear to auscultation prolonged expiration;expiratory wheezes;diminished breath sounds bilaterally      Cardiac:   irregularly irregular rhythm   no presence of murmur            Abdomen:    obese      Vascular: pulses full and equal                                      Extremities and Back:       trace BLE edema    Neurological:  no gross motor deficits;affect appropriate           "     Medications:     Current Outpatient Medications   Medication Sig Dispense Refill     aspirin 81 MG tablet Take 1 tablet by mouth daily.       carvedilol (COREG) 12.5 MG tablet Take 1 tablet (12.5 mg) by mouth 2 times daily (with meals) 180 tablet 0     finasteride (PROSCAR) 5 MG tablet TAKE ONE TABLET BY MOUTH ONCE DAILY 30 tablet 1     furosemide (LASIX) 20 MG tablet Take 1 tablet (20 mg) by mouth daily 90 tablet 3     lisinopril (PRINIVIL/ZESTRIL) 10 MG tablet Take 1 tablet (10 mg) by mouth daily 90 tablet 3     simvastatin (ZOCOR) 80 MG tablet Take 1 tablet (80 mg) by mouth daily 90 tablet 3     tamsulosin (FLOMAX) 0.4 MG capsule Take 1 capsule (0.4 mg) by mouth daily 30 capsule 0     warfarin (JANTOVEN) 5 MG tablet Take 7.5 mg Sat and 5 mg all other days or as directed by the coumadin clinic. 105 tablet 0       Family History   Problem Relation Age of Onset     Alzheimer Disease Mother         ?dimentia or alzheimers     Diabetes Mother         insulin     EYE* Mother         cataract     Cerebrovascular Disease Mother      Cancer Father         lymph tumor of glands     Diabetes Maternal Aunt         ? oral or insulin     Diabetes Other         4 cousins insulin dependent     Genetic Disorder Maternal Grandmother         tremors     Heart Disease Paternal Uncle         ? at age 60-70     Neurologic Disorder Paternal Uncle         parkinsons     Cerebrovascular Disease Paternal Uncle         ? dued at age 60-70       Social History     Socioeconomic History     Marital status:      Spouse name: Irlanda     Number of children: 2     Years of education: 12+     Highest education level: Not on file   Occupational History     Occupation: retired telephone tech     Employer: [employer]   Social Needs     Financial resource strain: Not on file     Food insecurity:     Worry: Not on file     Inability: Not on file     Transportation needs:     Medical: Not on file     Non-medical: Not on file    Tobacco Use     Smoking status: Former Smoker     Packs/day: 0.50     Types: Cigarettes     Smokeless tobacco: Never Used     Tobacco comment: smoked for 57 years- since age 9   Substance and Sexual Activity     Alcohol use: Yes     Alcohol/week: 0.0 oz     Comment: socially     Drug use: No     Sexual activity: No   Lifestyle     Physical activity:     Days per week: Not on file     Minutes per session: Not on file     Stress: Not on file   Relationships     Social connections:     Talks on phone: Not on file     Gets together: Not on file     Attends Zoroastrian service: Not on file     Active member of club or organization: Not on file     Attends meetings of clubs or organizations: Not on file     Relationship status: Not on file     Intimate partner violence:     Fear of current or ex partner: Not on file     Emotionally abused: Not on file     Physically abused: Not on file     Forced sexual activity: Not on file   Other Topics Concern      Service Yes     Comment: [employer] x 8 yrs     Blood Transfusions No     Caffeine Concern No     Comment: 6 cups coffee/day     Occupational Exposure No     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 Yes   Social History Narrative     Not on file            Past Medical History:     Past Medical History:   Diagnosis Date     Atrial fibrillation (H) 04/14/07    Admit. Discharged 04/15/07     Atrial flutter (H)      Coronary artery disease      Headache(784.0)      Hepatitis, unspecified      Hyperlipidaemia      Hypertension      Measles without mention of complication      Measles     Mumps without mention of complication     Mumps     Orchitis and epididymitis, unspecified      Other and unspecified hyperlipidemia      Other emphysema (H)      Other speech disturbance     Stuttering     Pneumonia, organism unspecified(486)     Pneumonia     Shortness of breath      Urinary obstruction      Varicella without mention of complication     Chickenpox              Past Surgical History:     Past Surgical History:   Procedure Laterality Date     COLONOSCOPY  9/13/2011    Procedure:COLONOSCOPY; colonoscopy; Surgeon:IVETH WIGGINS; Location:[facility]     HC COLONOSCOPY W/WO BRUSH/WASH  05/08/06     [facility] LAPAROSCOPY, SURGICAL; CHOLECYSTECTOMY  1998    Cholecystectomy, Laparoscopic     HC REMOVAL OF TONSILS,<11 Y/O      Tonsils <12y.o.     LAMINECT/DISCECTOMY, LUMBAR  03/26/08    Right L4-L5 microlumbar diskectomy.              Allergies:   Codeine and Niacin       Data:   All laboratory data reviewed:    Recent Labs   Lab Test 03/18/19 0817 04/26/18 04/04/17  0822 07/19/16  1026  10/30/12  0932   LDL 59 78.0 71 82   < > 88   HDL 37* 40 40 43   < > 35*   NHDL 97  --  92 108  --   --    CHOL 134 145 132 151   < > 152   TRIG 190* 137 107 131   < > 150   TSH  --   --   --   --   --  2.59    < > = values in this interval not displayed.       Lab Results   Component Value Date    WBC 7.9 12/12/2018    RBC 5.18 12/12/2018    HGB 16.1 12/12/2018    HCT 49.7 12/12/2018    MCV 96 12/12/2018    MCH 31.1 12/12/2018    MCHC 32.4 12/12/2018    RDW 13.7 12/12/2018     12/12/2018       Lab Results   Component Value Date     03/18/2019    POTASSIUM 4.4 03/18/2019    CHLORIDE 109 03/18/2019    CO2 29 03/18/2019    ANIONGAP 4 03/18/2019     (H) 03/18/2019    BUN 18 03/18/2019    CR 1.14 03/18/2019    GFRESTIMATED 59 (L) 03/18/2019    GFRESTBLACK 68 03/18/2019    JO-ANN 8.6 03/18/2019      Lab Results   Component Value Date    AST 27 04/26/2018    ALT 19 03/18/2019       Lab Results   Component  Value Date    A1C 5.9 02/06/2014       Lab Results   Component Value Date    INR 3.2 (A) 03/04/2019    INR 2.0 (A) 02/04/2019    INR 2.67 (H) 12/20/2018    INR 2.66 (H) 09/28/2016         MARIELY MORTON, CNP  [facility]

## 2019-03-27 ENCOUNTER — TEAM CONFERENCE (OUTPATIENT)
Dept: CARDIOLOGY | Facility: CLINIC | Age: 84
End: 2019-03-27

## 2019-03-27 ENCOUNTER — OFFICE VISIT (OUTPATIENT)
Dept: CARDIOLOGY | Facility: CLINIC | Age: 84
End: 2019-03-27
Payer: COMMERCIAL

## 2019-03-27 VITALS
BODY MASS INDEX: 32.43 KG/M2 | HEART RATE: 70 BPM | HEIGHT: 72 IN | OXYGEN SATURATION: 94 % | DIASTOLIC BLOOD PRESSURE: 72 MMHG | SYSTOLIC BLOOD PRESSURE: 116 MMHG | WEIGHT: 239.4 LBS

## 2019-03-27 DIAGNOSIS — E78.5 HYPERLIPIDEMIA LDL GOAL <70: ICD-10-CM

## 2019-03-27 DIAGNOSIS — I25.5 ISCHEMIC CARDIOMYOPATHY: ICD-10-CM

## 2019-03-27 DIAGNOSIS — I25.10 CORONARY ARTERY DISEASE INVOLVING NATIVE CORONARY ARTERY OF NATIVE HEART WITHOUT ANGINA PECTORIS: ICD-10-CM

## 2019-03-27 PROCEDURE — 99214 OFFICE O/P EST MOD 30 MIN: CPT | Performed by: NURSE PRACTITIONER

## 2019-03-27 RX ORDER — SIMVASTATIN 80 MG
80 TABLET ORAL DAILY
Qty: 90 TABLET | Refills: 3 | Status: SHIPPED | OUTPATIENT
Start: 2019-03-27 | End: 2020-05-04

## 2019-03-27 RX ORDER — LISINOPRIL 10 MG/1
10 TABLET ORAL DAILY
Qty: 90 TABLET | Refills: 3 | Status: SHIPPED | OUTPATIENT
Start: 2019-03-27 | End: 2020-04-17

## 2019-03-27 RX ORDER — FUROSEMIDE 20 MG
20 TABLET ORAL DAILY
Qty: 90 TABLET | Refills: 3 | Status: SHIPPED | OUTPATIENT
Start: 2019-03-27 | End: 2020-03-27

## 2019-03-27 ASSESSMENT — MIFFLIN-ST. JEOR: SCORE: 1817.88

## 2019-03-27 NOTE — TELEPHONE ENCOUNTER
Dr. Platt-     Can you please review the echocardiogram of Mr. Partida? It was read as:     Increased echogenicity of LV apex, cannot exclude prominant trabeculation/Non  compaction (doubt)/non mobile thrombus. By dfirect comparison to echo 3/19/18  appearance is identicle which makes clot less likely.       I did not order any advanced imaging as the patient is on Warfarin and has no symptoms. Could you please review and let me know if a cardiac MRI or JOSE should be completed?       Thanks    Niki Farris, APRN, CNP

## 2019-03-27 NOTE — LETTER
"3/27/2019    Chad Gee, DO  [address] [place] [address]    RE: Kevin Partida       Dear Colleague,    I had the pleasure of seeing Kevin Partida in the [facility].    Cardiology Clinic Progress Note  Kevin Partida MRN# [id number]   YOB: 1935 Age: 84 year old          Assessment and Plan:     1. Ischemic cardiomyopathy    Previously refused ICD    LVEF 22-26% (unchanged)    The echocardiogram was read as \"increased echogenicity of LV apex, cannot exclude prominent trabeculation/Non compaction (doubt)/non mobile thrombus. By direct comparison to echo 3/19/18 appearance is identicle which makes clot less likely\"    Message sent to Dr. Platt for review.    Continue carvedilol, lisinopril and furosemide    If no further imaging needed, follow up in 1 year with repeat echocardiogram    2. Chronic atrial fibrillation    On chronic Coumadin for CVA prophylaxis    Rate controlled with carvedilol     3. Coronary artery disease    Status post stenting to the left circumflex in 2011    No recurrent chest discomfort    Continue aspirin and statin    4. Hypertension    Well controlled on carvedilol 12.5 mg twice daily and lisinopril 10 mg daily    5. Hyperlipidemia    Most recent fasting lipids from March 18, 2019 showed total cholesterol 134, HDL 37, LDL 59 and triglycerides 190    Continue simvastatin 80 mg daily         History of Presenting Illness:    Kevin Partida is a very pleasant 84 year old patient of Dr. Platt who presents today for an annual follow-up.  He has a past medical history notable for coronary artery disease (status post stenting of the left circumflex in 2011), ischemic cardiomyopathy (previously refused prophylactic ICD), hyperlipidemia, hypertension and chronic atrial fibrillation (rate control strategy was CVA prophylaxis)    His LV dysfunction was initially noted in 2011 and was estimated around 30%.  He subsequently " "underwent coronary artery angiography that showed severe disease of the circumflex that was treated with stenting.  Unfortunately, his LV function did not improve with revascularization and it is remained depressed between 25 and 35%.    His most recent echocardiogram from March 2019 revealed his LVEF was 20-26%.  The echo was read as there was \"increased echogenicity of LV apex, cannot exclude prominant trabeculation/Non compaction (doubt)/non mobile thrombus. By direct comparison to echo 3/19/18 appearance is identicle which makes clot less likely\"    His INR is remain therapeutic and he has no significant symptoms aggressive shortness of breath, chest discomfort, lower extremity edema, PND orthopnea or any strokelike symptoms.               Review of Systems:   Review of Systems:  Skin:  Positive for bruising   Eyes:  Positive for glasses  ENT:  Negative    Respiratory:  Positive for dyspnea on exertion;cough  Cardiovascular:  Negative for;palpitations;chest pain;edema Positive for;lightheadedness;dizziness  Gastroenterology: Negative    Genitourinary:  Negative    Musculoskeletal:  Negative    Neurologic:  Negative    Psychiatric:  Negative    Heme/Lymph/Imm:  Positive for allergies          Physical Exam:     Vitals: /72 (BP Location: Right arm, Patient Position: Fowlers, Cuff Size: Adult Large)   Pulse 70   Ht 1.835 m (6' 0.25\")   Wt 108.6 kg (239 lb 6.4 oz)   SpO2 94%   BMI 32.24 kg/m     Constitutional:  cooperative, alert and oriented, well developed, well nourished, in no acute distress        Skin:  warm and dry to the touch   (cyanotic nose)    Head:  normocephalic        Eyes:  pupils equal and round;conjunctivae and lids unremarkable(wears glasses)        ENT:  no pallor or cyanosis, dentition good        Neck:  no stiffness        Chest:  clear to auscultation prolonged expiration;expiratory wheezes;diminished breath sounds bilaterally      Cardiac:   irregularly irregular rhythm   no " presence of murmur            Abdomen:    obese      Vascular: pulses full and equal                                      Extremities and Back:       trace BLE edema    Neurological:  no gross motor deficits;affect appropriate               Medications:     Current Outpatient Medications   Medication Sig Dispense Refill     aspirin 81 MG tablet Take 1 tablet by mouth daily.       carvedilol (COREG) 12.5 MG tablet Take 1 tablet (12.5 mg) by mouth 2 times daily (with meals) 180 tablet 0     finasteride (PROSCAR) 5 MG tablet TAKE ONE TABLET BY MOUTH ONCE DAILY 30 tablet 1     furosemide (LASIX) 20 MG tablet Take 1 tablet (20 mg) by mouth daily 90 tablet 3     lisinopril (PRINIVIL/ZESTRIL) 10 MG tablet Take 1 tablet (10 mg) by mouth daily 90 tablet 3     simvastatin (ZOCOR) 80 MG tablet Take 1 tablet (80 mg) by mouth daily 90 tablet 3     tamsulosin (FLOMAX) 0.4 MG capsule Take 1 capsule (0.4 mg) by mouth daily 30 capsule 0     warfarin (JANTOVEN) 5 MG tablet Take 7.5 mg Sat and 5 mg all other days or as directed by the coumadin clinic. 105 tablet 0       Family History   Problem Relation Age of Onset     Alzheimer Disease Mother         ?dimentia or alzheimers     Diabetes Mother         insulin     EYE* Mother         cataract     Cerebrovascular Disease Mother      Cancer Father         lymph tumor of glands     Diabetes Maternal Aunt         ? oral or insulin     Diabetes Other         4 cousins insulin dependent     Genetic Disorder Maternal Grandmother         tremors     Heart Disease Paternal Uncle         ? at age 60-70     Neurologic Disorder Paternal Uncle         parkinsons     Cerebrovascular Disease Paternal Uncle         ? 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      Employer: [employer]   Social Needs     Financial resource strain: Not on file     Food insecurity:     Worry: Not on file     Inability: Not on file     Transportation needs:     Medical: Not on file     Non-medical: Not on file   Tobacco Use     Smoking status: Former Smoker     Packs/day: 0.50     Types: Cigarettes     Smokeless tobacco: Never Used     Tobacco comment: smoked for 57 years- since age 9   Substance and Sexual Activity     Alcohol use: Yes     Alcohol/week: 0.0 oz     Comment: socially     Drug use: No     Sexual activity: No   Lifestyle     Physical activity:     Days per week: Not on file     Minutes per session: Not on file     Stress: Not on file   Relationships     Social connections:     Talks on phone: Not on file     Gets together: Not on file     Attends Worship service: Not on file     Active member of club or organization: Not on file     Attends meetings of clubs or organizations: Not on file     Relationship status: Not on file     Intimate partner violence:     Fear of current or ex partner: Not on file     Emotionally abused: Not on file     Physically abused: Not on file     Forced sexual activity: Not on file   Other Topics Concern      Service Yes     Comment: [employer] x 8 yrs     Blood Transfusions No     Caffeine Concern No     Comment: 6 cups coffee/day     Occupational Exposure No     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 Yes   Social History Narrative     Not on file            Past Medical History:     Past Medical History:   Diagnosis Date     Atrial  fibrillation (H) 04/14/07    Admit. Discharged 04/15/07     Atrial flutter (H)      Coronary artery disease      Headache(784.0)      Hepatitis, unspecified      Hyperlipidaemia      Hypertension      Measles without mention of complication     Measles     Mumps without mention of complication     Mumps     Orchitis and epididymitis, unspecified      Other and unspecified hyperlipidemia      Other emphysema (H)      Other speech disturbance     Stuttering     Pneumonia, organism unspecified(486)     Pneumonia     Shortness of breath      Urinary obstruction      Varicella without mention of complication     Chickenpox              Past Surgical History:     Past Surgical History:   Procedure Laterality Date     COLONOSCOPY  9/13/2011    Procedure:COLONOSCOPY; colonoscopy; Surgeon:IVETH WIGGINS; Location: GI     HC COLONOSCOPY W/WO BRUSH/WASH  05/08/06     HC LAPAROSCOPY, SURGICAL; CHOLECYSTECTOMY  1998    Cholecystectomy, Laparoscopic     HC REMOVAL OF TONSILS,<11 Y/O      Tonsils <12y.o.     LAMINECT/DISCECTOMY, LUMBAR  03/26/08    Right L4-L5 microlumbar diskectomy.              Allergies:   Codeine and Niacin       Data:   All laboratory data reviewed:    Recent Labs   Lab Test 03/18/19  0817 04/26/18 04/04/17  0822 07/19/16  1026  10/30/12  0932   LDL 59 78.0 71 82   < > 88   HDL 37* 40 40 43   < > 35*   NHDL 97  --  92 108  --   --    CHOL 134 145 132 151   < > 152   TRIG 190* 137 107 131   < > 150   TSH  --   --   --   --   --  2.59    < > = values in this interval not displayed.       Lab Results   Component Value Date    WBC 7.9 12/12/2018    RBC 5.18 12/12/2018    HGB 16.1 12/12/2018    HCT 49.7 12/12/2018    MCV 96 12/12/2018    MCH 31.1 12/12/2018    MCHC 32.4 12/12/2018    RDW 13.7 12/12/2018     12/12/2018       Lab Results   Component Value Date     03/18/2019    POTASSIUM 4.4 03/18/2019    CHLORIDE 109 03/18/2019    CO2 29 03/18/2019    ANIONGAP 4 03/18/2019     (H) 03/18/2019     BUN 18 03/18/2019    CR 1.14 03/18/2019    GFRESTIMATED 59 (L) 03/18/2019    GFRESTBLACK 68 03/18/2019    JO-ANN 8.6 03/18/2019      Lab Results   Component Value Date    AST 27 04/26/2018    ALT 19 03/18/2019       Lab Results   Component Value Date    A1C 5.9 02/06/2014     Lab Results   Component Value Date    INR 3.2 (A) 03/04/2019    INR 2.0 (A) 02/04/2019    INR 2.67 (H) 12/20/2018    INR 2.66 (H) 09/28/2016     Thank you for allowing me to participate in the care of your patient.    Sincerely,     MARIELY MORTON [facility]

## 2019-03-27 NOTE — PATIENT INSTRUCTIONS
TODAY'S RECOMMENDATIONS    1. Will review with Dr. Platt regarding echocardiogram   2. Continue all present medications  3. Follow up in 1 year     If you have questions or concerns please call clinic at [phone].    Please call [phone] for scheduling.      It was a pleasure seeing you today!

## 2019-03-29 NOTE — TELEPHONE ENCOUNTER
An MRI or JOSE would have no clinical value at this point and would not . I would not perform additional evaluation. EE

## 2019-04-02 NOTE — TELEPHONE ENCOUNTER
Patient returned call, gave message from Niki Farris NP that no further imaging needs to be done at this time. They will follow up in 1 year with a repeat echocardiogram.

## 2019-04-02 NOTE — TELEPHONE ENCOUNTER
Can you please update the patient that there is no additional imaging needed at this time? The plan is for follow up in 1 year with repeat echocardiogram.     MARIELY Kingston, CNP

## 2019-04-12 DIAGNOSIS — I48.20 CHRONIC ATRIAL FIBRILLATION (H): ICD-10-CM

## 2019-04-12 RX ORDER — WARFARIN SODIUM 5 MG/1
TABLET ORAL
Qty: 95 TABLET | Refills: 0 | Status: SHIPPED | OUTPATIENT
Start: 2019-04-12 | End: 2019-07-03

## 2019-04-15 ENCOUNTER — ANTICOAGULATION THERAPY VISIT (OUTPATIENT)
Dept: ANTICOAGULATION | Facility: OTHER | Age: 84
End: 2019-04-15
Payer: COMMERCIAL

## 2019-04-15 DIAGNOSIS — Z79.01 LONG TERM CURRENT USE OF ANTICOAGULANT THERAPY: ICD-10-CM

## 2019-04-15 LAB — INR POINT OF CARE: 2.6 (ref 0.86–1.14)

## 2019-04-15 PROCEDURE — 36416 COLLJ CAPILLARY BLOOD SPEC: CPT

## 2019-04-15 PROCEDURE — 85610 PROTHROMBIN TIME: CPT | Mod: QW

## 2019-04-15 PROCEDURE — 99207 ZZC NO CHARGE NURSE ONLY: CPT

## 2019-04-15 NOTE — PROGRESS NOTES
ANTICOAGULATION FOLLOW-UP CLINIC VISIT    Patient Name:  Kevin Partida  Date:  4/15/2019  Contact Type:  Face to Face    SUBJECTIVE:     Patient Findings     Comments:   The patient was assessed for diet, medication, and activity level changes, missed or extra doses, bruising or bleeding, with no problem findings.             OBJECTIVE    INR Protime   Date Value Ref Range Status   04/15/2019 2.6 (A) 0.86 - 1.14 Final       ASSESSMENT / PLAN  INR assessment THER    Recheck INR In: 7 WEEKS    INR Location Clinic      Anticoagulation Summary  As of 4/15/2019    INR goal:   2.0-3.0   TTR:   49.4 % (3.1 y)   INR used for dosin.6 (4/15/2019)   Warfarin maintenance plan:   7.5 mg (5 mg x 1.5) every Mon; 5 mg (5 mg x 1) all other days   Full warfarin instructions:   7.5 mg every Mon; 5 mg all other days   Weekly warfarin total:   37.5 mg   No change documented:   Diego Almanzar RN   Plan last modified:   Diego Almanzar RN (2019)   Next INR check:   6/3/2019   Target end date:       Indications    Long term current use of anticoagulant therapy [Z79.01]             Anticoagulation Episode Summary     INR check location:       Preferred lab:       Send INR reminders to:   HARRIS ROBIN    Comments:   5 mg tabs, PM dose, print out, BP      Anticoagulation Care Providers     Provider Role Specialty Phone number    Chad GeeDO [facility] Internal Medicine [phone]            See the Encounter Report to view Anticoagulation Flowsheet and Dosing Calendar (Go to Encounters tab in chart review, and find the Anticoagulation Therapy Visit)    Dosage adjustment made based on physician directed care plan.    Diego Almanzar RN

## 2019-04-25 ENCOUNTER — TRANSFERRED RECORDS (OUTPATIENT)
Dept: HEALTH INFORMATION MANAGEMENT | Facility: CLINIC | Age: 84
End: 2019-04-25

## 2019-04-27 DIAGNOSIS — R33.8 BENIGN PROSTATIC HYPERPLASIA WITH URINARY RETENTION: Primary | ICD-10-CM

## 2019-04-27 DIAGNOSIS — N40.1 BENIGN PROSTATIC HYPERPLASIA WITH URINARY RETENTION: Primary | ICD-10-CM

## 2019-04-29 RX ORDER — FINASTERIDE 5 MG/1
TABLET, FILM COATED ORAL
Qty: 30 TABLET | Refills: 8 | Status: SHIPPED | OUTPATIENT
Start: 2019-04-29 | End: 2020-01-16

## 2019-04-29 NOTE — TELEPHONE ENCOUNTER
"Proscar  Last Written Prescription Date:  2/20/19  Last Fill Quantity: 30,  # refills: 1   Last office visit: 3/5/2019 with prescribing provider:     Future Office Visit: NONE  Requested Prescriptions   Pending Prescriptions Disp Refills     finasteride (PROSCAR) 5 MG tablet [Pharmacy Med Name: FINASTERIDE 5MG TABS] 30 tablet 1     Sig: TAKE ONE TABLET BY MOUTH ONCE DAILY       Alpha Blockers Passed - 4/27/2019  5:04 AM        Passed - Blood pressure under 140/90 in past 12 months     BP Readings from Last 3 Encounters:   03/27/19 116/72   03/05/19 118/64   02/04/19 116/68           Passed - Recent (12 mo) or future (30 days) visit within the authorizing provider's specialty     Patient had office visit in the last 12 months or has a visit in the next 30 days with authorizing provider or within the authorizing provider's specialty.  See \"Patient Info\" tab in inbasket, or \"Choose Columns\" in Meds & Orders section of the refill encounter.          Passed - Patient does not have Tadalafil, Vardenafil, or Sildenafil on their medication list        Passed - Medication is active on med list        Passed - Patient is 18 years of age or older         Prescription approved per [facility] Refill Protocol..ARGENIS Garcia        "

## 2019-05-13 DIAGNOSIS — I25.10 CORONARY ARTERY DISEASE INVOLVING NATIVE CORONARY ARTERY WITHOUT ANGINA PECTORIS: ICD-10-CM

## 2019-05-13 RX ORDER — CARVEDILOL 12.5 MG/1
12.5 TABLET ORAL 2 TIMES DAILY WITH MEALS
Qty: 180 TABLET | Refills: 3 | Status: SHIPPED | OUTPATIENT
Start: 2019-05-13 | End: 2020-05-14

## 2019-06-03 ENCOUNTER — ANTICOAGULATION THERAPY VISIT (OUTPATIENT)
Dept: ANTICOAGULATION | Facility: OTHER | Age: 84
End: 2019-06-03
Payer: COMMERCIAL

## 2019-06-03 DIAGNOSIS — Z79.01 LONG TERM CURRENT USE OF ANTICOAGULANT THERAPY: ICD-10-CM

## 2019-06-03 LAB — INR POINT OF CARE: 2.3 (ref 0.86–1.14)

## 2019-06-03 PROCEDURE — 85610 PROTHROMBIN TIME: CPT | Mod: QW

## 2019-06-03 PROCEDURE — 36416 COLLJ CAPILLARY BLOOD SPEC: CPT

## 2019-06-03 PROCEDURE — 99207 ZZC NO CHARGE NURSE ONLY: CPT

## 2019-06-03 NOTE — PROGRESS NOTES
ANTICOAGULATION FOLLOW-UP CLINIC VISIT    Patient Name:  Kevin Partida  Date:  6/3/2019  Contact Type:  Face to Face    SUBJECTIVE:  Patient Findings     Comments:   The patient was assessed for diet, medication, and activity level changes, missed or extra doses, bruising or bleeding, with no problem findings.          Clinical Outcomes     Negatives:   Major bleeding event, Thromboembolic event, Anticoagulation-related hospital admission, Anticoagulation-related ED visit, Anticoagulation-related fatality    Comments:   The patient was assessed for diet, medication, and activity level changes, missed or extra doses, bruising or bleeding, with no problem findings.             OBJECTIVE    INR Protime   Date Value Ref Range Status   2019 2.3 (A) 0.86 - 1.14 Final       ASSESSMENT / PLAN  INR assessment THER    Recheck INR In: 6 WEEKS    INR Location Clinic      Anticoagulation Summary  As of 6/3/2019    INR goal:   2.0-3.0   TTR:   51.5 % (3.2 y)   INR used for dosin.3 (6/3/2019)   Warfarin maintenance plan:   7.5 mg (5 mg x 1.5) every Mon; 5 mg (5 mg x 1) all other days   Full warfarin instructions:   7.5 mg every Mon; 5 mg all other days   Weekly warfarin total:   37.5 mg   No change documented:   Diego Almanzar, RN   Plan last modified:   Diego Almanzar RN (2019)   Next INR check:   7/15/2019   Target end date:       Indications    Long term current use of anticoagulant therapy [Z79.01]             Anticoagulation Episode Summary     INR check location:       Preferred lab:       Send INR reminders to:   HARRIS ROBIN    Comments:   5 mg tabs, PM dose, print out, BP      Anticoagulation Care Providers     Provider Role Specialty Phone number    Chad Gee DO [facility] Internal Medicine [phone]            See the Encounter Report to view Anticoagulation Flowsheet and Dosing Calendar (Go to Encounters tab in chart review, and find the Anticoagulation Therapy Visit)    Dosage  adjustment made based on physician directed care plan.    Diego Almanzar RN

## 2019-07-03 DIAGNOSIS — I48.20 CHRONIC ATRIAL FIBRILLATION (H): ICD-10-CM

## 2019-07-03 RX ORDER — WARFARIN SODIUM 5 MG/1
TABLET ORAL
Qty: 95 TABLET | Refills: 0 | Status: SHIPPED | OUTPATIENT
Start: 2019-07-03 | End: 2019-09-30

## 2019-07-03 NOTE — TELEPHONE ENCOUNTER
"Warfarin  Last Written Prescription Date:  04/12/2019  Last Fill Quantity: 95,  # refills: 0   Last office visit: 3/5/2019 with prescribing provider:  Marlen   Future Office Visit:  None    Requested Prescriptions   Pending Prescriptions Disp Refills     warfarin (JANTOVEN) 5 MG tablet [Pharmacy Med Name: JANTOVEN 5MG TABS] 95 tablet 0     Sig: TAKE ONE AND ONE-HALF TABLETS BY MOUTH ON MONDAY AND TAKE 1 TABLET BY MOUTH  ALL OTHER DAYS OR AS DIRECTED BY THE COUMADIN CLINIC       Vitamin K Antagonists Failed - 7/3/2019  5:03 AM        Failed - INR is within goal in the past 6 weeks     Confirm INR is within goal in the past 6 weeks.   Recent Labs   Lab Test 06/03/19   INR 2.3*           Passed - Recent (12 mo) or future (30 days) visit within the authorizing provider's specialty     Patient had office visit in the last 12 months or has a visit in the next 30 days with authorizing provider or within the authorizing provider's specialty.  See \"Patient Info\" tab in inbasket, or \"Choose Columns\" in Meds & Orders section of the refill encounter.          Passed - Medication is active on med list        Passed - Patient is 18 years of age or older      Carol Collins RN   "

## 2019-07-07 DIAGNOSIS — I48.20 CHRONIC ATRIAL FIBRILLATION (H): ICD-10-CM

## 2019-07-08 RX ORDER — WARFARIN SODIUM 5 MG/1
TABLET ORAL
Qty: 95 TABLET | Refills: 0 | Status: SHIPPED | OUTPATIENT
Start: 2019-07-08 | End: 2020-10-08

## 2019-07-08 NOTE — TELEPHONE ENCOUNTER
"Warfarin  Last Written Prescription Date:  7/3/2019  Last Fill Quantity: 95,  # refills: 0   Last office visit: 3/5/2019 with prescribing provider:  Marlen   Future Office Visit:      Requested Prescriptions   Pending Prescriptions Disp Refills     warfarin (JANTOVEN) 5 MG tablet [Pharmacy Med Name: JANTOVEN 5MG TABS] 95 tablet 0     Sig: TAKE ONE AND ONE-HALF TABLETS BY MOUTH ON MONDAY AND TAKE 1 TABLET BY MOUTH  ALL OTHER DAYS OR AS DIRECTED BY THE COUMADIN CLINIC       Vitamin K Antagonists Failed - 7/7/2019  5:02 AM        Failed - INR is within goal in the past 6 weeks     Confirm INR is within goal in the past 6 weeks.     Recent Labs   Lab Test 06/03/19   INR 2.3*                       Passed - Recent (12 mo) or future (30 days) visit within the authorizing provider's specialty     Patient had office visit in the last 12 months or has a visit in the next 30 days with authorizing provider or within the authorizing provider's specialty.  See \"Patient Info\" tab in inbasket, or \"Choose Columns\" in Meds & Orders section of the refill encounter.              Passed - Medication is active on med list        Passed - Patient is 18 years of age or older        Catalina Amado RN on 7/8/2019 at 10:10 AM    "

## 2019-07-15 ENCOUNTER — ANTICOAGULATION THERAPY VISIT (OUTPATIENT)
Dept: ANTICOAGULATION | Facility: OTHER | Age: 84
End: 2019-07-15
Payer: COMMERCIAL

## 2019-07-15 DIAGNOSIS — Z79.01 LONG TERM CURRENT USE OF ANTICOAGULANT THERAPY: ICD-10-CM

## 2019-07-15 LAB — INR POINT OF CARE: 1.8 (ref 0.86–1.14)

## 2019-07-15 PROCEDURE — 36416 COLLJ CAPILLARY BLOOD SPEC: CPT

## 2019-07-15 PROCEDURE — 85610 PROTHROMBIN TIME: CPT | Mod: QW

## 2019-07-15 PROCEDURE — 99207 ZZC NO CHARGE NURSE ONLY: CPT

## 2019-07-15 NOTE — PROGRESS NOTES
ANTICOAGULATION FOLLOW-UP CLINIC VISIT    Patient Name:  Kevin Partida  Date:  7/15/2019  Contact Type:  Face to Face    SUBJECTIVE:  Patient Findings     Positives:   Missed doses (Missed last night's dose)             OBJECTIVE    INR Protime   Date Value Ref Range Status   07/15/2019 1.8 (A) 0.86 - 1.14 Final       ASSESSMENT / PLAN  INR assessment SUB    Recheck INR In: 6 WEEKS    INR Location Clinic      Anticoagulation Summary  As of 7/15/2019    INR goal:   2.0-3.0   TTR:   51.8 % (3.3 y)   INR used for dosin.8! (7/15/2019)   Warfarin maintenance plan:   7.5 mg (5 mg x 1.5) every Mon; 5 mg (5 mg x 1) all other days   Full warfarin instructions:   7/15: 10 mg; Otherwise 7.5 mg every Mon; 5 mg all other days   Weekly warfarin total:   37.5 mg   Plan last modified:   Diego Almanzar RN (2019)   Next INR check:   2019   Target end date:       Indications    Long term current use of anticoagulant therapy [Z79.01]             Anticoagulation Episode Summary     INR check location:       Preferred lab:       Send INR reminders to:   DOMINIQUE CORONAANGI DURANT    Comments:   5 mg tabs, PM dose, print out, BP      Anticoagulation Care Providers     Provider Role Specialty Phone number    Chad Gee DO [facility] Internal Medicine [phone]            See the Encounter Report to view Anticoagulation Flowsheet and Dosing Calendar (Go to Encounters tab in chart review, and find the Anticoagulation Therapy Visit)    Dosage adjustment made based on physician directed care plan.    Diego Almanzar RN

## 2019-08-26 ENCOUNTER — ANTICOAGULATION THERAPY VISIT (OUTPATIENT)
Dept: ANTICOAGULATION | Facility: OTHER | Age: 84
End: 2019-08-26
Payer: COMMERCIAL

## 2019-08-26 DIAGNOSIS — Z79.01 LONG TERM CURRENT USE OF ANTICOAGULANT THERAPY: ICD-10-CM

## 2019-08-26 LAB — INR POINT OF CARE: 2.7 (ref 0.86–1.14)

## 2019-08-26 PROCEDURE — 36416 COLLJ CAPILLARY BLOOD SPEC: CPT

## 2019-08-26 PROCEDURE — 99207 ZZC NO CHARGE NURSE ONLY: CPT

## 2019-08-26 PROCEDURE — 85610 PROTHROMBIN TIME: CPT | Mod: QW

## 2019-08-26 NOTE — PROGRESS NOTES
ANTICOAGULATION FOLLOW-UP CLINIC VISIT    Patient Name:  Kevin Partida  Date:  2019  Contact Type:  Face to Face    SUBJECTIVE:  Patient Findings     Comments:   The patient was assessed for diet, medication, and activity level changes, missed or extra doses, bruising or bleeding, with no problem findings.          Clinical Outcomes     Negatives:   Major bleeding event, Thromboembolic event, Anticoagulation-related hospital admission, Anticoagulation-related ED visit, Anticoagulation-related fatality    Comments:   The patient was assessed for diet, medication, and activity level changes, missed or extra doses, bruising or bleeding, with no problem findings.             OBJECTIVE    INR Protime   Date Value Ref Range Status   2019 2.7 (A) 0.86 - 1.14 Final       ASSESSMENT / PLAN  INR assessment THER    Recheck INR In: 6 WEEKS    INR Location Clinic      Anticoagulation Summary  As of 2019    INR goal:   2.0-3.0   TTR:   52.7 % (3.4 y)   INR used for dosin.7 (2019)   Warfarin maintenance plan:   7.5 mg (5 mg x 1.5) every Mon; 5 mg (5 mg x 1) all other days   Full warfarin instructions:   7.5 mg every Mon; 5 mg all other days   Weekly warfarin total:   37.5 mg   No change documented:   Diego Almanzar, RN   Plan last modified:   Diego Almanzar RN (2019)   Next INR check:   10/7/2019   Target end date:       Indications    Long term current use of anticoagulant therapy [Z79.01]             Anticoagulation Episode Summary     INR check location:       Preferred lab:       Send INR reminders to:   [facility] [place]    Comments:   5 mg tabs, PM dose, print out, BP      Anticoagulation Care Providers     Provider Role Specialty Phone number    Chad eGe DO [facility] Internal Medicine [phone]            See the Encounter Report to view Anticoagulation Flowsheet and Dosing Calendar (Go to Encounters tab in chart review, and find the Anticoagulation Therapy  Visit)    Dosage adjustment made based on physician directed care plan.    Diego Almanzar RN

## 2019-09-28 ENCOUNTER — HEALTH MAINTENANCE LETTER (OUTPATIENT)
Age: 84
End: 2019-09-28

## 2019-09-30 DIAGNOSIS — I48.20 CHRONIC ATRIAL FIBRILLATION (H): ICD-10-CM

## 2019-09-30 RX ORDER — WARFARIN SODIUM 5 MG/1
TABLET ORAL
Qty: 95 TABLET | Refills: 0 | Status: SHIPPED | OUTPATIENT
Start: 2019-09-30 | End: 2020-10-08

## 2019-09-30 NOTE — TELEPHONE ENCOUNTER
"Requested Prescriptions   Pending Prescriptions Disp Refills     warfarin ANTICOAGULANT (JANTOVEN ANTICOAGULANT) 5 MG tablet [Pharmacy Med Name: JANTOVEN 5MG TABS] 95 tablet 0     Sig: TAKE ONE AND ONE-HALF TABLETS BY MOUTH ON MONDAY AND TAKE ONE TABLET BY MOUTH ALL OTHER DAYS OR AS DIRECTED BY COUMADIN CLINIC       Vitamin K Antagonists Failed - 9/30/2019  5:02 AM        Failed - INR is within goal in the past 6 weeks     Confirm INR is within goal in the past 6 weeks.     Recent Labs   Lab Test 08/26/19   INR 2.7*                       Failed - Medication is active on med list        Passed - Recent (12 mo) or future (30 days) visit within the authorizing provider's specialty     Patient has had an office visit with the authorizing provider or a provider within the authorizing providers department within the previous 12 mos or has a future within next 30 days. See \"Patient Info\" tab in inbasket, or \"Choose Columns\" in Meds & Orders section of the refill encounter.              Passed - Patient is 18 years of age or older        "

## 2019-10-01 DIAGNOSIS — I48.20 CHRONIC ATRIAL FIBRILLATION (H): ICD-10-CM

## 2019-10-01 RX ORDER — WARFARIN SODIUM 5 MG/1
TABLET ORAL
Qty: 95 TABLET | Refills: 0 | Status: SHIPPED | OUTPATIENT
Start: 2019-10-01 | End: 2020-10-08

## 2019-10-03 DIAGNOSIS — I48.20 CHRONIC ATRIAL FIBRILLATION (H): ICD-10-CM

## 2019-10-03 RX ORDER — WARFARIN SODIUM 5 MG/1
TABLET ORAL
Qty: 95 TABLET | Refills: 0 | Status: SHIPPED | OUTPATIENT
Start: 2019-10-03 | End: 2020-04-03

## 2019-10-07 ENCOUNTER — ANTICOAGULATION THERAPY VISIT (OUTPATIENT)
Dept: ANTICOAGULATION | Facility: OTHER | Age: 84
End: 2019-10-07
Payer: COMMERCIAL

## 2019-10-07 DIAGNOSIS — Z79.01 LONG TERM CURRENT USE OF ANTICOAGULANT THERAPY: ICD-10-CM

## 2019-10-07 LAB — INR POINT OF CARE: 2 (ref 0.86–1.14)

## 2019-10-07 PROCEDURE — 99207 ZZC NO CHARGE NURSE ONLY: CPT

## 2019-10-07 PROCEDURE — 36416 COLLJ CAPILLARY BLOOD SPEC: CPT

## 2019-10-07 PROCEDURE — 85610 PROTHROMBIN TIME: CPT | Mod: QW

## 2019-10-07 NOTE — PROGRESS NOTES
ANTICOAGULATION FOLLOW-UP CLINIC VISIT    Patient Name:  Kevin Partida  Date:  10/7/2019  Contact Type:  Face to Face    SUBJECTIVE:  Patient Findings     Comments:   The patient was assessed for diet, medication, and activity level changes, missed or extra doses, bruising or bleeding, with no problem findings.          Clinical Outcomes     Negatives:   Major bleeding event, Thromboembolic event, Anticoagulation-related hospital admission, Anticoagulation-related ED visit, Anticoagulation-related fatality    Comments:   The patient was assessed for diet, medication, and activity level changes, missed or extra doses, bruising or bleeding, with no problem findings.             OBJECTIVE    INR Protime   Date Value Ref Range Status   10/07/2019 2.0 (A) 0.86 - 1.14 Final       ASSESSMENT / PLAN  INR assessment THER    Recheck INR In: 6 WEEKS    INR Location Clinic      Anticoagulation Summary  As of 10/7/2019    INR goal:   2.0-3.0   TTR:   54.2 % (3.5 y)   INR used for dosin.0 (10/7/2019)   Warfarin maintenance plan:   7.5 mg (5 mg x 1.5) every Mon; 5 mg (5 mg x 1) all other days   Full warfarin instructions:   7.5 mg every Mon; 5 mg all other days   Weekly warfarin total:   37.5 mg   No change documented:   Diego Almanzar, RN   Plan last modified:   Diego Almanzar RN (2019)   Next INR check:   2019   Target end date:       Indications    Long term current use of anticoagulant therapy [Z79.01]             Anticoagulation Episode Summary     INR check location:       Preferred lab:       Send INR reminders to:   [facility] [place]    Comments:   5 mg tabs, PM dose, print out, BP      Anticoagulation Care Providers     Provider Role Specialty Phone number    Chad Gee DO [facility] Internal Medicine [phone]            See the Encounter Report to view Anticoagulation Flowsheet and Dosing Calendar (Go to Encounters tab in chart review, and find the Anticoagulation Therapy  Visit)    Dosage adjustment made based on physician directed care plan.    Diego Almanzar RN

## 2019-11-18 ENCOUNTER — ANTICOAGULATION THERAPY VISIT (OUTPATIENT)
Dept: ANTICOAGULATION | Facility: OTHER | Age: 84
End: 2019-11-18
Payer: COMMERCIAL

## 2019-11-18 DIAGNOSIS — Z79.01 LONG TERM CURRENT USE OF ANTICOAGULANT THERAPY: ICD-10-CM

## 2019-11-18 LAB — INR POINT OF CARE: 2.1 (ref 0.86–1.14)

## 2019-11-18 PROCEDURE — 99207 ZZC NO CHARGE NURSE ONLY: CPT

## 2019-11-18 PROCEDURE — 85610 PROTHROMBIN TIME: CPT | Mod: QW

## 2019-11-18 PROCEDURE — 36416 COLLJ CAPILLARY BLOOD SPEC: CPT

## 2019-11-18 NOTE — PROGRESS NOTES
ANTICOAGULATION FOLLOW-UP CLINIC VISIT    Patient Name:  Kevin Partida  Date:  2019  Contact Type:  Face to Face    SUBJECTIVE:  Patient Findings     Comments:   The patient was assessed for diet, medication, and activity level changes, missed or extra doses, bruising or bleeding, with no problem findings.          Clinical Outcomes     Negatives:   Major bleeding event, Thromboembolic event, Anticoagulation-related hospital admission, Anticoagulation-related ED visit, Anticoagulation-related fatality    Comments:   The patient was assessed for diet, medication, and activity level changes, missed or extra doses, bruising or bleeding, with no problem findings.             OBJECTIVE    INR Protime   Date Value Ref Range Status   2019 2.1 (A) 0.86 - 1.14 Final       ASSESSMENT / PLAN  INR assessment THER    Recheck INR In: 6 WEEKS    INR Location Clinic      Anticoagulation Summary  As of 2019    INR goal:   2.0-3.0   TTR:   55.6 % (3.7 y)   INR used for dosin.1 (2019)   Warfarin maintenance plan:   7.5 mg (5 mg x 1.5) every Mon; 5 mg (5 mg x 1) all other days   Full warfarin instructions:   7.5 mg every Mon; 5 mg all other days   Weekly warfarin total:   37.5 mg   No change documented:   Diego Almanzar, RN   Plan last modified:   Diego Almanzar RN (2019)   Next INR check:   2019   Target end date:       Indications    Long term current use of anticoagulant therapy [Z79.01]             Anticoagulation Episode Summary     INR check location:       Preferred lab:       Send INR reminders to:   [facility] [place]    Comments:   5 mg tabs, PM dose, print out, BP      Anticoagulation Care Providers     Provider Role Specialty Phone number    Chad Gee DO [facility] Internal Medicine [phone]            See the Encounter Report to view Anticoagulation Flowsheet and Dosing Calendar (Go to Encounters tab in chart review, and find the Anticoagulation Therapy  Visit)    Dosage adjustment made based on physician directed care plan.    Diego Almanzar RN

## 2019-11-19 ENCOUNTER — OFFICE VISIT (OUTPATIENT)
Dept: FAMILY MEDICINE | Facility: OTHER | Age: 84
End: 2019-11-19
Payer: COMMERCIAL

## 2019-11-19 VITALS
OXYGEN SATURATION: 93 % | DIASTOLIC BLOOD PRESSURE: 68 MMHG | RESPIRATION RATE: 16 BRPM | SYSTOLIC BLOOD PRESSURE: 120 MMHG | HEART RATE: 112 BPM | WEIGHT: 243.1 LBS | TEMPERATURE: 97.2 F | BODY MASS INDEX: 32.74 KG/M2

## 2019-11-19 DIAGNOSIS — I48.20 CHRONIC ATRIAL FIBRILLATION (H): Primary | ICD-10-CM

## 2019-11-19 DIAGNOSIS — Z00.00 MEDICARE ANNUAL WELLNESS VISIT, SUBSEQUENT: ICD-10-CM

## 2019-11-19 DIAGNOSIS — N40.1 BENIGN PROSTATIC HYPERPLASIA WITH URINARY RETENTION: ICD-10-CM

## 2019-11-19 DIAGNOSIS — I10 HYPERTENSION GOAL BP (BLOOD PRESSURE) < 140/90: ICD-10-CM

## 2019-11-19 DIAGNOSIS — I42.9 PRIMARY CARDIOMYOPATHY (H): ICD-10-CM

## 2019-11-19 DIAGNOSIS — I25.10 CORONARY ARTERY DISEASE INVOLVING NATIVE CORONARY ARTERY OF NATIVE HEART WITHOUT ANGINA PECTORIS: ICD-10-CM

## 2019-11-19 DIAGNOSIS — Z00.00 ENCOUNTER FOR MEDICARE ANNUAL WELLNESS EXAM: ICD-10-CM

## 2019-11-19 DIAGNOSIS — R33.8 BENIGN PROSTATIC HYPERPLASIA WITH URINARY RETENTION: ICD-10-CM

## 2019-11-19 LAB
ALBUMIN SERPL-MCNC: 3.7 G/DL (ref 3.4–5)
ALP SERPL-CCNC: 84 U/L (ref 40–150)
ALT SERPL W P-5'-P-CCNC: 22 U/L (ref 0–70)
ANION GAP SERPL CALCULATED.3IONS-SCNC: 4 MMOL/L (ref 3–14)
AST SERPL W P-5'-P-CCNC: 18 U/L (ref 0–45)
BILIRUB SERPL-MCNC: 1.2 MG/DL (ref 0.2–1.3)
BUN SERPL-MCNC: 18 MG/DL (ref 7–30)
CALCIUM SERPL-MCNC: 8.6 MG/DL (ref 8.5–10.1)
CHLORIDE SERPL-SCNC: 106 MMOL/L (ref 94–109)
CO2 SERPL-SCNC: 31 MMOL/L (ref 20–32)
CREAT SERPL-MCNC: 0.95 MG/DL (ref 0.66–1.25)
ERYTHROCYTE [DISTWIDTH] IN BLOOD BY AUTOMATED COUNT: 14.2 % (ref 10–15)
GFR SERPL CREATININE-BSD FRML MDRD: 73 ML/MIN/{1.73_M2}
GLUCOSE SERPL-MCNC: 118 MG/DL (ref 70–99)
HCT VFR BLD AUTO: 48.5 % (ref 40–53)
HGB BLD-MCNC: 15.8 G/DL (ref 13.3–17.7)
MCH RBC QN AUTO: 31.5 PG (ref 26.5–33)
MCHC RBC AUTO-ENTMCNC: 32.6 G/DL (ref 31.5–36.5)
MCV RBC AUTO: 97 FL (ref 78–100)
PLATELET # BLD AUTO: 179 10E9/L (ref 150–450)
POTASSIUM SERPL-SCNC: 4.6 MMOL/L (ref 3.4–5.3)
PROT SERPL-MCNC: 6.9 G/DL (ref 6.8–8.8)
RBC # BLD AUTO: 5.01 10E12/L (ref 4.4–5.9)
SODIUM SERPL-SCNC: 141 MMOL/L (ref 133–144)
WBC # BLD AUTO: 7.7 10E9/L (ref 4–11)

## 2019-11-19 PROCEDURE — 80053 COMPREHEN METABOLIC PANEL: CPT | Performed by: INTERNAL MEDICINE

## 2019-11-19 PROCEDURE — 85027 COMPLETE CBC AUTOMATED: CPT | Performed by: INTERNAL MEDICINE

## 2019-11-19 PROCEDURE — 36415 COLL VENOUS BLD VENIPUNCTURE: CPT | Performed by: INTERNAL MEDICINE

## 2019-11-19 PROCEDURE — G0439 PPPS, SUBSEQ VISIT: HCPCS | Performed by: INTERNAL MEDICINE

## 2019-11-19 ASSESSMENT — ACTIVITIES OF DAILY LIVING (ADL): CURRENT_FUNCTION: NO ASSISTANCE NEEDED

## 2019-11-19 ASSESSMENT — PAIN SCALES - GENERAL: PAINLEVEL: MODERATE PAIN (5)

## 2019-11-19 NOTE — PROGRESS NOTES
"SUBJECTIVE:   Kevin Partida is a 84 year old male who presents for Preventive Visit.      Are you in the first 12 months of your Medicare coverage?  No    Healthy Habits:     In general, how would you rate your overall health?  Fair    Frequency of exercise:  None    Do you usually eat at least 4 servings of fruit and vegetables a day, include whole grains    & fiber and avoid regularly eating high fat or \"junk\" foods?  No    Taking medications regularly:  Yes    Barriers to taking medications:  None    Medication side effects:  None    Ability to successfully perform activities of daily living:  No assistance needed    Home Safety:  No safety concerns identified    Hearing Impairment:  Need to ask people to speak up or repeat themselves and difficulty understanding soft or whispered speech    In the past 6 months, have you been bothered by leaking of urine?  No    In general, how would you rate your overall mental or emotional health?  Good      PHQ-2 Total Score: 0    Additional concerns today:  No    Do you feel safe in your environment? Yes    Have you ever done Advance Care Planning? (For example, a Health Directive, POLST, or a discussion with a medical provider or your loved ones about your wishes): Yes, advance care planning is on file.      Fall risk  Fallen 2 or more times in the past year?: No  Any fall with injury in the past year?: No    Cognitive Screening   1) Repeat 3 items (Leader, Season, Table)    2) Clock draw: NORMAL  3) 3 item recall: Recalls 3 objects  Results: 3 items recalled: COGNITIVE IMPAIRMENT LESS LIKELY    Mini-CogTM Copyright NICOLE Herrera. Licensed by the author for use in [facility]; reprinted with permission ([email or web address]). All rights reserved.      Do you have sleep apnea, excessive snoring or daytime drowsiness?: no    Reviewed and updated as needed this visit by clinical staff  Tobacco  Allergies  Meds  Med Hx  Surg Hx  Fam Hx  Soc Hx        Reviewed and updated " as needed this visit by Provider        Social History     Tobacco Use     Smoking status: Former Smoker     Packs/day: 0.50     Types: Cigarettes     Smokeless tobacco: Never Used     Tobacco comment: smoked for 57 years- since age 9   Substance Use Topics     Alcohol use: Yes     Alcohol/week: 0.0 standard drinks     Comment: socially     If you drink alcohol do you typically have >3 drinks per day or >7 drinks per week? No    Alcohol Use 8/5/2014   Prescreen: >3 drinks/day or >7 drinks/week? one beer a month   No flowsheet data found.          Current providers sharing in care for this patient include:   Patient Care Team:  Chad Gee DO as PCP - General (Internal Medicine)  Chad Gee DO as Assigned PCP    The following health maintenance items are reviewed in Epic and correct as of today:  Health Maintenance   Topic Date Due     ZOSTER IMMUNIZATION (1 of 2) 03/09/1985     MEDICARE ANNUAL WELLNESS VISIT  08/05/2015     OP ANNUAL INR REFERRAL  07/08/2017     INFLUENZA VACCINE (1) 09/01/2019     BMP  09/18/2019     FALL RISK ASSESSMENT  11/21/2019     CBC  12/12/2019     ALT  03/18/2020     LIPID  03/18/2020     ADVANCE CARE PLANNING  12/05/2021     HF ACTION PLAN  01/02/2022     DTAP/TDAP/TD IMMUNIZATION (3 - Td) 10/08/2023     PHQ-2  Completed     PNEUMOCOCCAL IMMUNIZATION 65+ LOW/MEDIUM RISK  Completed     IPV IMMUNIZATION  Aged Out     MENINGITIS IMMUNIZATION  Aged Out   Looks like no swelling noted  Lab work is in process  Labs reviewed in EPIC  BP Readings from Last 3 Encounters:   11/19/19 120/68   03/27/19 116/72   03/05/19 118/64    Wt Readings from Last 3 Encounters:   11/19/19 110.3 kg (243 lb 1.6 oz)   03/27/19 108.6 kg (239 lb 6.4 oz)   03/05/19 109.9 kg (242 lb 3 oz)                  Patient Active Problem List   Diagnosis     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     Cardiovascular disease     Tobacco use disorder     Primary cardiomyopathy (H)      HYPERLIPIDEMIA LDL GOAL <100     Hypertension goal BP (blood pressure) < 140/90     Advance Care Planning     CAD (coronary artery disease)     Long term current use of anticoagulant therapy     Chronic atrial fibrillation     Rupture of right distal biceps tendon, initial encounter     Past Surgical History:   Procedure Laterality Date     COLONOSCOPY  2011    Procedure:COLONOSCOPY; colonoscopy; Surgeon:IVETH WIGGINS; Location:[facility]     HC COLONOSCOPY W/WO BRUSH/WASH  06     HC LAPAROSCOPY, SURGICAL; CHOLECYSTECTOMY      Cholecystectomy, Laparoscopic     HC REMOVAL OF TONSILS,<11 Y/O      Tonsils <12y.o.     LAMINECT/DISCECTOMY, LUMBAR  08    Right L4-L5 microlumbar diskectomy.       Social History     Tobacco Use     Smoking status: Former Smoker     Packs/day: 0.50     Types: Cigarettes     Smokeless tobacco: Never Used     Tobacco comment: smoked for 57 years- since age 9   Substance Use Topics     Alcohol use: Yes     Alcohol/week: 0.0 standard drinks     Comment: socially     Family History   Problem Relation Age of Onset     Alzheimer Disease Mother         ?dimentia or alzheimers     Diabetes Mother         insulin     EYE* Mother         cataract     Cerebrovascular Disease Mother      Cancer Father         lymph tumor of glands     Diabetes Maternal Aunt         ? oral or insulin     Diabetes Other         4 cousins insulin dependent     Genetic Disorder Maternal Grandmother         tremors     Heart Disease Paternal Uncle         ? at age 60-70     Neurologic Disorder Paternal Uncle         parkinsons     Cerebrovascular Disease Paternal Uncle         ? dued at age 60-70         Current Outpatient Medications   Medication Sig Dispense Refill     aspirin 81 MG tablet Take 1 tablet by mouth daily.       carvedilol (COREG) 12.5 MG tablet Take 1 tablet (12.5 mg) by mouth 2 times daily (with meals) 180 tablet 3     finasteride (PROSCAR) 5 MG tablet TAKE ONE TABLET BY MOUTH ONCE DAILY 30  tablet 8     furosemide (LASIX) 20 MG tablet Take 1 tablet (20 mg) by mouth daily 90 tablet 3     lisinopril (PRINIVIL/ZESTRIL) 10 MG tablet Take 1 tablet (10 mg) by mouth daily 90 tablet 3     simvastatin (ZOCOR) 80 MG tablet Take 1 tablet (80 mg) by mouth daily 90 tablet 3     tamsulosin (FLOMAX) 0.4 MG capsule Take 1 capsule (0.4 mg) by mouth daily 30 capsule 0     warfarin (JANTOVEN) 5 MG tablet Take 7.5 mg on Monday and 5 mg all other days, or as directed by the Coumadin [facility] 95 tablet 0     warfarin ANTICOAGULANT (JANTOVEN ANTICOAGULANT) 5 MG tablet TAKE ONE AND ONE-HALF TABLETS BY MOUTH ON MONDAY AND TAKE ONE TABLET BY MOUTH ALL OTHER DAYS OR AS DIRECTED BY Critical access hospital 95 tablet 0     warfarin ANTICOAGULANT (JANTOVEN ANTICOAGULANT) 5 MG tablet TAKE ONE AND ONE-HALF TABLETS BY MOUTH ON MONDAY AND TAKE ONE TABLET BY MOUTH ALL OTHER DAYS OR AS DIRECTED BY Critical access hospital 95 tablet 0     warfarin ANTICOAGULANT (JANTOVEN ANTICOAGULANT) 5 MG tablet TAKE ONE AND ONE-HALF TABLETS BY MOUTH ON MONDAY AND TAKE ONE TABLET BY MOUTH ALL OTHER DAYS OR AS DIRECTED BY Critical access hospital 95 tablet 0     Allergies   Allergen Reactions     Codeine GI Disturbance     Niacin Hives     got very red and flushed.  Doesn't want         Review of Systems  CONSTITUTIONAL: NEGATIVE for fever, chills, change in weight  INTEGUMENTARY/SKIN: NEGATIVE for worrisome rashes, moles or lesions  EYES: NEGATIVE for vision changes or irritation  ENT/MOUTH: NEGATIVE for ear, mouth and throat problems  RESP: NEGATIVE for significant cough or SOB  CV: NEGATIVE for chest pain, palpitations or peripheral edema  GI: NEGATIVE for nausea, abdominal pain, heartburn, or change in bowel habits  : NEGATIVE for frequency, dysuria, or hematuria  MUSCULOSKELETAL: NEGATIVE for significant arthralgias or myalgia  NEURO: NEGATIVE for weakness, dizziness or paresthesias  ENDOCRINE: NEGATIVE for temperature intolerance, skin/hair changes  HEME: NEGATIVE for  "bleeding problems  PSYCHIATRIC: NEGATIVE for changes in mood or affect    OBJECTIVE:   /68   Pulse 112   Temp 97.2  F (36.2  C) (Temporal)   Resp 16   Wt 110.3 kg (243 lb 1.6 oz)   SpO2 93%   BMI 32.74 kg/m   Estimated body mass index is 32.74 kg/m  as calculated from the following:    Height as of 3/27/19: 1.835 m (6' 0.25\").    Weight as of this encounter: 110.3 kg (243 lb 1.6 oz).  Physical Exam  GENERAL: healthy, alert and no distress  EYES: Eyes grossly normal to inspection, PERRL and conjunctivae and sclerae normal  HENT: ear canals and TM's normal, nose and mouth without ulcers or lesions  NECK: no adenopathy, no asymmetry, masses, or scars and thyroid normal to palpation  RESP: lungs clear to auscultation - no rales, rhonchi or wheezes  CV: irregularly irregular rhythm, normal S1 S2, no S3 or S4, no murmur, click or rub, peripheral pulses strong and no peripheral edema  ABDOMEN: soft, nontender, no hepatosplenomegaly, no masses and bowel sounds normal  MS: no gross musculoskeletal defects noted, no edema  SKIN: no suspicious lesions or rashes  NEURO: Normal strength and tone, mentation intact and speech normal  PSYCH: mentation appears normal, affect normal/bright    Diagnostic Test Results:  No results found for this or any previous visit (from the past 24 hour(s)).    ASSESSMENT / PLAN:       ICD-10-CM    1. Chronic atrial fibrillation I48.20    2. Medicare annual wellness visit, subsequent Z00.00    3. Benign prostatic hyperplasia with urinary retention N40.1     R33.8    4. Hypertension goal BP (blood pressure) < 140/90 I10 CBC with platelets     Comprehensive metabolic panel (BMP + Alb, Alk Phos, ALT, AST, Total. Bili, TP)   5. Coronary artery disease involving native coronary artery of native heart without angina pectoris I25.10    6. Primary cardiomyopathy (H) I42.8        COUNSELING:  Reviewed preventive health counseling, as reflected in patient instructions       Regular exercise       " "Healthy diet/nutrition       Vision screening       Hearing screening       Dental care       Colon cancer screening       Prostate cancer screening    Estimated body mass index is 32.74 kg/m  as calculated from the following:    Height as of 3/27/19: 1.835 m (6' 0.25\").    Weight as of this encounter: 110.3 kg (243 lb 1.6 oz).    Weight management plan: Discussed healthy diet and exercise guidelines     reports that he has quit smoking. His smoking use included cigarettes. He smoked 0.50 packs per day. He has never used smokeless tobacco.      Appropriate preventive services were discussed with this patient, including applicable screening as appropriate for cardiovascular disease, diabetes, osteopenia/osteoporosis, and glaucoma.  As appropriate for age/gender, discussed screening for colorectal cancer, prostate cancer, breast cancer, and cervical cancer. Checklist reviewing preventive services available has been given to the patient.    Reviewed patients plan of care and provided an AVS. The Basic Care Plan (routine screening as documented in Health Maintenance) for Kevin meets the Care Plan requirement. This Care Plan has been established and reviewed with the spouse.    Counseling Resources:  ATP IV Guidelines  Pooled Cohorts Equation Calculator  Breast Cancer Risk Calculator  FRAX Risk Assessment  ICSI Preventive Guidelines  Dietary Guidelines for Americans, 2010  [employer] MyPlate  ASA Prophylaxis  Lung CA Screening    Chad Gee,   [facility]    Identified Health Risks:    The patient was provided with suggestions to help him develop a healthy physical lifestyle.  He is at risk for lack of exercise and has been provided with information to increase physical activity for the benefit of his well-being.  The patient was counseled and encouraged to consider modifying their diet and eating habits. He was provided with information on recommended healthy diet options.  The patient was " provided with written information regarding signs of hearing loss.

## 2019-11-19 NOTE — PATIENT INSTRUCTIONS
Patient Education   Personalized Prevention Plan  You are due for the preventive services outlined below.  Your care team is available to assist you in scheduling these services.  If you have already completed any of these items, please share that information with your care team to update in your medical record.  Health Maintenance Due   Topic Date Due     Zoster (Shingles) Vaccine (1 of 2) 03/09/1985     Annual Wellness Visit  08/05/2015     INR CLINIC REFERRAL - yearly  07/08/2017     Flu Vaccine (1) 09/01/2019     Basic Metabolic Panel  09/18/2019     FALL RISK ASSESSMENT  11/21/2019     Complete Blood Count  12/12/2019     Your Health Risk Assessment indicates you feel you are not in good health    A healthy lifestyle helps keep the body fit and the mind alert. It helps protect you from disease, helps you fight disease, and helps prevent chronic disease (disease that doesn't go away) from getting worse. This is important as you get older and begin to notice twinges in muscles and joints and a decline in the strength and stamina you once took for granted. A healthy lifestyle includes good healthcare, good nutrition, weight control, recreation, and regular exercise. Avoid harmful substances and do what you can to keep safe. Another part of a healthy lifestyle is stay mentally active and socially involved.    Good healthcare     Have a wellness visit every year.     If you have new symptoms, let us know right away. Don't wait until the next checkup.     Take medicines exactly as prescribed and keep your medicines in a safe place. Tell us if your medicine causes problems.   Healthy diet and weight control     Eat 3 or 4 small, nutritious, low-fat, high-fiber meals a day. Include a variety of fruits, vegetables, and whole-grain foods.     Make sure you get enough calcium in your diet. Calcium, vitamin D, and exercise help prevent osteoporosis (bone thinning).     If you live alone, try eating with others when you  can. That way you get a good meal and have company while you eat it.     Try to keep a healthy weight. If you eat more calories than your body uses for energy, it will be stored as fat and you will gain weight.     Recreation   Recreation is not limited to sports and team events. It includes any activity that provides relaxation, interest, enjoyment, and exercise. Recreation provides an outlet for physical, mental, and social energy. It can give a sense of worth and achievement. It can help you stay healthy.    Mental Exercise and Social Involvement  Mental and emotional health is as important as physical health. Keep in touch with friends and family. Stay as active as possible. Continue to learn and challenge yourself.   Things you can do to stay mentally active are:    Learn something new, like a foreign language or musical instrument.     Play SCRABBLE or do crossword puzzles. If you cannot find people to play these games with you at home, you can play them with others on your computer through the Internet.     Join a games club--anything from card games to chess or checkers or lawn bowling.     Start a new hobby.     Go back to school.     Volunteer.     Read.   Keep up with world events.    Exercise for a Healthier Heart     Exercise with a friend. When activity is fun, you're more likely to stick with it.   You may wonder how you can improve the health of your heart. If you re thinking about exercise, you re on the right track. You don t need to become an athlete, but you do need a certain amount of brisk exercise to help strengthen your heart. If you have been diagnosed with a heart condition, your doctor may recommend exercise to help stabilize your condition. To help make exercise a habit, choose safe, fun activities.  Be sure to check with your healthcare provider before starting an exercise program.   Why exercise?  Exercising regularly offers many healthy rewards. It can help you do all of the  following:    Improve your blood cholesterol level to help prevent further heart trouble    Lower your blood pressure to help prevent a stroke or heart attack    Control diabetes, or reduce your risk of getting this disease    Improve your heart and lung function    Reach and maintain a healthy weight    Make your muscles stronger and more limber so you can stay active    Prevent falls and fractures by slowing the loss of bone mass (osteoporosis)    Manage stress better    Reduce your blood pressure    Improve your sense of self and your body image  Exercise tips  Ease into your routine. Set small goals. Then build on them.  Exercise on most days. Aim for a total of 150 or more minutes of moderate to  vigorous intensity activity each week. Consider 40 minutes, 3 to 4 times a week. For best results, activity should last for 40 minutes on average. It is OK to work up to the 40 minute period over time. Examples of moderate-intensity activity is walking 1 mile in 15 minutes or 30 to 45 minutes of yard work.  Step up your daily activity level. Along with your exercise program, try being more active throughout the day. Walk instead of drive. Do more household tasks or yard work.  Choose one or more activities you enjoy. Walking is one of the easiest things you can do. You can also try swimming, riding a bike, dancing, or taking an exercise class.  Stop exercising and call your doctor if you:    Have chest pain or feel dizzy or lightheaded    Feel burning, tightness, pressure, or heaviness in your chest, neck, shoulders, back, or arms    Have unusual shortness of breath    Have increased joint or muscle pain    Have palpitations or an irregular heartbeat   Date Last Reviewed: 5/1/2016 2000-2018 [employer]. [address] [place] [id number]. All rights reserved. This information is not intended as a substitute for professional medical care. Always follow your healthcare professional's  instructions.          Understanding USDA MyPlate  The USDA (U.S. Department of Agriculture) has guidelines to help you make healthy food choices. These are called MyPlate. MyPlate shows the food groups that make up healthy meals using the image of a place setting. Before you eat, think about the healthiest choices for what to put onto your plate or into your cup or bowl. To learn more about building a healthy plate, visit www.choosemyplate.gov.    The food groups    Fruits. Any fruit or 100% fruit juice counts as part of the Fruit Group. Fruits may be fresh, canned, frozen, or dried, and may be whole, cut-up, or pureed. Make half your plate fruits and vegetables.    Vegetables. Any vegetable or 100% vegetable juice counts as a member of the Vegetable Group. Vegetables may be fresh, frozen, canned, or dried. They can be served raw or cooked and may be whole, cut-up, or mashed. Make half your plate fruits and vegetables.    Grains. All foods made from grains are part of the Grains Group. These include wheat, rice, oats, cornmeal, and barley such as bread, pasta, oatmeal, cereal, tortillas, and grits. Grains should be no more than a quarter of your plate. At least half of your grains should be whole grains.    Protein. This group includes meat, poultry, seafood, beans and peas, eggs, processed soy products (like tofu), nuts (including nut butters), and seeds. Make protein choices no more than a quarter of your plate. Meat and poultry choices should be lean or low fat.    Dairy. All fluid milk products and foods made from milk that contain calcium, like yogurt and cheese, are part of the Dairy Group. (Foods that have little calcium, such as cream, butter, and cream cheese, are not part of the group.) Most dairy choices should be low-fat or fat-free.    Oils. These are fats that are liquid at room temperature. They include canola, corn, olive, soybean, and sunflower oil. Foods that are mainly oil include mayonnaise,  certain salad dressings, and soft margarines. You should have only 5 to 7 teaspoons of oils a day. You probably already get this much from the food you eat.  Date Last Reviewed: 8/1/2017 2000-2018 [employer]. [address] [place] [address]. All rights reserved. This information is not intended as a substitute for professional medical care. Always follow your healthcare professional's instructions.          Signs of Hearing Loss     Hearing much better with one ear can be a sign of hearing loss.     Hearing loss is a problem shared by many people. In fact, it is one of the most common health conditions, particularly as people age. Most people over age 65 have some hearing loss, and by age 80, almost everyone does. Because hearing loss usually occurs slowly over the years, you may not realize your hearing ability has gotten worse.  Have your hearing checked  Contact your healthcare provider if you:    Have to strain to hear normal conversation    Have to watch other people s faces very carefully to follow what they re saying    Need to ask people to repeat what they ve said    Often misunderstand what people are saying    Turn the volume of the television or radio up so high that others complain    Feel that people are mumbling when they re talking to you    Find that the effort to hear leaves you feeling tired and irritated    Notice, when using the phone, that you hear better with one ear than the other  Date Last Reviewed: 12/1/2016 2000-2018 [employer]. [address] [place] [address]. All rights reserved. This information is not intended as a substitute for professional medical care. Always follow your healthcare professional's instructions.

## 2019-11-27 NOTE — RESULT ENCOUNTER NOTE
Dear Kevin, your recent test results are attached.  The chemistry panel demonstrates an elevated blood sugar of 118.  Kidney and liver tests are normal.  The blood cell count is normal without evidence of anemia or leukemia.    Feel free to contact me via the office or My Chart if you have any questions regarding the above.  Sincerely,  Chad Gee DO [facility]

## 2020-01-13 ENCOUNTER — ANTICOAGULATION THERAPY VISIT (OUTPATIENT)
Dept: ANTICOAGULATION | Facility: OTHER | Age: 85
End: 2020-01-13
Payer: COMMERCIAL

## 2020-01-13 DIAGNOSIS — Z79.01 LONG TERM CURRENT USE OF ANTICOAGULANT THERAPY: ICD-10-CM

## 2020-01-13 LAB — INR POINT OF CARE: 2.7 (ref 0.86–1.14)

## 2020-01-13 PROCEDURE — 36416 COLLJ CAPILLARY BLOOD SPEC: CPT

## 2020-01-13 PROCEDURE — 99207 ZZC NO CHARGE NURSE ONLY: CPT

## 2020-01-13 PROCEDURE — 85610 PROTHROMBIN TIME: CPT | Mod: QW

## 2020-01-13 NOTE — PROGRESS NOTES
ANTICOAGULATION FOLLOW-UP CLINIC VISIT    Patient Name:  Kevin Partida  Date:  2020  Contact Type:  Face to Face    SUBJECTIVE:  Patient Findings     Comments:   The patient was assessed for diet, medication, and activity level changes, missed or extra doses, bruising or bleeding, with no problem findings.          Clinical Outcomes     Negatives:   Major bleeding event, Thromboembolic event, Anticoagulation-related hospital admission, Anticoagulation-related ED visit, Anticoagulation-related fatality    Comments:   The patient was assessed for diet, medication, and activity level changes, missed or extra doses, bruising or bleeding, with no problem findings.             OBJECTIVE    INR Protime   Date Value Ref Range Status   2020 2.7 (A) 0.86 - 1.14 Final       ASSESSMENT / PLAN  INR assessment THER    Recheck INR In: 6 WEEKS    INR Location Clinic      Anticoagulation Summary  As of 2020    INR goal:   2.0-3.0   TTR:   87.7 % (1 y)   INR used for dosin.7 (2020)   Warfarin maintenance plan:   7.5 mg (5 mg x 1.5) every Mon; 5 mg (5 mg x 1) all other days   Full warfarin instructions:   7.5 mg every Mon; 5 mg all other days   Weekly warfarin total:   37.5 mg   No change documented:   Diego Almanzar, RN   Plan last modified:   Diego Almanzar, RN (2019)   Next INR check:   2020   Priority:   Maintenance   Target end date:       Indications    Long term current use of anticoagulant therapy [Z79.01]             Anticoagulation Episode Summary     INR check location:       Preferred lab:       Send INR reminders to:   [facility]    Comments:   5 mg tabs, PM dose, print out, BP      Anticoagulation Care Providers     Provider Role Specialty Phone number    Chad Gee DO [facility] Internal Medicine [phone]            See the Encounter Report to view Anticoagulation Flowsheet and Dosing Calendar (Go to Encounters tab in chart review, and find the  Anticoagulation Therapy Visit)    Dosage adjustment made based on physician directed care plan.    Diego Almanzar RN

## 2020-01-16 DIAGNOSIS — R33.8 BENIGN PROSTATIC HYPERPLASIA WITH URINARY RETENTION: ICD-10-CM

## 2020-01-16 DIAGNOSIS — N40.1 BENIGN PROSTATIC HYPERPLASIA WITH URINARY RETENTION: ICD-10-CM

## 2020-01-16 RX ORDER — FINASTERIDE 5 MG/1
TABLET, FILM COATED ORAL
Qty: 30 TABLET | Refills: 8 | Status: SHIPPED | OUTPATIENT
Start: 2020-01-16 | End: 2020-10-08

## 2020-01-16 NOTE — TELEPHONE ENCOUNTER
Finasteride 5 MG       Last Written Prescription Date:  4/29/19  Last Fill Quantity: 30,   # refills: 8  Last Office Visit: 11/19/19  Future Office visit:       Routing refill request to provider for review/approval because:  Drug not on the [facility], P or [facility] refill protocol or controlled substance

## 2020-02-24 ENCOUNTER — ANTICOAGULATION THERAPY VISIT (OUTPATIENT)
Dept: ANTICOAGULATION | Facility: OTHER | Age: 85
End: 2020-02-24
Payer: COMMERCIAL

## 2020-02-24 DIAGNOSIS — Z79.01 LONG TERM CURRENT USE OF ANTICOAGULANT THERAPY: ICD-10-CM

## 2020-02-24 DIAGNOSIS — I48.20 CHRONIC ATRIAL FIBRILLATION (H): Primary | ICD-10-CM

## 2020-02-24 LAB — INR POINT OF CARE: 2.7 (ref 0.86–1.14)

## 2020-02-24 PROCEDURE — 36416 COLLJ CAPILLARY BLOOD SPEC: CPT

## 2020-02-24 PROCEDURE — 85610 PROTHROMBIN TIME: CPT | Mod: QW

## 2020-02-24 PROCEDURE — 99207 ZZC NO CHARGE NURSE ONLY: CPT

## 2020-02-24 NOTE — PROGRESS NOTES
ANTICOAGULATION FOLLOW-UP CLINIC VISIT    Patient Name:  Kevin Partida  Date:  2020  Contact Type:  Face to Face    SUBJECTIVE:  Patient Findings     Comments:   The patient was assessed for diet, medication, and activity level changes, missed or extra doses, bruising or bleeding, with no problem findings.          Clinical Outcomes     Negatives:   Major bleeding event, Thromboembolic event, Anticoagulation-related hospital admission, Anticoagulation-related ED visit, Anticoagulation-related fatality    Comments:   The patient was assessed for diet, medication, and activity level changes, missed or extra doses, bruising or bleeding, with no problem findings.             OBJECTIVE    INR Protime   Date Value Ref Range Status   2020 2.7 (A) 0.86 - 1.14 Final       ASSESSMENT / PLAN  INR assessment THER    Recheck INR In: 6 WEEKS    INR Location Clinic      Anticoagulation Summary  As of 2020    INR goal:   2.0-3.0   TTR:   87.7 % (1 y)   INR used for dosin.7 (2020)   Warfarin maintenance plan:   7.5 mg (5 mg x 1.5) every Mon; 5 mg (5 mg x 1) all other days   Full warfarin instructions:   7.5 mg every Mon; 5 mg all other days   Weekly warfarin total:   37.5 mg   No change documented:   Diego Almanzar, RN   Plan last modified:   Diego Almanzar RN (2019)   Next INR check:   2020   Priority:   Maintenance   Target end date:       Indications    Long term current use of anticoagulant therapy [Z79.01]             Anticoagulation Episode Summary     INR check location:       Preferred lab:       Send INR reminders to:   [facility]    Comments:   5 mg tabs, PM dose, print out, BP      Anticoagulation Care Providers     Provider Role Specialty Phone number    Chad Gee DO [facility] Internal Medicine [phone]            See the Encounter Report to view Anticoagulation Flowsheet and Dosing Calendar (Go to Encounters tab in chart review, and find the  Anticoagulation Therapy Visit)    Dosage adjustment made based on physician directed care plan.    Diego Almanzar RN

## 2020-03-27 DIAGNOSIS — I25.5 ISCHEMIC CARDIOMYOPATHY: ICD-10-CM

## 2020-03-27 RX ORDER — FUROSEMIDE 20 MG
20 TABLET ORAL DAILY
Qty: 90 TABLET | Refills: 0 | Status: SHIPPED | OUTPATIENT
Start: 2020-03-27 | End: 2020-06-22

## 2020-04-03 DIAGNOSIS — I48.20 CHRONIC ATRIAL FIBRILLATION (H): ICD-10-CM

## 2020-04-03 RX ORDER — WARFARIN SODIUM 5 MG/1
TABLET ORAL
Qty: 95 TABLET | Refills: 0 | Status: SHIPPED | OUTPATIENT
Start: 2020-04-03 | End: 2020-07-03

## 2020-04-06 ENCOUNTER — TELEPHONE (OUTPATIENT)
Dept: ANTICOAGULATION | Facility: OTHER | Age: 85
End: 2020-04-06

## 2020-04-06 ENCOUNTER — ANTICOAGULATION THERAPY VISIT (OUTPATIENT)
Dept: ANTICOAGULATION | Facility: OTHER | Age: 85
End: 2020-04-06

## 2020-04-06 DIAGNOSIS — I48.20 CHRONIC ATRIAL FIBRILLATION (H): ICD-10-CM

## 2020-04-06 DIAGNOSIS — Z79.01 LONG TERM CURRENT USE OF ANTICOAGULANT THERAPY: ICD-10-CM

## 2020-04-06 LAB
CAPILLARY BLOOD COLLECTION: NORMAL
INR BLD: 2.6 (ref 0.86–1.14)

## 2020-04-06 PROCEDURE — 85610 PROTHROMBIN TIME: CPT | Mod: QW | Performed by: INTERNAL MEDICINE

## 2020-04-06 PROCEDURE — 99207 ZZC NO CHARGE NURSE ONLY: CPT | Performed by: INTERNAL MEDICINE

## 2020-04-06 PROCEDURE — 36416 COLLJ CAPILLARY BLOOD SPEC: CPT | Performed by: INTERNAL MEDICINE

## 2020-04-06 NOTE — PROGRESS NOTES
ANTICOAGULATION FOLLOW-UP CLINIC VISIT    Patient Name:  Kevin Partida  Date:  2020  Contact Type:  Telephone    SUBJECTIVE:  Patient Findings     Comments:   The patient was assessed for diet, medication, and activity level changes, missed or extra doses, bruising or bleeding, with no problem findings.          Clinical Outcomes     Negatives:   Major bleeding event, Thromboembolic event, Anticoagulation-related hospital admission, Anticoagulation-related ED visit, Anticoagulation-related fatality    Comments:   The patient was assessed for diet, medication, and activity level changes, missed or extra doses, bruising or bleeding, with no problem findings.             OBJECTIVE    INR Point of Care   Date Value Ref Range Status   2020 2.6 (H) 0.86 - 1.14 Final     Comment:     This test is intended for monitoring Coumadin therapy.  Results are not   accurate in patients with prolonged INR due to factor deficiency.         ASSESSMENT / PLAN  INR assessment THER    Recheck INR In: 8 WEEKS    INR Location Outside lab      Anticoagulation Summary  As of 2020    INR goal:   2.0-3.0   TTR:   92.8 % (1 y)   INR used for dosin.6 (2020)   Warfarin maintenance plan:   7.5 mg (5 mg x 1.5) every Mon; 5 mg (5 mg x 1) all other days   Full warfarin instructions:   7.5 mg every Mon; 5 mg all other days   Weekly warfarin total:   37.5 mg   No change documented:   Diego Almanzar, RN   Plan last modified:   Diego Almanzar, RN (2019)   Next INR check:   2020   Priority:   Maintenance   Target end date:       Indications    Long term current use of anticoagulant therapy [Z79.01]             Anticoagulation Episode Summary     INR check location:       Preferred lab:       Send INR reminders to:   [facility]    Comments:   5 mg tabs, PM dose, print out, BP      Anticoagulation Care Providers     Provider Role Specialty Phone number    Chad Gee DO Responsible Internal  Medicine [phone]            See the Encounter Report to view Anticoagulation Flowsheet and Dosing Calendar (Go to Encounters tab in chart review, and find the Anticoagulation Therapy Visit)    Dosage adjustment made based on physician directed care plan.    Diego Almanzar RN

## 2020-04-06 NOTE — TELEPHONE ENCOUNTER
Attempted to contact pt regarding INR of 2.6 from 4/6. VM left to call [facility] back to discuss    Janis Green RN

## 2020-04-09 DIAGNOSIS — I48.20 CHRONIC ATRIAL FIBRILLATION (H): ICD-10-CM

## 2020-04-09 RX ORDER — WARFARIN SODIUM 5 MG/1
TABLET ORAL
Qty: 95 TABLET | Refills: 0 | Status: SHIPPED | OUTPATIENT
Start: 2020-04-09 | End: 2020-10-08

## 2020-04-09 NOTE — TELEPHONE ENCOUNTER
"Requested Prescriptions   Pending Prescriptions Disp Refills     warfarin ANTICOAGULANT (JANTOVEN ANTICOAGULANT) 5 MG tablet [Pharmacy Med Name: JANTOVEN 5MG TABS] 95 tablet 0     Sig: TAKE ONE AND ONE-HALF TABLETS (7.5MG) BY MOUTH ON MONDAYS AND ONE TABLET (5MG)  ALL OTHER DAYS OR AS DIRECTED BY COUMADIN CLINIC       Vitamin K Antagonists Failed - 4/9/2020  9:33 AM        Failed - INR is within goal in the past 6 weeks     Confirm INR is within goal in the past 6 weeks.     Recent Labs   Lab Test 04/06/20  0919   INR 2.6*                       Passed - Recent (12 mo) or future (30 days) visit within the authorizing provider's specialty     Patient has had an office visit with the authorizing provider or a provider within the authorizing providers department within the previous 12 mos or has a future within next 30 days. See \"Patient Info\" tab in inbasket, or \"Choose Columns\" in Meds & Orders section of the refill encounter.              Passed - Medication is active on med list        Passed - Patient is 18 years of age or older           "

## 2020-04-17 DIAGNOSIS — I25.10 CORONARY ARTERY DISEASE INVOLVING NATIVE CORONARY ARTERY OF NATIVE HEART WITHOUT ANGINA PECTORIS: ICD-10-CM

## 2020-04-17 RX ORDER — LISINOPRIL 10 MG/1
10 TABLET ORAL DAILY
Qty: 90 TABLET | Refills: 0 | Status: SHIPPED | OUTPATIENT
Start: 2020-04-17 | End: 2020-07-14

## 2020-04-24 ENCOUNTER — TRANSFERRED RECORDS (OUTPATIENT)
Dept: HEALTH INFORMATION MANAGEMENT | Facility: CLINIC | Age: 85
End: 2020-04-24

## 2020-05-04 DIAGNOSIS — E78.5 HYPERLIPIDEMIA LDL GOAL <70: ICD-10-CM

## 2020-05-04 RX ORDER — SIMVASTATIN 80 MG
80 TABLET ORAL DAILY
Qty: 90 TABLET | Refills: 3 | Status: SHIPPED | OUTPATIENT
Start: 2020-05-04 | End: 2021-04-19

## 2020-05-13 ENCOUNTER — VIRTUAL VISIT (OUTPATIENT)
Dept: CARDIOLOGY | Facility: CLINIC | Age: 85
End: 2020-05-13
Payer: COMMERCIAL

## 2020-05-13 VITALS
HEIGHT: 72 IN | BODY MASS INDEX: 31.83 KG/M2 | WEIGHT: 235 LBS | DIASTOLIC BLOOD PRESSURE: 85 MMHG | SYSTOLIC BLOOD PRESSURE: 122 MMHG

## 2020-05-13 DIAGNOSIS — I25.5 ISCHEMIC CARDIOMYOPATHY: ICD-10-CM

## 2020-05-13 DIAGNOSIS — I25.10 CORONARY ARTERY DISEASE INVOLVING NATIVE CORONARY ARTERY OF NATIVE HEART WITHOUT ANGINA PECTORIS: ICD-10-CM

## 2020-05-13 DIAGNOSIS — I10 HYPERTENSION GOAL BP (BLOOD PRESSURE) < 140/90: ICD-10-CM

## 2020-05-13 DIAGNOSIS — I48.20 CHRONIC ATRIAL FIBRILLATION (H): ICD-10-CM

## 2020-05-13 DIAGNOSIS — R06.09 DYSPNEA ON EXERTION: ICD-10-CM

## 2020-05-13 DIAGNOSIS — E78.5 HYPERLIPIDEMIA LDL GOAL <70: Primary | ICD-10-CM

## 2020-05-13 DIAGNOSIS — I50.22 CHRONIC SYSTOLIC HEART FAILURE (H): ICD-10-CM

## 2020-05-13 DIAGNOSIS — J43.9 PULMONARY EMPHYSEMA, UNSPECIFIED EMPHYSEMA TYPE (H): ICD-10-CM

## 2020-05-13 PROCEDURE — 99214 OFFICE O/P EST MOD 30 MIN: CPT | Mod: 95 | Performed by: INTERNAL MEDICINE

## 2020-05-13 ASSESSMENT — MIFFLIN-ST. JEOR: SCORE: 1792.92

## 2020-05-13 NOTE — LETTER
5/13/2020    Chad Gee DO    [address] [address]     RE: Kevin Partida  [address] [facility] [id number]       Dear Colleague,    Thank you for the opportunity to participate in the care of your patient, Kevin Partida, at the [facility] at [facility]. Please see a copy of my visit note below.     CARDIOLOGY OFFICE TELEPHONE PROGRESS NOTE       HISTORY OF PRESENT ILLNESS:  I had the opportunity to telephone visit with Mr. Moshe Partida today in Cardiology Clinic today at [facility] in [place].  Mr. Partida is an 85-year-old gentleman with a history of ischemic cardiomyopathy, chronic systolic heart failure and COPD.  He also has chronic atrial fibrillation which we have treated with a strategy of rate control and anticoagulation.  He has undergone cardioversions in the past but atrial fibrillation has continued to recur and since he remains asymptomatic from an atrial fibrillation standpoint, we have not tried additional cardioversion treatments.      His left ventricular function has not improved significantly with medical therapy.  He has had an ejection fraction of about 30%-35% over the course of the last 9 years and sometimes has appeared a bit lower.  He has had good rate control with his atrial fibrillation and I suspect that this is due to significant coronary artery disease and previous infarction.      However, he has consistently refused implantation of a defibrillator for primary prevention of cardiac arrest.  He continues to remain active, although seems to be more limited by his back and knee pains and has to stop frequently to rest because of these issues.  He is more short of breath now than he was previously.  This has been a slow process which he thinks is probably related more to his COPD.  He has gotten a couple of inhalers that  seem to help a little bit but he has a chronic productive cough without fevers or other infectious signs.  He was able to quit smoking eventually.  He had gained a little weight after stopping smoking but now his weight is down a few pounds since our last visit in November.  He has no new chest pains, except for some bruising of his ribs while trying to replace his battery recently in his car.      PHYSICAL EXAMINATION:  His blood pressure today was 122/85 and weight 235 pounds.      IMPRESSIONS:  Mr. Moshe Partida is an 85-year-old gentleman with chronic ischemic cardiomyopathy with moderately severe left ventricular dysfunction, ejection fraction 30%-35% with chronic mild systolic heart failure.  He seems to have done well on a low dose of Lasix, just 20 mg a day.  Unfortunately, his left ventricular function has not improved with medical therapy.  He has chronic atrial fibrillation with good rate control and remains on warfarin for stroke prevention.  He has chronic COPD as well and just recently quit smoking about a year or two ago.  I suspect this is the predominant cause of his worsening shortness of breath now.  His blood pressure and heart rates have been good.      I recommended that he stay on his current medication program for now but I think we should update some of his laboratory studies including an NT-proBNP to help determine whether his heart failure is worsening.  I will also check his CBC, basic metabolic panel and fasting lipid panel at the same time.  We will do that when he comes in for his next INR check.      He was interested in reassessing his left ventricular function and I am too but I think I will defer that for a few months to make it as safe as possible for him.  He certainly is at high risk for any virus complications.  I will recheck his LV function in about 3 months with an echo and plan on visiting with him directly in 6 months.     Please do not hesitate to contact me if you have  any questions/concerns.     Sincerely,     JAI EPPS MD

## 2020-05-13 NOTE — PROGRESS NOTES
Service Date: 05/13/2020      CARDIOLOGY OFFICE TELEPHONE PROGRESS NOTE       HISTORY OF PRESENT ILLNESS:  I had the opportunity to telephone visit with Mr. Moshe Partida today in Cardiology Clinic today at [facility] in [place].  Mr. Partida is an 85-year-old gentleman with a history of ischemic cardiomyopathy, chronic systolic heart failure and COPD.  He also has chronic atrial fibrillation which we have treated with a strategy of rate control and anticoagulation.  He has undergone cardioversions in the past but atrial fibrillation has continued to recur and since he remains asymptomatic from an atrial fibrillation standpoint, we have not tried additional cardioversion treatments.      His left ventricular function has not improved significantly with medical therapy.  He has had an ejection fraction of about 30%-35% over the course of the last 9 years and sometimes has appeared a bit lower.  He has had good rate control with his atrial fibrillation and I suspect that this is due to significant coronary artery disease and previous infarction.      However, he has consistently refused implantation of a defibrillator for primary prevention of cardiac arrest.  He continues to remain active, although seems to be more limited by his back and knee pains and has to stop frequently to rest because of these issues.  He is more short of breath now than he was previously.  This has been a slow process which he thinks is probably related more to his COPD.  He has gotten a couple of inhalers that seem to help a little bit but he has a chronic productive cough without fevers or other infectious signs.  He was able to quit smoking eventually.  He had gained a little weight after stopping smoking but now his weight is down a few pounds since our last visit in November.  He has no new chest pains, except for some bruising of his ribs while trying to replace his battery recently in his car.      PHYSICAL  EXAMINATION:  His blood pressure today was 122/85 and weight 235 pounds.      IMPRESSIONS:  Mr. Moshe Partida is an 85-year-old gentleman with chronic ischemic cardiomyopathy with moderately severe left ventricular dysfunction, ejection fraction 30%-35% with chronic mild systolic heart failure.  He seems to have done well on a low dose of Lasix, just 20 mg a day.  Unfortunately, his left ventricular function has not improved with medical therapy.  He has chronic atrial fibrillation with good rate control and remains on warfarin for stroke prevention.  He has chronic COPD as well and just recently quit smoking about a year or two ago.  I suspect this is the predominant cause of his worsening shortness of breath now.  His blood pressure and heart rates have been good.      I recommended that he stay on his current medication program for now but I think we should update some of his laboratory studies including an NT-proBNP to help determine whether his heart failure is worsening.  I will also check his CBC, basic metabolic panel and fasting lipid panel at the same time.  We will do that when he comes in for his next INR check.      He was interested in reassessing his left ventricular function and I am too but I think I will defer that for a few months to make it as safe as possible for him.  He certainly is at high risk for any virus complications.  I will recheck his LV function in about 3 months with an echo and plan on visiting with him directly in 6 months.      Jai Epps MD, FACC       cc:   [place] [address]         JAI EPPS MD, FACC             D: 2020   T: 2020   MT: SAÚL      Name:     MAHIN PARTIDA   MRN:      [id number]        Account:      [id number]   :      1935           Service Date: 2020      Document: [id number]

## 2020-05-13 NOTE — PROGRESS NOTES
"Kevin Partida is a 85 year old male who is being evaluated via a billable telephone visit.      The patient has been notified of following:     \"This telephone visit will be conducted via a call between you and your physician/provider. We have found that certain health care needs can be provided without the need for a physical exam.  This service lets us provide the care you need with a short phone conversation.  If a prescription is necessary we can send it directly to your pharmacy.  If lab work is needed we can place an order for that and you can then stop by our lab to have the test done at a later time.    Telephone visits are billed at different rates depending on your insurance coverage. During this emergency period, for some insurers they may be billed the same as an in-person visit.  Please reach out to your insurance provider with any questions.    If during the course of the call the physician/provider feels a telephone visit is not appropriate, you will not be charged for this service.\"    Patient has given verbal consent for Telephone visit? Yes    What phone number would you like to be contacted at? [phone]    How would you like to obtain your AVS? Mail a copy    Bp:122/85  Pulse:N/A  Wt:235.0lb    Review Of Systems  Skin: NEGATIVE  Eyes:Ears/Nose/Throat: NEGATIVE  Respiratory: COPD  Cardiovascular:Chest pain (right side)  Gastrointestinal: NEGATIVE  Genitourinary:NEGATIVE   Musculoskeletal: NEGATIVE  Neurologic: NEGATIVE  Psychiatric: NEGATIVE  Hematologic/Lymphatic/Immunologic: NEGATIVE  Endocrine:  NEGATIVE    Kierra SINGER    Phone call duration: 12 minutes    JAI EPPS MD    HPI and Plan:   See dictation    Orders Placed This Encounter   Procedures     Basic metabolic panel     Hemoglobin     N terminal pro BNP outpatient     Lipid panel reflex to direct LDL Fasting     Follow-Up with [facility] - Return MD visit     Echocardiogram Complete     No orders of the defined " types were placed in this encounter.    There are no discontinued medications.      Encounter Diagnoses   Name Primary?     Hyperlipidemia LDL goal <70 Yes     Coronary artery disease involving native coronary artery of native heart without angina pectoris      Ischemic cardiomyopathy      Chronic atrial fibrillation      Hypertension goal BP (blood pressure) < 140/90      Pulmonary emphysema, unspecified emphysema type (H)      Chronic systolic heart failure (H)      Dyspnea on exertion        CURRENT MEDICATIONS:  Current Outpatient Medications   Medication Sig Dispense Refill     aspirin 81 MG tablet Take 1 tablet by mouth daily.       carvedilol (COREG) 12.5 MG tablet Take 1 tablet (12.5 mg) by mouth 2 times daily (with meals) 180 tablet 3     finasteride (PROSCAR) 5 MG tablet TAKE ONE TABLET BY MOUTH ONCE DAILY 30 tablet 8     furosemide (LASIX) 20 MG tablet Take 1 tablet (20 mg) by mouth daily 90 tablet 0     lisinopril (ZESTRIL) 10 MG tablet Take 1 tablet (10 mg) by mouth daily 90 tablet 0     simvastatin (ZOCOR) 80 MG tablet Take 1 tablet (80 mg) by mouth daily 90 tablet 3     warfarin (JANTOVEN) 5 MG tablet Take 7.5 mg on Monday and 5 mg all other days, or as directed by the [facility] 95 tablet 0     warfarin ANTICOAGULANT (JANTOVEN ANTICOAGULANT) 5 MG tablet TAKE ONE AND ONE-HALF TABLETS (7.5MG) BY MOUTH ON MONDAYS AND ONE TABLET (5MG)  ALL OTHER DAYS OR AS DIRECTED BY COUMADIN [facility] 95 tablet 0     warfarin ANTICOAGULANT (JANTOVEN ANTICOAGULANT) 5 MG tablet TAKE ONE AND ONE-HALF TABLETS (7.5MG) BY MOUTH ON MONDAYS AND ONE TABLET (5MG)  ALL OTHER DAYS OR AS DIRECTED BY COUMADIN [facility] 95 tablet 0     warfarin ANTICOAGULANT (JANTOVEN ANTICOAGULANT) 5 MG tablet TAKE ONE AND ONE-HALF TABLETS BY MOUTH ON MONDAY AND TAKE ONE TABLET BY MOUTH ALL OTHER DAYS OR AS DIRECTED BY COUMADIN [facility] 95 tablet 0     warfarin ANTICOAGULANT (JANTOVEN ANTICOAGULANT) 5 MG tablet TAKE ONE AND ONE-HALF TABLETS BY MOUTH ON  MONDAY AND TAKE ONE TABLET BY MOUTH ALL OTHER DAYS OR AS DIRECTED BY COUMADIN CLINIC 95 tablet 0       ALLERGIES     Allergies   Allergen Reactions     Codeine GI Disturbance     Niacin Hives     got very red and flushed.  Doesn't want       PAST MEDICAL HISTORY:  Past Medical History:   Diagnosis Date     Atrial fibrillation (H) 07    Admit. Discharged 04/15/07     Atrial flutter (H)      Coronary artery disease      Headache(784.0)      Hepatitis, unspecified      Hyperlipidaemia      Hypertension      Measles without mention of complication     Measles     Mumps without mention of complication     Mumps     Orchitis and epididymitis, unspecified      Other and unspecified hyperlipidemia      Other emphysema (H)      Other speech disturbance     Stuttering     Pneumonia, organism unspecified(486)     Pneumonia     Shortness of breath      Urinary obstruction      Varicella without mention of complication     Chickenpox       PAST SURGICAL HISTORY:  Past Surgical History:   Procedure Laterality Date     COLONOSCOPY  2011    Procedure:COLONOSCOPY; colonoscopy; Surgeon:IVETH WIGGINS; Location: GI     HC COLONOSCOPY W/WO BRUSH/WASH  06     HC LAPAROSCOPY, SURGICAL; CHOLECYSTECTOMY      Cholecystectomy, Laparoscopic     HC REMOVAL OF TONSILS,<11 Y/O      Tonsils <12y.o.     LAMINECT/DISCECTOMY, LUMBAR  08    Right L4-L5 microlumbar diskectomy.       FAMILY HISTORY:  Family History   Problem Relation Age of Onset     Alzheimer Disease Mother         ?dimentia or alzheimers     Diabetes Mother         insulin     EYE* Mother         cataract     Cerebrovascular Disease Mother      Cancer Father         lymph tumor of glands     Diabetes Maternal Aunt         ? oral or insulin     Diabetes Other         4 cousins insulin dependent     Genetic Disorder Maternal Grandmother         tremors     Heart Disease Paternal Uncle         ? at age 60-70     Neurologic Disorder Paternal Uncle          parkinsons     Cerebrovascular Disease Paternal Uncle         ? dued at age 60-70       SOCIAL HISTORY:  Social History     Socioeconomic History     Marital status:      Spouse name: Irlanda     Number of children: 2     Years of education: 12+     Highest education level: None   Occupational History     Occupation: retired telephone tech     Employer: [employer]   Social Needs     Financial resource strain: None     Food insecurity     Worry: None     Inability: None     Transportation needs     Medical: None     Non-medical: None   Tobacco Use     Smoking status: Former Smoker     Packs/day: 0.50     Types: Cigarettes     Smokeless tobacco: Never Used     Tobacco comment: smoked for 57 years- since age 9   Substance and Sexual Activity     Alcohol use: Yes     Alcohol/week: 0.0 standard drinks     Comment: socially     Drug use: No     Sexual activity: Not Currently     Partners: Female   Lifestyle     Physical activity     Days per week: None     Minutes per session: None     Stress: None   Relationships     Social connections     Talks on phone: None     Gets together: None     Attends Protestant service: None     Active member of club or organization: None     Attends meetings of clubs or organizations: None     Relationship status: None     Intimate partner violence     Fear of current or ex partner: None     Emotionally abused: None     Physically abused: None     Forced sexual activity: None   Other Topics Concern      Service Yes     Comment: [employer] x 8 yrs     Blood Transfusions No     Caffeine Concern No     Comment: 6 cups coffee/day     Occupational Exposure No     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     Hobby Hazards Yes     Comment: [employer],hunting,works on trucks and cars      Sleep Concern No     Stress Concern No     Weight Concern No     Special Diet No     Back Care No     Exercise Yes     Comment: outside work, cuts wood, yard work,  "shovels snow off roof     Bike Helmet No     Seat Belt Yes     Self-Exams Yes     Parent/sibling w/ CABG, MI or angioplasty before 65F 55M? Yes   Social History Narrative     None       Physical Exam:  Vitals: /85   Ht 1.835 m (6' 0.25\")   Wt 106.6 kg (235 lb)   BMI 31.65 kg/m      Constitutional:           Skin:             Head:           Eyes:           Lymph:      ENT:           Neck:           Respiratory:            Cardiac:                                                           GI:           Extremities and Muscular Skeletal:                 Neurological:           Psych:         Recent Lab Results:  LIPID RESULTS:  Lab Results   Component Value Date    CHOL 134 03/18/2019    HDL 37 (L) 03/18/2019    LDL 59 03/18/2019    TRIG 190 (H) 03/18/2019    CHOLHDLRATIO 3.1 04/29/2015       LIVER ENZYME RESULTS:  Lab Results   Component Value Date    AST 18 11/19/2019    ALT 22 11/19/2019       CBC RESULTS:  Lab Results   Component Value Date    WBC 7.7 11/19/2019    RBC 5.01 11/19/2019    HGB 15.8 11/19/2019    HCT 48.5 11/19/2019    MCV 97 11/19/2019    MCH 31.5 11/19/2019    MCHC 32.6 11/19/2019    RDW 14.2 11/19/2019     11/19/2019       BMP RESULTS:  Lab Results   Component Value Date     11/19/2019    POTASSIUM 4.6 11/19/2019    CHLORIDE 106 11/19/2019    CO2 31 11/19/2019    ANIONGAP 4 11/19/2019     (H) 11/19/2019    BUN 18 11/19/2019    CR 0.95 11/19/2019    GFRESTIMATED 73 11/19/2019    GFRESTBLACK 84 11/19/2019    JO-ANN 8.6 11/19/2019        A1C RESULTS:  Lab Results   Component Value Date    A1C 5.9 02/06/2014       INR RESULTS:  Lab Results   Component Value Date    INR 2.6 (H) 04/06/2020    INR 2.7 (A) 02/24/2020    INR 2.7 (A) 01/13/2020    INR 2.67 (H) 12/20/2018    INR 2.66 (H) 09/28/2016           CC  No referring provider defined for this encounter.                  "

## 2020-05-14 DIAGNOSIS — I25.10 CORONARY ARTERY DISEASE INVOLVING NATIVE CORONARY ARTERY WITHOUT ANGINA PECTORIS: ICD-10-CM

## 2020-05-14 RX ORDER — CARVEDILOL 12.5 MG/1
12.5 TABLET ORAL 2 TIMES DAILY WITH MEALS
Qty: 180 TABLET | Refills: 3 | Status: SHIPPED | OUTPATIENT
Start: 2020-05-14 | End: 2021-05-03

## 2020-05-20 ENCOUNTER — ANTICOAGULATION THERAPY VISIT (OUTPATIENT)
Dept: ANTICOAGULATION | Facility: OTHER | Age: 85
End: 2020-05-20

## 2020-05-20 ENCOUNTER — TELEPHONE (OUTPATIENT)
Dept: ANTICOAGULATION | Facility: OTHER | Age: 85
End: 2020-05-20

## 2020-05-20 ENCOUNTER — CARE COORDINATION (OUTPATIENT)
Dept: CARDIOLOGY | Facility: CLINIC | Age: 85
End: 2020-05-20

## 2020-05-20 DIAGNOSIS — Z79.01 LONG TERM CURRENT USE OF ANTICOAGULANT THERAPY: ICD-10-CM

## 2020-05-20 DIAGNOSIS — E78.5 HYPERLIPIDEMIA LDL GOAL <70: ICD-10-CM

## 2020-05-20 DIAGNOSIS — I50.22 CHRONIC SYSTOLIC HEART FAILURE (H): ICD-10-CM

## 2020-05-20 DIAGNOSIS — I48.20 CHRONIC ATRIAL FIBRILLATION (H): ICD-10-CM

## 2020-05-20 DIAGNOSIS — R06.09 DYSPNEA ON EXERTION: ICD-10-CM

## 2020-05-20 LAB
ANION GAP SERPL CALCULATED.3IONS-SCNC: 3 MMOL/L (ref 3–14)
BUN SERPL-MCNC: 27 MG/DL (ref 7–30)
CALCIUM SERPL-MCNC: 8.3 MG/DL (ref 8.5–10.1)
CHLORIDE SERPL-SCNC: 111 MMOL/L (ref 94–109)
CHOLEST SERPL-MCNC: 148 MG/DL
CO2 SERPL-SCNC: 28 MMOL/L (ref 20–32)
CREAT SERPL-MCNC: 1.08 MG/DL (ref 0.66–1.25)
GFR SERPL CREATININE-BSD FRML MDRD: 62 ML/MIN/{1.73_M2}
GLUCOSE SERPL-MCNC: 115 MG/DL (ref 70–99)
HDLC SERPL-MCNC: 30 MG/DL
HGB BLD-MCNC: 16.2 G/DL (ref 13.3–17.7)
INR PPP: 2.55 (ref 0.86–1.14)
LDLC SERPL CALC-MCNC: 68 MG/DL
NONHDLC SERPL-MCNC: 118 MG/DL
NT-PROBNP SERPL-MCNC: 968 PG/ML (ref 0–450)
POTASSIUM SERPL-SCNC: 4.6 MMOL/L (ref 3.4–5.3)
SODIUM SERPL-SCNC: 142 MMOL/L (ref 133–144)
TRIGL SERPL-MCNC: 251 MG/DL

## 2020-05-20 PROCEDURE — 83880 ASSAY OF NATRIURETIC PEPTIDE: CPT | Performed by: INTERNAL MEDICINE

## 2020-05-20 PROCEDURE — 80061 LIPID PANEL: CPT | Performed by: INTERNAL MEDICINE

## 2020-05-20 PROCEDURE — 99207 ZZC NO CHARGE NURSE ONLY: CPT | Performed by: INTERNAL MEDICINE

## 2020-05-20 PROCEDURE — 85610 PROTHROMBIN TIME: CPT | Performed by: INTERNAL MEDICINE

## 2020-05-20 PROCEDURE — 85018 HEMOGLOBIN: CPT | Performed by: INTERNAL MEDICINE

## 2020-05-20 PROCEDURE — 36415 COLL VENOUS BLD VENIPUNCTURE: CPT | Performed by: INTERNAL MEDICINE

## 2020-05-20 PROCEDURE — 80048 BASIC METABOLIC PNL TOTAL CA: CPT | Performed by: INTERNAL MEDICINE

## 2020-05-20 NOTE — TELEPHONE ENCOUNTER
Labs look ok. We have no baseline for his NTpBNP, but it is not unexpectedly high. No changes for now. EE

## 2020-05-20 NOTE — PROGRESS NOTES
"Lab results from 5/20/20 noted. Pt had virtual visit with  on 5/13/20. Per , \"I recommended that he stay on his current medication program for now but I think we should update some of his laboratory studies including an NT-proBNP to help determine whether his heart failure is worsening.  I will also check his CBC, basic metabolic panel and fasting lipid panel at the same time.\"    Will message  with results, and call pt with any other recommendations.   Debbie MORE May 20, 2020, 11:38 AM    Component      Latest Ref Rng & Units 5/20/2020   Sodium      133 - 144 mmol/L 142   Potassium      3.4 - 5.3 mmol/L 4.6   Chloride      94 - 109 mmol/L 111 (H)   Carbon Dioxide      20 - 32 mmol/L 28   Anion Gap      3 - 14 mmol/L 3   Glucose      70 - 99 mg/dL 115 (H)   Urea Nitrogen      7 - 30 mg/dL 27   Creatinine      0.66 - 1.25 mg/dL 1.08   GFR Estimate      >60 mL/min/1.73:m2 62   GFR Estimate If Black      >60 mL/min/1.73:m2 72   Calcium      8.5 - 10.1 mg/dL 8.3 (L)   Cholesterol      <200 mg/dL 148   Triglycerides      <150 mg/dL 251 (H)   HDL Cholesterol      >39 mg/dL 30 (L)   LDL Cholesterol Calculated      <100 mg/dL 68   Non HDL Cholesterol      <130 mg/dL 118   N-Terminal Pro Bnp      0 - 450 pg/mL 968 (H)   Hemoglobin      13.3 - 17.7 g/dL 16.2       "

## 2020-05-20 NOTE — PROGRESS NOTES
ANTICOAGULATION FOLLOW-UP CLINIC VISIT    Patient Name:  Kevin Partida  Date:  2020  Contact Type:  Telephone    SUBJECTIVE:  Patient Findings     Comments:   The patient was assessed for diet, medication, and activity level changes, missed or extra doses, bruising or bleeding, with no problem findings.          Clinical Outcomes     Comments:   The patient was assessed for diet, medication, and activity level changes, missed or extra doses, bruising or bleeding, with no problem findings.             OBJECTIVE    Recent labs: (last 7 days)     20  1005   INR 2.55*       ASSESSMENT / PLAN  INR assessment THER    Recheck INR In: 8 WEEKS    INR Location Outside lab      Anticoagulation Summary  As of 2020    INR goal:   2.0-3.0   TTR:   92.8 % (1 y)   INR used for dosin.55 (2020)   Warfarin maintenance plan:   7.5 mg (5 mg x 1.5) every Mon; 5 mg (5 mg x 1) all other days   Full warfarin instructions:   7.5 mg every Mon; 5 mg all other days   Weekly warfarin total:   37.5 mg   No change documented:   Nuha Murray RN   Plan last modified:   Diego Almanzar RN (2019)   Next INR check:   7/15/2020   Priority:   Maintenance   Target end date:       Indications    Long term current use of anticoagulant therapy [Z79.01]             Anticoagulation Episode Summary     INR check location:       Preferred lab:       Send INR reminders to:   [facility]    Comments:   5 mg tabs, PM dose, print out, BP      Anticoagulation Care Providers     Provider Role Specialty Phone number    Chad Gee DO [facility] [phone]            See the Encounter Report to view Anticoagulation Flowsheet and Dosing Calendar (Go to Encounters tab in chart review, and find the Anticoagulation Therapy Visit)    Dosage adjustment made based on physician directed care plan.    Nuha Murray RN

## 2020-05-20 NOTE — TELEPHONE ENCOUNTER
Attempted to contact pt regarding INR of 2.5 from 5/20. VM left to call [facility] back to discuss    Janis Green RN

## 2020-05-21 NOTE — PROGRESS NOTES
I left a message for pt to let him know  reviewed labs and felt they look ok. BNP slightly elevated, but that is not unexpected. No changed need to be made at this time. PT to call back with any other questions. Debbie MORE May 21, 2020, 2:56 PM

## 2020-06-22 DIAGNOSIS — I25.5 ISCHEMIC CARDIOMYOPATHY: ICD-10-CM

## 2020-06-22 RX ORDER — FUROSEMIDE 20 MG
20 TABLET ORAL DAILY
Qty: 90 TABLET | Refills: 3 | Status: SHIPPED | OUTPATIENT
Start: 2020-06-22 | End: 2021-06-18

## 2020-07-03 DIAGNOSIS — I48.20 CHRONIC ATRIAL FIBRILLATION (H): ICD-10-CM

## 2020-07-03 RX ORDER — WARFARIN SODIUM 5 MG/1
TABLET ORAL
Qty: 95 TABLET | Refills: 0 | Status: SHIPPED | OUTPATIENT
Start: 2020-07-03 | End: 2020-09-28

## 2020-07-14 DIAGNOSIS — I25.10 CORONARY ARTERY DISEASE INVOLVING NATIVE CORONARY ARTERY OF NATIVE HEART WITHOUT ANGINA PECTORIS: ICD-10-CM

## 2020-07-14 RX ORDER — LISINOPRIL 10 MG/1
10 TABLET ORAL DAILY
Qty: 90 TABLET | Refills: 1 | Status: SHIPPED | OUTPATIENT
Start: 2020-07-14 | End: 2021-01-04

## 2020-07-15 ENCOUNTER — ANTICOAGULATION THERAPY VISIT (OUTPATIENT)
Dept: ANTICOAGULATION | Facility: OTHER | Age: 85
End: 2020-07-15

## 2020-07-15 ENCOUNTER — TELEPHONE (OUTPATIENT)
Dept: ANTICOAGULATION | Facility: OTHER | Age: 85
End: 2020-07-15

## 2020-07-15 DIAGNOSIS — I48.20 CHRONIC ATRIAL FIBRILLATION (H): ICD-10-CM

## 2020-07-15 DIAGNOSIS — Z79.01 LONG TERM CURRENT USE OF ANTICOAGULANT THERAPY: ICD-10-CM

## 2020-07-15 LAB
CAPILLARY BLOOD COLLECTION: NORMAL
INR BLD: 3.7 (ref 0.86–1.14)

## 2020-07-15 PROCEDURE — 85610 PROTHROMBIN TIME: CPT | Mod: QW | Performed by: INTERNAL MEDICINE

## 2020-07-15 PROCEDURE — 36416 COLLJ CAPILLARY BLOOD SPEC: CPT | Performed by: INTERNAL MEDICINE

## 2020-07-15 PROCEDURE — 99207 ZZC NO CHARGE NURSE ONLY: CPT | Performed by: INTERNAL MEDICINE

## 2020-07-15 NOTE — TELEPHONE ENCOUNTER
I have attempted to contact this patient by phone, left message to return call at [phone] or [phone].  Will try again later.    Nuha Murray RN- [facility] RN

## 2020-07-16 NOTE — PROGRESS NOTES
ANTICOAGULATION FOLLOW-UP CLINIC VISIT    Patient Name:  Kevin Partida    Date:  7/16/2020  Contact Type:  Telephone    SUBJECTIVE:  Patient Findings     Comments:   I was unable to identify a reason for supratherapeutic INR.          Clinical Outcomes     Comments:   I was unable to identify a reason for supratherapeutic INR.             OBJECTIVE    Recent labs: (last 7 days)     07/15/20  0924   INR 3.7*       ASSESSMENT / PLAN  INR assessment SUPRA    Recheck INR In: 4 WEEKS    INR Location Outside lab      Anticoagulation Summary  As of 7/15/2020    INR goal:   2.0-3.0   TTR:   88.6 % (1 y)   INR used for dosing:   3.7! (7/15/2020)   Warfarin maintenance plan:   7.5 mg (5 mg x 1.5) every Mon; 5 mg (5 mg x 1) all other days   Full warfarin instructions:   7/16: 2.5 mg; Otherwise 7.5 mg every Mon; 5 mg all other days   Weekly warfarin total:   37.5 mg   Plan last modified:   Diego Almanzar RN (2/4/2019)   Next INR check:   8/13/2020   Priority:   Maintenance   Target end date:       Indications    Long term current use of anticoagulant therapy [Z79.01]             Anticoagulation Episode Summary     INR check location:       Preferred lab:       Send INR reminders to:   [facility]    Comments:   5 mg tabs, PM dose, print out, BP      Anticoagulation Care Providers     Provider Role Specialty Phone number    Chad GeeDO [facility] [phone]            See the Encounter Report to view Anticoagulation Flowsheet and Dosing Calendar (Go to Encounters tab in chart review, and find the Anticoagulation Therapy Visit)    Dosage adjustment made based on physician directed care plan.    Diego Almanzar RN

## 2020-07-24 DIAGNOSIS — I25.10 CORONARY ARTERY DISEASE INVOLVING NATIVE CORONARY ARTERY OF NATIVE HEART WITHOUT ANGINA PECTORIS: ICD-10-CM

## 2020-07-24 RX ORDER — LISINOPRIL 10 MG/1
10 TABLET ORAL DAILY
Qty: 90 TABLET | Refills: 1 | OUTPATIENT
Start: 2020-07-24

## 2020-07-24 NOTE — TELEPHONE ENCOUNTER
Prescription was sent 7/14/2020 for #90 with 1 refill.  Pharmacy notified via E-Prescribe refusal.     SHAISTA DanielleN, RN  [facility]

## 2020-08-13 ENCOUNTER — ANTICOAGULATION THERAPY VISIT (OUTPATIENT)
Dept: ANTICOAGULATION | Facility: OTHER | Age: 85
End: 2020-08-13

## 2020-08-13 DIAGNOSIS — I48.20 CHRONIC ATRIAL FIBRILLATION (H): ICD-10-CM

## 2020-08-13 DIAGNOSIS — Z79.01 LONG TERM CURRENT USE OF ANTICOAGULANT THERAPY: ICD-10-CM

## 2020-08-13 LAB
CAPILLARY BLOOD COLLECTION: NORMAL
INR BLD: 2.3 (ref 0.86–1.14)

## 2020-08-13 PROCEDURE — 36416 COLLJ CAPILLARY BLOOD SPEC: CPT | Performed by: INTERNAL MEDICINE

## 2020-08-13 PROCEDURE — 99207 ZZC NO CHARGE NURSE ONLY: CPT | Performed by: INTERNAL MEDICINE

## 2020-08-13 PROCEDURE — 85610 PROTHROMBIN TIME: CPT | Mod: QW | Performed by: INTERNAL MEDICINE

## 2020-08-13 NOTE — PROGRESS NOTES
ANTICOAGULATION FOLLOW-UP CLINIC VISIT    Patient Name:  Kevin Partida  Date:  2020  Contact Type:  Telephone    SUBJECTIVE:  Patient Findings     Comments:   The patient was assessed for diet, medication, and activity level changes, missed or extra doses, bruising or bleeding, with no problem findings.          Clinical Outcomes     Negatives:   Major bleeding event, Thromboembolic event, Anticoagulation-related hospital admission, Anticoagulation-related ED visit, Anticoagulation-related fatality    Comments:   The patient was assessed for diet, medication, and activity level changes, missed or extra doses, bruising or bleeding, with no problem findings.             OBJECTIVE    Recent labs: (last 7 days)     20  0851   INR 2.3*       ASSESSMENT / PLAN  INR assessment THER    Recheck INR In: 6 WEEKS    INR Location Outside lab      Anticoagulation Summary  As of 2020    INR goal:   2.0-3.0   TTR:   86.7 % (1 y)   INR used for dosin.3 (2020)   Warfarin maintenance plan:   7.5 mg (5 mg x 1.5) every Mon; 5 mg (5 mg x 1) all other days   Full warfarin instructions:   7.5 mg every Mon; 5 mg all other days   Weekly warfarin total:   37.5 mg   No change documented:   Diego Almanzar, RN   Plan last modified:   Diego Almanzar, RN (2019)   Next INR check:   2020   Priority:   Maintenance   Target end date:       Indications    Long term current use of anticoagulant therapy [Z79.01]             Anticoagulation Episode Summary     INR check location:       Preferred lab:       Send INR reminders to:   [facility]    Comments:   5 mg tabs, PM dose, print out, BP      Anticoagulation Care Providers     Provider Role Specialty Phone number    Chad Gee DO [facility] [phone]            See the Encounter Report to view Anticoagulation Flowsheet and Dosing Calendar (Go to Encounters tab in chart review, and find the Anticoagulation Therapy  Visit)    Dosage adjustment made based on physician directed care plan.    Diego Almanzar RN

## 2020-09-24 ENCOUNTER — ANTICOAGULATION THERAPY VISIT (OUTPATIENT)
Dept: ANTICOAGULATION | Facility: OTHER | Age: 85
End: 2020-09-24

## 2020-09-24 DIAGNOSIS — I48.20 CHRONIC ATRIAL FIBRILLATION (H): ICD-10-CM

## 2020-09-24 DIAGNOSIS — Z79.01 LONG TERM CURRENT USE OF ANTICOAGULANT THERAPY: ICD-10-CM

## 2020-09-24 LAB
CAPILLARY BLOOD COLLECTION: NORMAL
INR BLD: 2.8 (ref 0.86–1.14)

## 2020-09-24 PROCEDURE — 99207 ZZC NO CHARGE NURSE ONLY: CPT | Performed by: INTERNAL MEDICINE

## 2020-09-24 PROCEDURE — 36416 COLLJ CAPILLARY BLOOD SPEC: CPT | Performed by: INTERNAL MEDICINE

## 2020-09-24 PROCEDURE — 85610 PROTHROMBIN TIME: CPT | Mod: QW | Performed by: INTERNAL MEDICINE

## 2020-09-24 NOTE — PROGRESS NOTES
ANTICOAGULATION FOLLOW-UP CLINIC VISIT    Patient Name:  Kevin Partida  Date:  2020  Contact Type:  Telephone    SUBJECTIVE:  Patient Findings     Comments:   The patient was assessed for diet, medication, and activity level changes, missed or extra doses, bruising or bleeding, with no problem findings.          Clinical Outcomes     Negatives:   Major bleeding event, Thromboembolic event, Anticoagulation-related hospital admission, Anticoagulation-related ED visit, Anticoagulation-related fatality    Comments:   The patient was assessed for diet, medication, and activity level changes, missed or extra doses, bruising or bleeding, with no problem findings.             OBJECTIVE    Recent labs: (last 7 days)     20  0929   INR 2.8*       ASSESSMENT / PLAN  INR assessment THER    Recheck INR In: 6 WEEKS    INR Location Outside lab      Anticoagulation Summary  As of 2020    INR goal:   2.0-3.0   TTR:   86.7 % (1 y)   INR used for dosin.8 (2020)   Warfarin maintenance plan:   7.5 mg (5 mg x 1.5) every Mon; 5 mg (5 mg x 1) all other days   Full warfarin instructions:   7.5 mg every Mon; 5 mg all other days   Weekly warfarin total:   37.5 mg   No change documented:   Diego Almanzar, RN   Plan last modified:   Diego Almanzar, RN (2019)   Next INR check:   2020   Priority:   Maintenance   Target end date:       Indications    Long term current use of anticoagulant therapy [Z79.01]             Anticoagulation Episode Summary     INR check location:       Preferred lab:       Send INR reminders to:   [facility]    Comments:   5 mg tabs, PM dose, print out, BP      Anticoagulation Care Providers     Provider Role Specialty Phone number    Chad Gee DO [facility] [phone]            See the Encounter Report to view Anticoagulation Flowsheet and Dosing Calendar (Go to Encounters tab in chart review, and find the Anticoagulation Therapy  Visit)    Dosage adjustment made based on physician directed care plan.    Diego Almanzar RN

## 2020-09-27 DIAGNOSIS — I48.20 CHRONIC ATRIAL FIBRILLATION (H): ICD-10-CM

## 2020-09-28 RX ORDER — WARFARIN SODIUM 5 MG/1
TABLET ORAL
Qty: 95 TABLET | Refills: 0 | Status: SHIPPED | OUTPATIENT
Start: 2020-09-28 | End: 2020-12-29

## 2020-09-28 NOTE — TELEPHONE ENCOUNTER
"Warfarin  Last Written Prescription Date:  7/3/2020  Last Fill Quantity: 95,  # refills: 0   Last office visit: 11/19/2019 with prescribing provider:  Marlen   Future Office Visit:   Next 5 appointments (look out 90 days)    Sep 29, 2020 11:40 AM CDT  Pre-Op physical with Chad Gee,   [facility] [address] [place] [id number] [phone]           Requested Prescriptions   Pending Prescriptions Disp Refills     warfarin ANTICOAGULANT (JANTOVEN ANTICOAGULANT) 5 MG tablet [Pharmacy Med Name: JANTOVEN 5MG TABS] 95 tablet 0     Sig: TAKE ONE AND ONE-HALF TABLETS (7.5MG) BY MOUTH ON MONDAY AND ONE TABLET (5MG) ALL OTHER DAYS OR AS DIRECTED BY [facility] CLINIC       Vitamin K Antagonists Failed - 9/27/2020  5:02 AM        Failed - INR is within goal in the past 6 weeks     Confirm INR is within goal in the past 6 weeks.     Recent Labs   Lab Test 09/24/20  0929   INR 2.8*                       Passed - Recent (12 mo) or future (30 days) visit within the authorizing provider's specialty     Patient has had an office visit with the authorizing provider or a provider within the authorizing providers department within the previous 12 mos or has a future within next 30 days. See \"Patient Info\" tab in inbasket, or \"Choose Columns\" in Meds & Orders section of the refill encounter.              Passed - Medication is active on med list        Passed - Patient is 18 years of age or older           Catalina Amado RN on 9/28/2020 at 12:27 PM    "

## 2020-09-29 ENCOUNTER — OFFICE VISIT (OUTPATIENT)
Dept: INTERNAL MEDICINE | Facility: CLINIC | Age: 85
End: 2020-09-29
Payer: COMMERCIAL

## 2020-09-29 VITALS
DIASTOLIC BLOOD PRESSURE: 60 MMHG | HEART RATE: 78 BPM | OXYGEN SATURATION: 95 % | TEMPERATURE: 97.2 F | HEIGHT: 72 IN | RESPIRATION RATE: 22 BRPM | BODY MASS INDEX: 33.32 KG/M2 | SYSTOLIC BLOOD PRESSURE: 112 MMHG | WEIGHT: 246 LBS

## 2020-09-29 DIAGNOSIS — Z98.61 STATUS POST PERCUTANEOUS TRANSLUMINAL CORONARY ANGIOPLASTY: ICD-10-CM

## 2020-09-29 DIAGNOSIS — Z79.01 LONG TERM CURRENT USE OF ANTICOAGULANT THERAPY: ICD-10-CM

## 2020-09-29 DIAGNOSIS — Z01.818 PREOP GENERAL PHYSICAL EXAM: Primary | ICD-10-CM

## 2020-09-29 DIAGNOSIS — I25.5 ISCHEMIC CARDIOMYOPATHY: ICD-10-CM

## 2020-09-29 DIAGNOSIS — I25.10 CORONARY ARTERY DISEASE INVOLVING NATIVE CORONARY ARTERY OF NATIVE HEART WITHOUT ANGINA PECTORIS: ICD-10-CM

## 2020-09-29 DIAGNOSIS — I10 HYPERTENSION GOAL BP (BLOOD PRESSURE) < 140/90: ICD-10-CM

## 2020-09-29 DIAGNOSIS — I48.20 CHRONIC ATRIAL FIBRILLATION (H): ICD-10-CM

## 2020-09-29 DIAGNOSIS — H25.9 SENILE CATARACT, UNSPECIFIED AGE-RELATED CATARACT TYPE, UNSPECIFIED LATERALITY: ICD-10-CM

## 2020-09-29 PROCEDURE — 99214 OFFICE O/P EST MOD 30 MIN: CPT | Performed by: INTERNAL MEDICINE

## 2020-09-29 ASSESSMENT — MIFFLIN-ST. JEOR: SCORE: 1842.82

## 2020-09-29 ASSESSMENT — PAIN SCALES - GENERAL: PAINLEVEL: NO PAIN (0)

## 2020-09-29 NOTE — PROGRESS NOTES
[address] [address]  [phone]  Dept: [phone]    PRE-OP EVALUATION:  Today's date: 2020    Kevin Partida (: 1935) presents for pre-operative evaluation assessment as requested by Dr. Almanzar.  He requires evaluation and anesthesia risk assessment prior to undergoing surgery/procedure for treatment of cataracts .    Proposed Surgery/ Procedure: cataract   Date of Surgery/ Procedure: 10/8/2020 and 10/22/2020  Time of Surgery/ Procedure: TDB  Hospital/Surgical Facility: [facility]  Surgery Fax Number: [phone]  Primary Physician: Chad Gee  Type of Anesthesia Anticipated: to be determined    Preoperative Questionnaire:   No - Have you ever had a heart attack or stroke?  YES. 3 stents- Have you ever had surgery on your heart or blood vessels, such as a stent, coronary (heart) bypass, or surgery on an artery in the head, neck, heart, or legs?  No - Do you have chest pain when you are physically active?  No - Do you have a history of heart failure?  No - Do you currently have a cold, bronchitis, or symptoms of other respiratory (head and chest) infections?  COPD - Do you have a cough, shortness of breath, or wheezing?  No - Do you or anyone in your family have a history of blood clots?  No - Do you or anyone in your family have a serious bleeding problem, such as long-lasting bleeding after surgeries or cuts?  No - Have you ever had anemia or been told to take iron pills?  No - Have you had any abnormal blood loss such as black, tarry or bloody stools, or abnormal vaginal bleeding?  No - Have you ever had a blood transfusion?  Yes - Are you willing to have a blood transfusion if it is medically needed before, during, or after your surgery?  No - Have you or anyone in your family ever had problems with anesthesia (sedation for surgery)?  No - Do you have sleep apnea, excessive snoring, or daytime drowsiness?   No - Do  you have any artifical heart valves or other implanted medical devices, such as a pacemaker, defibrillator, or continuous glucose monitor?  No - Do you have any artifical joints?  No - Are you allergic to latex?  No - Is there any chance that you may be pregnant?    Patient has a Health Care Directive or Living Will:  YES on file    HPI:     HPI related to upcoming procedure: Patient has a history of decreased visual acuity due to the presence of bilateral cataract.  Is anticipating staged cataract extraction with intraocular lens implant placement.      See problem list for active medical problems.  Problems all longstanding and stable, except as noted/documented.  See ROS for pertinent symptoms related to these conditions.      MEDICAL HISTORY:     Patient Active Problem List    Diagnosis Date Noted     Rupture of right distal biceps tendon, initial encounter 12/22/2018     Priority: Medium     Chronic atrial fibrillation (H) 07/26/2016     Priority: Medium     Long term current use of anticoagulant therapy 03/14/2016     Priority: Medium     CAD (coronary artery disease) 05/19/2014     Priority: Medium     Advance Care Planning 09/12/2011     Priority: Medium     Advance Care Planning 12/5/2016: Receipt of ACP document:  Received: Health Care Directive which was witnessed or notarized on 4-28-16.  Document previously scanned on 9-30-16.  Validation form completed and sent to be scanned.  Code Status needs to be updated to reflect choices in most recent ACP document.  Confirmed/documented designated decision maker(s).  Added by Itzel Fuentes RN Advance Care Planning Liaison with [employer]  Discussed advance care planning with patient; information given to patient to review. 9/12/2011  Johana Fraga PharmD           Hypertension goal BP (blood pressure) < 140/90 07/12/2011     Priority: Medium     HYPERLIPIDEMIA LDL GOAL <100 10/31/2010     Priority: Medium     Cardiovascular disease 05/18/2007     Priority:  Medium     Problem list name updated by automated process. Provider to review       Tobacco use disorder 05/18/2007     Priority: Medium     Primary cardiomyopathy (H) 05/18/2007     Priority: Medium     Problem list name updated by automated process. 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 Provider to review        Past Medical History:   Diagnosis Date     Atrial fibrillation (H) 04/14/07    Admit. Discharged 04/15/07     Atrial flutter (H)      Coronary artery disease      Headache(784.0)      Hepatitis, unspecified      Hyperlipidaemia      Hypertension      Measles without mention of complication     Measles     Mumps without mention of complication     Mumps     Orchitis and epididymitis, unspecified      Other and unspecified hyperlipidemia      Other emphysema (H)      Other speech disturbance     Stuttering     Pneumonia, organism unspecified(486)     Pneumonia     Shortness of breath      Urinary obstruction      Varicella without mention of complication     Chickenpox     Past Surgical History:   Procedure Laterality Date     COLONOSCOPY  9/13/2011    Procedure:COLONOSCOPY; colonoscopy; Surgeon:IVETH WIGGINS; Location: GI     HC COLONOSCOPY W/WO BRUSH/WASH  05/08/06     HC LAPAROSCOPY, SURGICAL; CHOLECYSTECTOMY  1998    Cholecystectomy, Laparoscopic     HC REMOVAL OF TONSILS,<13 Y/O      Tonsils <12y.o.     LAMINECT/DISCECTOMY, LUMBAR  03/26/08    Right L4-L5 microlumbar diskectomy.     Current Outpatient Medications   Medication Sig Dispense Refill     aspirin 81 MG tablet Take 1 tablet by mouth daily.       carvedilol (COREG) 12.5 MG tablet Take 1 tablet (12.5 mg) by mouth 2 times daily (with meals) 180 tablet 3     finasteride (PROSCAR) 5 MG tablet TAKE ONE TABLET BY MOUTH ONCE DAILY 30 tablet 8     furosemide (LASIX) 20 MG tablet Take 1 tablet (20 mg) by mouth daily 90 tablet  3     lisinopril (ZESTRIL) 10 MG tablet Take 1 tablet (10 mg) by mouth daily 90 tablet 1     simvastatin (ZOCOR) 80 MG tablet Take 1 tablet (80 mg) by mouth daily 90 tablet 3     warfarin (JANTOVEN) 5 MG tablet Take 7.5 mg on Monday and 5 mg all other days, [facility] 0     warfarin ANTICOAGULANT (JANTOVEN ANTICOAGULANT) 5 MG tablet TAKE ONE AND ONE-HALF TABLETS (7.5MG) BY MOUTH ON MONDAY AND ONE TABLET (5MG) ALL OTHER DAYS OR AS DIRECTED BY [facility] 95 tablet 0     warfarin ANTICOAGULANT (JANTOVEN ANTICOAGULANT) 5 MG tablet TAKE ONE AND ONE-HALF TABLETS (7.5MG) BY MOUTH ON MONDAYS AND ONE TABLET (5MG)  ALL OTHER DAYS OR AS DIRECTED BY [facility] 95 tablet 0     warfarin ANTICOAGULANT (JANTOVEN ANTICOAGULANT) 5 MG tablet TAKE ONE AND ONE-HALF TABLETS BY MOUTH ON MONDAY AND TAKE ONE TABLET BY MOUTH ALL OTHER DAYS OR AS DIRECTED BY [facility] 95 tablet 0     warfarin ANTICOAGULANT (JANTOVEN ANTICOAGULANT) 5 MG tablet TAKE ONE AND ONE-HALF TABLETS BY MOUTH ON MONDAY AND TAKE ONE TABLET BY MOUTH ALL OTHER DAYS OR AS DIRECTED BY [facility] 95 tablet 0     OTC products: None, except as noted above    Allergies   Allergen Reactions     Codeine GI Disturbance     Niacin Hives     got very red and flushed.  Doesn't want      Latex Allergy: NO    Social History     Tobacco Use     Smoking status: Former Smoker     Packs/day: 0.50     Types: Cigarettes     Smokeless tobacco: Never Used     Tobacco comment: smoked for 57 years- since age 9   Substance Use Topics     Alcohol use: Yes     Alcohol/week: 0.0 standard drinks     Comment: socially     History   Drug Use No       REVIEW OF SYSTEMS:   CONSTITUTIONAL: NEGATIVE for fever, chills, change in weight  INTEGUMENTARY/SKIN: NEGATIVE for worrisome rashes, moles or lesions  EYES: Decreased visual acuity bilaterally due to the presence of cataract.  ENT/MOUTH: NEGATIVE for ear, mouth and throat problems  RESP: NEGATIVE for  "significant cough or SOB  BREAST: NEGATIVE for masses, tenderness or discharge  CV: History of cardiac ischemia with previous revascularization.  No current chest pain.  Does have decreased exercise capability due to limited left-ventricular function.  GI: NEGATIVE for nausea, abdominal pain, heartburn, or change in bowel habits  : NEGATIVE for frequency, dysuria, or hematuria  MUSCULOSKELETAL: Has diffuse pain primarily in the knees and low back.  This is arthritic in nature  NEURO: NEGATIVE for weakness, dizziness or paresthesias  ENDOCRINE: NEGATIVE for temperature intolerance, skin/hair changes  HEME: NEGATIVE for bleeding problems  PSYCHIATRIC: NEGATIVE for changes in mood or affect    EXAM:   /60 (BP Location: Right arm, Patient Position: Sitting, Cuff Size: Adult Large)   Pulse 78   Temp 97.2  F (36.2  C) (Temporal)   Resp 22   Ht 1.835 m (6' 0.25\")   Wt 111.6 kg (246 lb)   SpO2 95%   BMI 33.13 kg/m      GENERAL APPEARANCE: healthy, alert and no distress     EYES: Ocular examination deferred to ophthalmology.     HENT: ear canals and TM's normal and nose and mouth without ulcers or lesions     NECK: no adenopathy, no asymmetry, masses, or scars and thyroid normal to palpation     RESP: lungs clear to auscultation - no rales, rhonchi or wheezes     CV: Heart is irregularly irregular without rubs or murmurs.     ABDOMEN:  soft, nontender, no HSM or masses and bowel sounds normal     MS: Crepitance of both knees is noted.  No radicular findings secondary to back pain are present.     SKIN: no suspicious lesions or rashes     NEURO: Normal strength and tone, sensory exam grossly normal, mentation intact and speech normal     PSYCH: mentation appears normal. and affect normal/bright     LYMPHATICS: No cervical adenopathy    DIAGNOSTICS:   No labs or EKG required for low risk surgery (cataract, skin procedure, breast biopsy, etc)    Recent Labs   Lab Test 09/24/20  0929 08/13/20  0851  05/20/20  1005 "  11/19/19 0919 12/12/18 0931  02/06/14  0900  05/10/13  0912   HGB  --   --   --  16.2  --  15.8  --  16.1   < > 16.9  --   --    PLT  --   --   --   --   --  179  --  174   < > 167  --   --    INR 2.8* 2.3*   < > 2.55*   < >  --    < >  --    < >  --    < >  --    NA  --   --   --  142  --  141   < > 139   < > 142   < >  --    POTASSIUM  --   --   --  4.6  --  4.6   < > 4.7   < > 4.4   < >  --    CR  --   --   --  1.08  --  0.95   < > 1.09   < > 1.02   < >  --    A1C  --   --   --   --   --   --   --   --   --  5.9  --  6.1*    < > = values in this interval not displayed.        IMPRESSION:   Reason for surgery/procedure: Bilateral cataract requiring staged extraction and IOL implant.  Diagnosis/reason for consult: Perioperative stratification in a pleasant 85-year-old male with:  1. Preop general physical exam    2. Senile cataract, unspecified age-related cataract type, unspecified laterality    3. Chronic atrial fibrillation (H)    4. Long term current use of anticoagulant therapy    5. Coronary artery disease involving native coronary artery of native heart without angina pectoris    6. Status post percutaneous transluminal coronary angioplasty    7. Ischemic cardiomyopathy    8. Hypertension goal BP (blood pressure) < 140/90    The proposed surgical procedure is considered LOW risk.    REVISED CARDIAC RISK INDEX  The patient has the following serious cardiovascular risks for perioperative complications such as (MI, PE, VFib and 3  AV Block):  Ischemic cardiomyopathy with decreased ejection fraction.  (Should not interfere with low stress surgery)    INTERPRETATION: 1 risks: Class II (low risk - 0.9% complication rate)    The patient has the following additional risks for perioperative complications:  No identified additional risks      ICD-10-CM    1. Preop general physical exam  Z01.818        RECOMMENDATIONS:         --Patient is to take all scheduled medications on the day of surgery EXCEPT for  modifications listed below.    APPROVAL GIVEN to proceed with proposed procedure, without further diagnostic evaluation       Signed Electronically by: Chad Gee DO    Copy of this evaluation report is provided to requesting physician.    [facility] Preop Guidelines    Revised Cardiac Risk Index

## 2020-09-29 NOTE — PATIENT INSTRUCTIONS

## 2020-09-30 PROBLEM — Z98.61 STATUS POST PERCUTANEOUS TRANSLUMINAL CORONARY ANGIOPLASTY: Status: ACTIVE | Noted: 2020-09-30

## 2020-10-02 ENCOUNTER — TELEPHONE (OUTPATIENT)
Dept: ANTICOAGULATION | Facility: OTHER | Age: 85
End: 2020-10-02

## 2020-10-02 DIAGNOSIS — Z79.01 LONG TERM CURRENT USE OF ANTICOAGULANT THERAPY: ICD-10-CM

## 2020-10-02 DIAGNOSIS — I48.20 CHRONIC ATRIAL FIBRILLATION (H): Primary | ICD-10-CM

## 2020-10-02 NOTE — TELEPHONE ENCOUNTER
Please review and renew this patients INR referral, orders pending. Thank you!      Diego Almanzar RN, BSN     Information could not be obtained

## 2020-10-08 DIAGNOSIS — N40.1 BENIGN PROSTATIC HYPERPLASIA WITH URINARY RETENTION: ICD-10-CM

## 2020-10-08 DIAGNOSIS — R33.8 BENIGN PROSTATIC HYPERPLASIA WITH URINARY RETENTION: ICD-10-CM

## 2020-10-08 RX ORDER — FINASTERIDE 5 MG/1
TABLET, FILM COATED ORAL
Qty: 30 TABLET | Refills: 1 | Status: SHIPPED | OUTPATIENT
Start: 2020-10-08 | End: 2020-12-08

## 2020-10-08 NOTE — TELEPHONE ENCOUNTER
Prescription approved per [facility] Refill Protocol.    SHAISTA DanielleN, RN  [facility]

## 2020-11-05 ENCOUNTER — ANTICOAGULATION THERAPY VISIT (OUTPATIENT)
Dept: ANTICOAGULATION | Facility: OTHER | Age: 85
End: 2020-11-05

## 2020-11-05 DIAGNOSIS — Z79.01 LONG TERM CURRENT USE OF ANTICOAGULANT THERAPY: ICD-10-CM

## 2020-11-05 DIAGNOSIS — I48.20 CHRONIC ATRIAL FIBRILLATION (H): ICD-10-CM

## 2020-11-05 LAB
CAPILLARY BLOOD COLLECTION: NORMAL
INR BLD: 3.5 (ref 0.86–1.14)

## 2020-11-05 PROCEDURE — 99207 PR NO CHARGE NURSE ONLY: CPT | Performed by: INTERNAL MEDICINE

## 2020-11-05 PROCEDURE — 36416 COLLJ CAPILLARY BLOOD SPEC: CPT | Performed by: INTERNAL MEDICINE

## 2020-11-05 PROCEDURE — 85610 PROTHROMBIN TIME: CPT | Performed by: INTERNAL MEDICINE

## 2020-11-05 NOTE — PROGRESS NOTES
ANTICOAGULATION FOLLOW-UP CLINIC VISIT    Patient Name:  Kevin Partida  Date:  11/5/2020  Contact Type:  Telephone    SUBJECTIVE:  Patient Findings     Comments:  unable to identify a reason for supratherapeutic INR.          Clinical Outcomes     Comments:  unable to identify a reason for supratherapeutic INR.             OBJECTIVE    Recent labs: (last 7 days)     11/05/20  0919   INR 3.5*       ASSESSMENT / PLAN  INR assessment SUPRA    Recheck INR In: 4 WEEKS    INR Location Outside lab      Anticoagulation Summary  As of 11/5/2020    INR goal:  2.0-3.0   TTR:  78.5 % (1 y)   INR used for dosing:  3.5 (11/5/2020)   Warfarin maintenance plan:  7.5 mg (5 mg x 1.5) every Mon; 5 mg (5 mg x 1) all other days   Full warfarin instructions:  11/5: 2.5 mg; Otherwise 7.5 mg every Mon; 5 mg all other days   Weekly warfarin total:  37.5 mg   Plan last modified:  Diego Almanzar RN (2/4/2019)   Next INR check:  12/3/2020   Priority:  Maintenance   Target end date:  Indefinite    Indications    Long term current use of anticoagulant therapy [Z79.01]  Chronic atrial fibrillation (H) [I48.20]             Anticoagulation Episode Summary     INR check location:      Preferred lab:      Send INR reminders to:  [facility]    Comments:  5 mg tabs, PM dose, print out, BP      Anticoagulation Care Providers     Provider Role Specialty Phone number    Chad Gee DO [place] Referring Internal Medicine [phone]            See the Encounter Report to view Anticoagulation Flowsheet and Dosing Calendar (Go to Encounters tab in chart review, and find the Anticoagulation Therapy Visit)    Dosage adjustment made based on physician directed care plan.    Diego Almanzar RN

## 2020-12-03 ENCOUNTER — ANTICOAGULATION THERAPY VISIT (OUTPATIENT)
Dept: ANTICOAGULATION | Facility: CLINIC | Age: 85
End: 2020-12-03

## 2020-12-03 DIAGNOSIS — Z79.01 LONG TERM CURRENT USE OF ANTICOAGULANT THERAPY: ICD-10-CM

## 2020-12-03 DIAGNOSIS — I48.20 CHRONIC ATRIAL FIBRILLATION (H): ICD-10-CM

## 2020-12-03 LAB
CAPILLARY BLOOD COLLECTION: NORMAL
INR BLD: 3.7 (ref 0.86–1.14)

## 2020-12-03 PROCEDURE — 99207 PR NO CHARGE NURSE ONLY: CPT | Performed by: INTERNAL MEDICINE

## 2020-12-03 PROCEDURE — 85610 PROTHROMBIN TIME: CPT | Performed by: INTERNAL MEDICINE

## 2020-12-03 PROCEDURE — 36416 COLLJ CAPILLARY BLOOD SPEC: CPT | Performed by: INTERNAL MEDICINE

## 2020-12-03 NOTE — PROGRESS NOTES
ANTICOAGULATION FOLLOW-UP CLINIC VISIT    Patient Name:  Kevin Partida  Date:  12/3/2020  Contact Type:  Telephone/ Left message with dosing instructions    SUBJECTIVE:  Patient Findings     Comments:  I left a detailed voicemail with the orders below. I have also requested a call back if there have been any missed doses, concerns, illness, fever, or if there have been any changes in medications, activity level, or diet          Clinical Outcomes     Comments:  I left a detailed voicemail with the orders below. I have also requested a call back if there have been any missed doses, concerns, illness, fever, or if there have been any changes in medications, activity level, or diet             OBJECTIVE    Recent labs: (last 7 days)     12/03/20  0926   INR 3.7*       ASSESSMENT / PLAN  INR assessment SUPRA    Recheck INR In: 2 WEEKS    INR Location Outside lab      Anticoagulation Summary  As of 12/3/2020    INR goal:  2.0-3.0   TTR:  70.8 % (1 y)   INR used for dosing:  3.7 (12/3/2020)   Warfarin maintenance plan:  5 mg (5 mg x 1) every day   Full warfarin instructions:  12/3: 2.5 mg; Otherwise 5 mg every day   Weekly warfarin total:  35 mg   Plan last modified:  Diego Almanzar RN (12/3/2020)   Next INR check:  12/17/2020   Priority:  Maintenance   Target end date:  Indefinite    Indications    Long term current use of anticoagulant therapy [Z79.01]  Chronic atrial fibrillation (H) [I48.20]             Anticoagulation Episode Summary     INR check location:      Preferred lab:      Send INR reminders to:  [facility]    Comments:  5 mg tabs, PM dose, print out, BP      Anticoagulation Care Providers     Provider Role Specialty Phone number    Chad Gee DO Referring Internal Medicine [phone]            See the Encounter Report to view Anticoagulation Flowsheet and Dosing Calendar (Go to Encounters tab in chart review, and find the Anticoagulation Therapy Visit)    Dosage adjustment  made based on physician directed care plan.    Diego Almanzar RN

## 2020-12-08 DIAGNOSIS — R33.8 BENIGN PROSTATIC HYPERPLASIA WITH URINARY RETENTION: ICD-10-CM

## 2020-12-08 DIAGNOSIS — N40.1 BENIGN PROSTATIC HYPERPLASIA WITH URINARY RETENTION: ICD-10-CM

## 2020-12-08 RX ORDER — FINASTERIDE 5 MG/1
1 TABLET, FILM COATED ORAL DAILY
Qty: 30 TABLET | Refills: 8 | Status: SHIPPED | OUTPATIENT
Start: 2020-12-08 | End: 2021-08-30

## 2020-12-08 NOTE — TELEPHONE ENCOUNTER
Proscar Prescription approved per [facility] Refill Protocol.  ARGENIS Garcia

## 2020-12-08 NOTE — TELEPHONE ENCOUNTER
Spoke with wife would like script sent to [facility] location now.   Violet Quintero MA

## 2020-12-17 ENCOUNTER — ANTICOAGULATION THERAPY VISIT (OUTPATIENT)
Dept: ANTICOAGULATION | Facility: CLINIC | Age: 85
End: 2020-12-17

## 2020-12-17 DIAGNOSIS — I48.20 CHRONIC ATRIAL FIBRILLATION (H): ICD-10-CM

## 2020-12-17 DIAGNOSIS — Z79.01 LONG TERM CURRENT USE OF ANTICOAGULANT THERAPY: ICD-10-CM

## 2020-12-17 LAB
CAPILLARY BLOOD COLLECTION: NORMAL
INR BLD: 3.6 (ref 0.86–1.14)

## 2020-12-17 PROCEDURE — 99207 PR NO CHARGE NURSE ONLY: CPT | Performed by: INTERNAL MEDICINE

## 2020-12-17 PROCEDURE — 85610 PROTHROMBIN TIME: CPT | Performed by: INTERNAL MEDICINE

## 2020-12-17 PROCEDURE — 36416 COLLJ CAPILLARY BLOOD SPEC: CPT | Performed by: INTERNAL MEDICINE

## 2020-12-17 NOTE — PROGRESS NOTES
ANTICOAGULATION FOLLOW-UP CLINIC VISIT    Patient Name:  Kevin Partida  Date:  12/17/2020  Contact Type:  Telephone    SUBJECTIVE:  Patient Findings     Comments:  unable to identify a reason for supratherapeutic INR.          Clinical Outcomes     Comments:  unable to identify a reason for supratherapeutic INR.             OBJECTIVE    Recent labs: (last 7 days)     12/17/20  1009   INR 3.6*       ASSESSMENT / PLAN  INR assessment SUPRA    Recheck INR In: 2 WEEKS    INR Location Outside lab      Anticoagulation Summary  As of 12/17/2020    INR goal:  2.0-3.0   TTR:  67.0 % (1 y)   INR used for dosing:  3.6 (12/17/2020)   Warfarin maintenance plan:  2.5 mg (5 mg x 0.5) every Mon, Thu; 5 mg (5 mg x 1) all other days   Full warfarin instructions:  2.5 mg every Mon, Thu; 5 mg all other days   Weekly warfarin total:  30 mg   Plan last modified:  Diego Almanzar RN (12/17/2020)   Next INR check:  12/31/2020   Priority:  Maintenance   Target end date:  Indefinite    Indications    Long term current use of anticoagulant therapy [Z79.01]  Chronic atrial fibrillation (H) [I48.20]             Anticoagulation Episode Summary     INR check location:      Preferred lab:      Send INR reminders to:  [facility]    Comments:  5 mg tabs, PM dose, print out, BP      Anticoagulation Care Providers     Provider Role Specialty Phone number    Chad Gee DO Referring Internal Medicine [phone]            See the Encounter Report to view Anticoagulation Flowsheet and Dosing Calendar (Go to Encounters tab in chart review, and find the Anticoagulation Therapy Visit)    Dosage adjustment made based on physician directed care plan.    Diego Almanzar RN

## 2020-12-28 DIAGNOSIS — I48.20 CHRONIC ATRIAL FIBRILLATION (H): ICD-10-CM

## 2020-12-28 NOTE — TELEPHONE ENCOUNTER
warfarin    Last Written Prescription Date:  09/28/20  Last Fill Quantity: 95,  # refills: 0   Last office visit: 9/29/2020 with prescribing provider:  09/29/20 Marlen   Future Office Visit:

## 2020-12-29 RX ORDER — WARFARIN SODIUM 5 MG/1
TABLET ORAL
Qty: 90 TABLET | Refills: 0 | Status: SHIPPED | OUTPATIENT
Start: 2020-12-29 | End: 2020-12-31

## 2020-12-31 DIAGNOSIS — I48.20 CHRONIC ATRIAL FIBRILLATION (H): ICD-10-CM

## 2020-12-31 RX ORDER — WARFARIN SODIUM 5 MG/1
TABLET ORAL
Qty: 80 TABLET | Refills: 0 | Status: SHIPPED | OUTPATIENT
Start: 2020-12-31 | End: 2021-03-24

## 2021-01-01 ENCOUNTER — LAB (OUTPATIENT)
Dept: LAB | Facility: CLINIC | Age: 86
End: 2021-01-01
Payer: COMMERCIAL

## 2021-01-01 ENCOUNTER — ANTICOAGULATION THERAPY VISIT (OUTPATIENT)
Dept: ANTICOAGULATION | Facility: CLINIC | Age: 86
End: 2021-01-01

## 2021-01-01 DIAGNOSIS — I48.20 CHRONIC ATRIAL FIBRILLATION (H): ICD-10-CM

## 2021-01-01 DIAGNOSIS — Z79.01 LONG TERM CURRENT USE OF ANTICOAGULANT THERAPY: Primary | ICD-10-CM

## 2021-01-01 DIAGNOSIS — Z79.01 LONG TERM CURRENT USE OF ANTICOAGULANT THERAPY: ICD-10-CM

## 2021-01-01 LAB — INR BLD: 2.1 (ref 0.9–1.1)

## 2021-01-01 PROCEDURE — 85610 PROTHROMBIN TIME: CPT

## 2021-01-01 PROCEDURE — 36416 COLLJ CAPILLARY BLOOD SPEC: CPT

## 2021-01-01 RX ORDER — WARFARIN SODIUM 5 MG/1
TABLET ORAL
Qty: 90 TABLET | Refills: 1 | Status: CANCELLED | OUTPATIENT
Start: 2021-01-01

## 2021-01-01 RX ORDER — WARFARIN SODIUM 5 MG/1
TABLET ORAL
Qty: 90 TABLET | Refills: 1 | Status: SHIPPED | OUTPATIENT
Start: 2021-01-01 | End: 2022-01-01

## 2021-01-04 DIAGNOSIS — I25.10 CORONARY ARTERY DISEASE INVOLVING NATIVE CORONARY ARTERY OF NATIVE HEART WITHOUT ANGINA PECTORIS: ICD-10-CM

## 2021-01-04 RX ORDER — LISINOPRIL 10 MG/1
10 TABLET ORAL DAILY
Qty: 90 TABLET | Refills: 0 | Status: SHIPPED | OUTPATIENT
Start: 2021-01-04 | End: 2021-01-28

## 2021-01-05 ENCOUNTER — ANTICOAGULATION THERAPY VISIT (OUTPATIENT)
Dept: ANTICOAGULATION | Facility: CLINIC | Age: 86
End: 2021-01-05

## 2021-01-05 DIAGNOSIS — Z79.01 LONG TERM CURRENT USE OF ANTICOAGULANT THERAPY: ICD-10-CM

## 2021-01-05 DIAGNOSIS — I48.20 CHRONIC ATRIAL FIBRILLATION (H): ICD-10-CM

## 2021-01-05 LAB
CAPILLARY BLOOD COLLECTION: NORMAL
INR BLD: 2 (ref 0.86–1.14)

## 2021-01-05 PROCEDURE — 85610 PROTHROMBIN TIME: CPT | Performed by: INTERNAL MEDICINE

## 2021-01-05 PROCEDURE — 36416 COLLJ CAPILLARY BLOOD SPEC: CPT | Performed by: INTERNAL MEDICINE

## 2021-01-05 NOTE — PROGRESS NOTES
ANTICOAGULATION MANAGEMENT     Patient Name:  Kevin Partida  Date:  2021    ASSESSMENT /SUBJECTIVE:    Today's INR result of 2.0 is therapeutic. Goal INR of 2.0-3.0      Warfarin dose taken: Warfarin taken as instructed    Diet: No new diet changes affecting INR    Medication changes/ interactions: No new medications/supplements affecting INR    Previous INR: Supratherapeutic     S/S of bleeding or thromboembolism: No    New injury or illness: No    Upcoming surgery, procedure or cardioversion: No    Additional findings: None      PLAN:    Telephone call with Kevin regarding INR result and instructed:     Warfarin Dosing Instructions: Continue your current warfarin dose 2.5 mg Mon, TH, and 5 mg all other days    Instructed patient to follow up no later than: 3 weeks  Lab visit scheduled    Education provided: None required      Pt verbalizes understanding and agrees to warfarin dosing plan.    Instructed to call the Anticoagulation Clinic for any changes, questions or concerns. (#[phone])        Nuha Murray RN      OBJECTIVE:  Recent labs: (last 7 days)     21  0936   INR 2.0*         INR assessment THER    Recheck INR In: 3 WEEKS    INR Location Outside lab      Anticoagulation Summary  As of 2021    INR goal:  2.0-3.0   TTR:  65.1 % (1 y)   INR used for dosin.0 (2021)   Warfarin maintenance plan:  2.5 mg (5 mg x 0.5) every Mon, Thu; 5 mg (5 mg x 1) all other days   Full warfarin instructions:  2.5 mg every Mon, Thu; 5 mg all other days   Weekly warfarin total:  30 mg   No change documented:  Nuha Murray RN   Plan last modified:  Diego Almanzar RN (2020)   Next INR check:  2021   Priority:  Maintenance   Target end date:  Indefinite    Indications    Long term current use of anticoagulant therapy [Z79.01]  Chronic atrial fibrillation (H) [I48.20]             Anticoagulation Episode Summary     INR check location:      Preferred lab:      Send INR reminders to:   [facility]    Comments:  5 mg tabs, PM dose, print out, BP      Anticoagulation Care Providers     Provider Role Specialty Phone number    Chad Gee DO Referring Internal Medicine [phone]

## 2021-01-09 ENCOUNTER — HEALTH MAINTENANCE LETTER (OUTPATIENT)
Age: 86
End: 2021-01-09

## 2021-01-26 ENCOUNTER — ANTICOAGULATION THERAPY VISIT (OUTPATIENT)
Dept: ANTICOAGULATION | Facility: CLINIC | Age: 86
End: 2021-01-26

## 2021-01-26 ENCOUNTER — HOSPITAL ENCOUNTER (OUTPATIENT)
Dept: CARDIOLOGY | Facility: CLINIC | Age: 86
Discharge: HOME | End: 2021-01-26
Attending: INTERNAL MEDICINE | Admitting: INTERNAL MEDICINE
Payer: MEDICARE

## 2021-01-26 DIAGNOSIS — I48.20 CHRONIC ATRIAL FIBRILLATION (H): ICD-10-CM

## 2021-01-26 DIAGNOSIS — R06.09 DYSPNEA ON EXERTION: ICD-10-CM

## 2021-01-26 DIAGNOSIS — Z79.01 LONG TERM CURRENT USE OF ANTICOAGULANT THERAPY: ICD-10-CM

## 2021-01-26 DIAGNOSIS — E78.5 HYPERLIPIDEMIA LDL GOAL <70: ICD-10-CM

## 2021-01-26 DIAGNOSIS — I25.10 CORONARY ARTERY DISEASE INVOLVING NATIVE CORONARY ARTERY OF NATIVE HEART WITHOUT ANGINA PECTORIS: ICD-10-CM

## 2021-01-26 DIAGNOSIS — I25.10 CORONARY ARTERY DISEASE INVOLVING NATIVE CORONARY ARTERY OF NATIVE HEART WITHOUT ANGINA PECTORIS: Primary | ICD-10-CM

## 2021-01-26 DIAGNOSIS — I25.5 ISCHEMIC CARDIOMYOPATHY: ICD-10-CM

## 2021-01-26 DIAGNOSIS — I10 HYPERTENSION GOAL BP (BLOOD PRESSURE) < 140/90: ICD-10-CM

## 2021-01-26 DIAGNOSIS — I50.22 CHRONIC SYSTOLIC HEART FAILURE (H): ICD-10-CM

## 2021-01-26 LAB
ALT SERPL W P-5'-P-CCNC: 22 U/L (ref 0–70)
ANION GAP SERPL CALCULATED.3IONS-SCNC: <1 MMOL/L (ref 3–14)
BUN SERPL-MCNC: 22 MG/DL (ref 7–30)
CALCIUM SERPL-MCNC: 8.9 MG/DL (ref 8.5–10.1)
CAPILLARY BLOOD COLLECTION: NORMAL
CHLORIDE SERPL-SCNC: 109 MMOL/L (ref 94–109)
CHOLEST SERPL-MCNC: 151 MG/DL
CO2 SERPL-SCNC: 34 MMOL/L (ref 20–32)
CREAT SERPL-MCNC: 1.06 MG/DL (ref 0.66–1.25)
GFR SERPL CREATININE-BSD FRML MDRD: 63 ML/MIN/{1.73_M2}
GLUCOSE SERPL-MCNC: 128 MG/DL (ref 70–99)
HDLC SERPL-MCNC: 34 MG/DL
HGB BLD-MCNC: 16.5 G/DL (ref 13.3–17.7)
INR BLD: 1.7 (ref 0.86–1.14)
LDLC SERPL CALC-MCNC: 67 MG/DL
NONHDLC SERPL-MCNC: 117 MG/DL
NT-PROBNP SERPL-MCNC: 1136 PG/ML (ref 0–450)
POTASSIUM SERPL-SCNC: 4.9 MMOL/L (ref 3.4–5.3)
SODIUM SERPL-SCNC: 143 MMOL/L (ref 133–144)
TRIGL SERPL-MCNC: 248 MG/DL

## 2021-01-26 PROCEDURE — 80061 LIPID PANEL: CPT | Performed by: INTERNAL MEDICINE

## 2021-01-26 PROCEDURE — 83880 ASSAY OF NATRIURETIC PEPTIDE: CPT | Performed by: INTERNAL MEDICINE

## 2021-01-26 PROCEDURE — 85018 HEMOGLOBIN: CPT | Performed by: INTERNAL MEDICINE

## 2021-01-26 PROCEDURE — 85610 PROTHROMBIN TIME: CPT | Performed by: INTERNAL MEDICINE

## 2021-01-26 PROCEDURE — 999N000208 ECHOCARDIOGRAM COMPLETE

## 2021-01-26 PROCEDURE — 80048 BASIC METABOLIC PNL TOTAL CA: CPT | Performed by: INTERNAL MEDICINE

## 2021-01-26 PROCEDURE — 84460 ALANINE AMINO (ALT) (SGPT): CPT | Performed by: INTERNAL MEDICINE

## 2021-01-26 PROCEDURE — 93306 TTE W/DOPPLER COMPLETE: CPT | Mod: 26 | Performed by: INTERNAL MEDICINE

## 2021-01-26 PROCEDURE — 36416 COLLJ CAPILLARY BLOOD SPEC: CPT | Performed by: INTERNAL MEDICINE

## 2021-01-26 PROCEDURE — 255N000002 HC RX 255 OP 636: Performed by: INTERNAL MEDICINE

## 2021-01-26 RX ADMIN — HUMAN ALBUMIN MICROSPHERES AND PERFLUTREN 5 ML: 10; .22 INJECTION, SOLUTION INTRAVENOUS at 13:33

## 2021-01-26 NOTE — PROGRESS NOTES
ANTICOAGULATION MANAGEMENT     Patient Name:  Kevin Partida  Date:  2021    ASSESSMENT /SUBJECTIVE:    Today's INR result of 1.7 is subtherapeutic. Goal INR of 2.0-3.0      Warfarin dose taken: Warfarin taken as instructed    Diet: No new diet changes affecting INR    Medication changes/ interactions: No new medications/supplements affecting INR    Previous INR: Therapeutic     S/S of bleeding or thromboembolism: No    New injury or illness: No    Upcoming surgery, procedure or cardioversion: No    Additional findings: None      PLAN:    Telephone call with  wife, Irlanda  regarding INR result and instructed:     Warfarin Dosing Instructions: 7.5 mg tonight  then continue your current warfarin dose of 2.5 mg Mon, Thurs and 5 mg ROW    Instructed patient to follow up no later than: 2 weeks  Lab visit scheduled    Education provided: None required      Emogene verbalizes understanding and agrees to warfarin dosing plan.    Instructed to call the Anticoagulation Clinic for any changes, questions or concerns. (#[phone])        Janis Green RN      OBJECTIVE:  Recent labs: (last 7 days)     21  0958   INR 1.7*         INR assessment SUB    Recheck INR In: 2 WEEKS    INR Location Outside lab      Anticoagulation Summary  As of 2021    INR goal:  2.0-3.0   TTR:  59.3 % (1 y)   INR used for dosin.7 (2021)   Warfarin maintenance plan:  2.5 mg (5 mg x 0.5) every Mon, Thu; 5 mg (5 mg x 1) all other days   Full warfarin instructions:  : 7.5 mg; Otherwise 2.5 mg every Mon, Thu; 5 mg all other days   Weekly warfarin total:  30 mg   Plan last modified:  Diego Almanzar, RN (2020)   Next INR check:  2021   Priority:  Maintenance   Target end date:  Indefinite    Indications    Long term current use of anticoagulant therapy [Z79.01]  Chronic atrial fibrillation (H) [I48.20]             Anticoagulation Episode Summary     INR check location:      Preferred lab:      Send INR  reminders to:  DOMINIQUE DURANT    Comments:  5 mg tabs, PM dose, print out, BP      Anticoagulation Care Providers     Provider Role Specialty Phone number    Chda Gee DO Referring Internal Medicine [phone]

## 2021-01-26 NOTE — PROGRESS NOTES
Anticoagulation Management    Unable to reach pt today.    Today's INR result of 1.7 is subtherapeutic (goal INR of 2.0-3.0).  Result received from: Clinic Lab    Follow up required to confirm warfarin dose taken and assess for changes    LM I have attempted to contact this patient by phone, left message to return call at [phone] or [phone].  Will try again later.        Anticoagulation clinic to follow up    Nuha Murray RN

## 2021-01-28 ENCOUNTER — TELEPHONE (OUTPATIENT)
Dept: INTERNAL MEDICINE | Facility: CLINIC | Age: 86
End: 2021-01-28

## 2021-01-28 ENCOUNTER — VIRTUAL VISIT (OUTPATIENT)
Dept: CARDIOLOGY | Facility: CLINIC | Age: 86
End: 2021-01-28
Attending: INTERNAL MEDICINE
Payer: COMMERCIAL

## 2021-01-28 DIAGNOSIS — I10 HYPERTENSION GOAL BP (BLOOD PRESSURE) < 140/90: ICD-10-CM

## 2021-01-28 DIAGNOSIS — I25.10 CORONARY ARTERY DISEASE INVOLVING NATIVE CORONARY ARTERY OF NATIVE HEART WITHOUT ANGINA PECTORIS: ICD-10-CM

## 2021-01-28 DIAGNOSIS — I50.22 CHRONIC SYSTOLIC HEART FAILURE (H): Primary | ICD-10-CM

## 2021-01-28 DIAGNOSIS — E78.5 HYPERLIPIDEMIA LDL GOAL <70: ICD-10-CM

## 2021-01-28 DIAGNOSIS — I48.20 CHRONIC ATRIAL FIBRILLATION (H): ICD-10-CM

## 2021-01-28 PROCEDURE — 99214 OFFICE O/P EST MOD 30 MIN: CPT | Mod: 95 | Performed by: INTERNAL MEDICINE

## 2021-01-28 RX ORDER — SACUBITRIL AND VALSARTAN 24; 26 MG/1; MG/1
1 TABLET, FILM COATED ORAL 2 TIMES DAILY
Qty: 60 TABLET | Refills: 11 | Status: SHIPPED | OUTPATIENT
Start: 2021-01-28 | End: 2021-02-01

## 2021-01-28 NOTE — PROGRESS NOTES
"The patient has been notified of following:     \"This telephone visit will be conducted via a call between you and your physician/provider. We have found that certain health care needs can be provided without the need for a physical exam.  This service lets us provide the care you need with a short phone conversation.  If a prescription is necessary we can send it directly to your pharmacy.  If lab work is needed we can place an order for that and you can then stop by our lab to have the test done at a later time.    Telephone visits are billed at different rates depending on your insurance coverage. During this emergency period, for some insurers they may be billed the same as an in-person visit.  Please reach out to your insurance provider with any questions.    If during the course of the call the physician/provider feels a telephone visit is not appropriate, you will not be charged for this service.\"    Patient has given verbal consent for Telephone visit?  Yes    What phone number would you like to be contacted at? [phone]    How would you like to obtain your AVS? Mail a copy      Blood pressure: not done  Pulse: not done  Weight: 235 lbs last weighed (NOT TODAY), 228 lbs    Phone call duration: 19 minutes    JAI EPPS MD    HPI and Plan:   See dictation    No orders of the defined types were placed in this encounter.    No orders of the defined types were placed in this encounter.    There are no discontinued medications.      Encounter Diagnosis   Name Primary?     Chronic systolic heart failure (H)        CURRENT MEDICATIONS:  Current Outpatient Medications   Medication Sig Dispense Refill     aspirin 81 MG tablet Take 1 tablet by mouth daily.       carvedilol (COREG) 12.5 MG tablet Take 1 tablet (12.5 mg) by mouth 2 times daily (with meals) 180 tablet 3     finasteride (PROSCAR) 5 MG tablet Take 1 tablet (5 mg) by mouth daily 30 tablet 8     furosemide (LASIX) 20 MG tablet Take 1 tablet (20 mg) " by mouth daily 90 tablet 3     lisinopril (ZESTRIL) 10 MG tablet Take 1 tablet (10 mg) by mouth daily 90 tablet 0     simvastatin (ZOCOR) 80 MG tablet Take 1 tablet (80 mg) by mouth daily 90 tablet 3     warfarin ANTICOAGULANT (JANTOVEN ANTICOAGULANT) 5 MG tablet Take 2.5 mg on Mon, Thurs and 5 mg all other days, or as directed by the Coumadin Clinic. 80 tablet 0       ALLERGIES     Allergies   Allergen Reactions     Codeine GI Disturbance     Niacin Hives     got very red and flushed.  Doesn't want       PAST MEDICAL HISTORY:  Past Medical History:   Diagnosis Date     Atrial fibrillation (H) 04/14/07    Admit. Discharged 04/15/07     Atrial flutter (H)      Coronary artery disease      Headache(784.0)      Hepatitis, unspecified      Hyperlipidaemia      Hypertension      Measles without mention of complication     Measles     Mumps without mention of complication     Mumps     Orchitis and epididymitis, unspecified      Other and unspecified hyperlipidemia      Other emphysema (H)      Other speech disturbance     Stuttering     Pneumonia, organism unspecified(486)     Pneumonia     Shortness of breath      Urinary obstruction      Varicella without mention of complication     Chickenpox       PAST SURGICAL HISTORY:  Past Surgical History:   Procedure Laterality Date     COLONOSCOPY  9/13/2011    Procedure:COLONOSCOPY; colonoscopy; Surgeon:IVETH WIGGINS; Location:[facility]     HC COLONOSCOPY W/WO BRUSH/WASH  05/08/06     HC LAPAROSCOPY, SURGICAL; CHOLECYSTECTOMY  1998    Cholecystectomy, Laparoscopic     HC REMOVAL OF TONSILS,<13 Y/O      Tonsils <12y.o.     LAMINECT/DISCECTOMY, LUMBAR  03/26/08    Right L4-L5 microlumbar diskectomy.       FAMILY HISTORY:  Family History   Problem Relation Age of Onset     Alzheimer Disease Mother         ?dimentia or alzheimers     Diabetes Mother         insulin     EYE* Mother         cataract     Cerebrovascular Disease Mother      Cancer Father         lymph tumor of glands      Diabetes Maternal Aunt         ? oral or insulin     Diabetes Other         4 cousins insulin dependent     Genetic Disorder Maternal Grandmother         tremors     Heart Disease Paternal Uncle         ? at age 60-70     Neurologic Disorder Paternal Uncle         parkinsons     Cerebrovascular Disease Paternal Uncle         ? 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     Employer: [email or web address]   Social Needs     Financial resource strain: Not on file     Food insecurity     Worry: Not on file     Inability: Not on file     Transportation needs     Medical: Not on file     Non-medical: Not on file   Tobacco Use     Smoking status: Former Smoker     Packs/day: 0.50     Types: Cigarettes     Smokeless tobacco: Never Used     Tobacco comment: smoked for 57 years- since age 9   Substance and Sexual Activity     Alcohol use: Yes     Alcohol/week: 0.0 standard drinks     Comment: socially     Drug use: No     Sexual activity: Not Currently     Partners: Female   Lifestyle     Physical activity     Days per week: Not on file     Minutes per session: Not on file     Stress: Not on file   Relationships     Social connections     Talks on phone: Not on file     Gets together: Not on file     Attends Confucianist service: Not on file     Active member of club or organization: Not on file     Attends meetings of clubs or organizations: Not on file     Relationship status: Not on file     Intimate partner violence     Fear of current or ex partner: Not on file     Emotionally abused: Not on file     Physically abused: Not on file     Forced sexual activity: Not on file   Other Topics Concern      Service Yes     Comment: [employer] x 8 yrs     Blood Transfusions No      Caffeine Concern No     Comment: 6 cups coffee/day     Occupational Exposure No     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 Yes   Social History Narrative     Not on file       Physical Exam:  Vitals: There were no vitals taken for this visit.    Constitutional:           Skin:             Head:           Eyes:           Lymph:      ENT:           Neck:           Respiratory:            Cardiac:                                                           GI:           Extremities and Muscular Skeletal:                 Neurological:           Psych:         Recent Lab Results:  LIPID RESULTS:  Lab Results   Component Value Date    CHOL 151 01/26/2021    HDL 34 (L) 01/26/2021    LDL 67 01/26/2021    TRIG 248 (H) 01/26/2021    CHOLHDLRATIO 3.1 04/29/2015       LIVER ENZYME RESULTS:  Lab Results   Component Value Date    AST 18 11/19/2019    ALT 22 01/26/2021       CBC RESULTS:  Lab Results   Component Value Date    WBC 7.7 11/19/2019    RBC 5.01 11/19/2019    HGB 16.5 01/26/2021    HCT 48.5 11/19/2019    MCV 97 11/19/2019    MCH 31.5 11/19/2019    MCHC 32.6 11/19/2019    RDW 14.2 11/19/2019     11/19/2019       BMP RESULTS:  Lab Results   Component Value Date     01/26/2021    POTASSIUM 4.9 01/26/2021    CHLORIDE 109 01/26/2021    CO2 34 (H) 01/26/2021    ANIONGAP <1 (L) 01/26/2021     (H) 01/26/2021    BUN 22 01/26/2021    CR 1.06 01/26/2021    GFRESTIMATED 63 01/26/2021    GFRESTBLACK 73 01/26/2021    JO-ANN 8.9 01/26/2021        A1C RESULTS:  Lab Results   Component Value Date    A1C 5.9 02/06/2014        INR RESULTS:  Lab Results   Component Value Date    INR 1.7 (H) 01/26/2021    INR 2.0 (H) 01/05/2021    INR 2.55 (H) 05/20/2020    INR 2.7 (A) 02/24/2020    INR 2.7 (A) 01/13/2020    INR 2.67 (H) 12/20/2018           CC  Jaquan Platt MD  [id number]  [place] [id number]

## 2021-01-28 NOTE — TELEPHONE ENCOUNTER
Prior Authorization Retail Medication Request    Medication/Dose: entresto  ICD code (if different than what is on RX):     Previously Tried and Failed:     Rationale:       Insurance Name:  hum[employer] part d  Insurance ID:  [id number]      Pharmacy Information (if different than what is on RX)  Name:     Phone:

## 2021-01-28 NOTE — LETTER
"1/28/2021    Chad Gee, DO  [address] [place] [address]    RE: Kevin Partida       Dear Colleague,    I had the pleasure of seeing Brown JD Partida in the [facility].    The patient has been notified of following:     \"This telephone visit will be conducted via a call between you and your physician/provider. We have found that certain health care needs can be provided without the need for a physical exam.  This service lets us provide the care you need with a short phone conversation.  If a prescription is necessary we can send it directly to your pharmacy.  If lab work is needed we can place an order for that and you can then stop by our lab to have the test done at a later time.    Telephone visits are billed at different rates depending on your insurance coverage. During this emergency period, for some insurers they may be billed the same as an in-person visit.  Please reach out to your insurance provider with any questions.    If during the course of the call the physician/provider feels a telephone visit is not appropriate, you will not be charged for this service.\"    Patient has given verbal consent for Telephone visit?  Yes    What phone number would you like to be contacted at? [phone]    How would you like to obtain your AVS? Mail a copy      Blood pressure: not done  Pulse: not done  Weight: 235 lbs last weighed (NOT TODAY), 228 lbs    Phone call duration: 19 minutes    JAI EPPS MD    HPI and Plan:   See dictation    No orders of the defined types were placed in this encounter.    No orders of the defined types were placed in this encounter.    There are no discontinued medications.      Encounter Diagnosis   Name Primary?     Chronic systolic heart failure (H)        CURRENT MEDICATIONS:  Current Outpatient Medications   Medication Sig Dispense Refill     aspirin 81 MG tablet Take 1 tablet by mouth daily.       carvedilol (COREG) " 12.5 MG tablet Take 1 tablet (12.5 mg) by mouth 2 times daily (with meals) 180 tablet 3     finasteride (PROSCAR) 5 MG tablet Take 1 tablet (5 mg) by mouth daily 30 tablet 8     furosemide (LASIX) 20 MG tablet Take 1 tablet (20 mg) by mouth daily 90 tablet 3     lisinopril (ZESTRIL) 10 MG tablet Take 1 tablet (10 mg) by mouth daily 90 tablet 0     simvastatin (ZOCOR) 80 MG tablet Take 1 tablet (80 mg) by mouth daily 90 tablet 3     warfarin ANTICOAGULANT (JANTOVEN ANTICOAGULANT) 5 MG tablet Take 2.5 mg on Mon, Thurs and 5 mg all other days, or as directed by the Coumadin Clinic. 80 tablet 0       ALLERGIES     Allergies   Allergen Reactions     Codeine GI Disturbance     Niacin Hives     got very red and flushed.  Doesn't want       PAST MEDICAL HISTORY:  Past Medical History:   Diagnosis Date     Atrial fibrillation (H) 04/14/07    Admit. Discharged 04/15/07     Atrial flutter (H)      Coronary artery disease      Headache(784.0)      Hepatitis, unspecified      Hyperlipidaemia      Hypertension      Measles without mention of complication     Measles     Mumps without mention of complication     Mumps     Orchitis and epididymitis, unspecified      Other and unspecified hyperlipidemia      Other emphysema (H)      Other speech disturbance     Stuttering     Pneumonia, organism unspecified(486)     Pneumonia     Shortness of breath      Urinary obstruction      Varicella without mention of complication     Chickenpox       PAST SURGICAL HISTORY:  Past Surgical History:   Procedure Laterality Date     COLONOSCOPY  9/13/2011    Procedure:COLONOSCOPY; colonoscopy; Surgeon:IVETH WIGGINS; Location: GI     HC COLONOSCOPY W/WO BRUSH/WASH  05/08/06     HC LAPAROSCOPY, SURGICAL; CHOLECYSTECTOMY  1998    Cholecystectomy, Laparoscopic     HC REMOVAL OF TONSILS,<13 Y/O      Tonsils <12y.o.     LAMINECT/DISCECTOMY, LUMBAR  03/26/08    Right L4-L5 microlumbar diskectomy.       FAMILY HISTORY:  Family History   Problem Relation  Age of Onset     Alzheimer Disease Mother         ?dimentia or alzheimers     Diabetes Mother         insulin     EYE* Mother         cataract     Cerebrovascular Disease Mother      Cancer Father         lymph tumor of glands     Diabetes Maternal Aunt         ? oral or insulin     Diabetes Other         4 cousins insulin dependent     Genetic Disorder Maternal Grandmother         tremors     Heart Disease Paternal Uncle         ? at age 60-70     Neurologic Disorder Paternal Uncle         parkinsons     Cerebrovascular Disease Paternal Uncle         ? 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     Employer: [employer]   Social Needs     Financial resource strain: Not on file     Food insecurity     Worry: Not on file     Inability: Not on file     Transportation needs     Medical: Not on file     Non-medical: Not on file   Tobacco Use     Smoking status: Former Smoker     Packs/day: 0.50     Types: Cigarettes     Smokeless tobacco: Never Used     Tobacco comment: smoked for 57 years- since age 9   Substance and Sexual Activity     Alcohol use: Yes     Alcohol/week: 0.0 standard drinks     Comment: socially     Drug use: No     Sexual activity: Not Currently     Partners: Female   Lifestyle     Physical activity     Days per week: Not on file     Minutes per session: Not on file     Stress: Not on file   Relationships     Social connections     Talks on phone: Not on file     Gets together: Not on file     Attends Advent service: Not on file     Active member of club or organization: Not on file     Attends meetings of clubs or organizations: Not on file     Relationship status: Not on file     Intimate partner violence     Fear of current or ex  partner: Not on file     Emotionally abused: Not on file     Physically abused: Not on file     Forced sexual activity: Not on file   Other Topics Concern      Service Yes     Comment: [employer] x 8 yrs     Blood Transfusions No     Caffeine Concern No     Comment: 6 cups coffee/day     Occupational Exposure No     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 Yes   Social History Narrative     Not on file       Physical Exam:  Vitals: There were no vitals taken for this visit.    Constitutional:           Skin:             Head:           Eyes:           Lymph:      ENT:           Neck:           Respiratory:            Cardiac:                                                           GI:           Extremities and Muscular Skeletal:                 Neurological:           Psych:         Recent Lab Results:  LIPID RESULTS:  Lab Results   Component Value Date    CHOL 151 01/26/2021    HDL 34 (L) 01/26/2021    LDL 67 01/26/2021    TRIG 248 (H) 01/26/2021    CHOLHDLRATIO 3.1 04/29/2015       LIVER ENZYME RESULTS:  Lab Results   Component Value Date    AST 18 11/19/2019    ALT 22 01/26/2021       CBC RESULTS:  Lab Results   Component Value Date    WBC 7.7 11/19/2019    RBC 5.01 11/19/2019    HGB 16.5 01/26/2021    HCT 48.5 11/19/2019    MCV 97 11/19/2019    MCH 31.5 11/19/2019    MCHC 32.6 11/19/2019    RDW 14.2 11/19/2019     11/19/2019       BMP RESULTS:  Lab Results   Component Value Date     01/26/2021    POTASSIUM 4.9 01/26/2021    CHLORIDE 109 01/26/2021    CO2 34 (H) 01/26/2021    ANIONGAP <1 (L)  01/26/2021     (H) 01/26/2021    BUN 22 01/26/2021    CR 1.06 01/26/2021    GFRESTIMATED 63 01/26/2021    GFRESTBLACK 73 01/26/2021    JO-ANN 8.9 01/26/2021        A1C RESULTS:  Lab Results   Component Value Date    A1C 5.9 02/06/2014       INR RESULTS:  Lab Results   Component Value Date    INR 1.7 (H) 01/26/2021    INR 2.0 (H) 01/05/2021    INR 2.55 (H) 05/20/2020    INR 2.7 (A) 02/24/2020    INR 2.7 (A) 01/13/2020    INR 2.67 (H) 12/20/2018       Service Date: 01/28/2021      HISTORY OF PRESENT ILLNESS:  I had the opportunity to see Mr. Moshe Partida today in Cardiology Clinic at [facility] in [place].  He is an 85-year-old gentleman with a history of ischemic cardiomyopathy and chronic systolic heart failure complicated by chronic atrial fibrillation and COPD.  We have treated his atrial fibrillation with a strategy of rate control and anticoagulation and he has remained asymptomatic from an atrial fibrillation standpoint.      He has chronic shortness of breath, but he feels that it is stable.  He continues to be very active.  He likes to be out in the woods every day.  Typically, he cuts down some trees with his chainsaw, picks up the wood and uses a splitter.  He likes to  sticks from the trail and monitor all of the property.  He tells me he has to stop once in a while when he is picking up the pieces of wood if they are too big, but in general, he seems to be able to get around quite well without too much limitation.  He does not have any chest pain, lightheadedness or syncope except when he gets up too quickly and has some mild lightheadedness then.      I reviewed his laboratory studies with him today, which include a basic metabolic panel, cholesterol panel and hemoglobin.  All those laboratory studies are normal.  His cholesterol numbers are excellent.  He also had an NT-proBNP of 1100, which is expected and not suggestive of any volume overload or acute heart failure  issue.      He also had an echocardiogram, which I reviewed with him.  His left ventricular function is severely reduced, but stable.  His ejection fraction is 20%-25% with mild left ventricular enlargement.  He also has right ventricular enlargement and dysfunction with mild mitral regurgitation.  I have asked him many times in the past about implanting a prophylactic defibrillator.  He has consistently refused.      I do not have blood pressure or heart rate today.  He tells me that his weight is down to 228 pounds.  Last year, he was about 246.      IMPRESSIONS:  Mr. Moshe Partida is an 85-year-old gentleman with a history of coronary artery disease and ischemic cardiomyopathy.  His ejection fraction is 20%-25%.  He has mild chronic systolic heart failure, New York Heart Association class II, but continues to be very active around his property.  He has no angina or edema and only has occasional mild lightheadedness when standing up too quickly.  He has chronic atrial fibrillation which is rate controlled and he continues on warfarin for stroke prevention.  He also has COPD, which he believes is the primary cause of his shortness of breath at this point.      I recommended that he start Entresto 24/26 mg p.o. b.i.d. and stop lisinopril.  I told him to stop lisinopril for at least 36 hours prior to starting Entresto.  He will check out the cost of Entresto and if it is too much, he will stay on lisinopril.      Otherwise, I think his medications look good and I will not make any additional changes.      I will plan to have him follow up with me again in 6 months.  I will repeat an ECG and see if his left bundle branch block has gotten wide enough to consider implantation of a biventricular device.      cc:      Chad Gee DO    [address] [address]         JAI EPPS MD, [facility]             D: 01/28/2021   T: 01/28/2021   MT: [id number]      Name:     MAHIN PARTIDA    MRN:      [id number]        Account:      [id number]   :      1935           Service Date: 2021      Document: [id number]        Thank you for allowing me to participate in the care of your patient.    Sincerely,     JAI EPPS MD     [facility]

## 2021-01-28 NOTE — PROGRESS NOTES
Service Date: 01/28/2021      HISTORY OF PRESENT ILLNESS:  I had the opportunity to see Mr. Moshe Partida today in Cardiology Clinic at [facility] in [place].  He is an 85-year-old gentleman with a history of ischemic cardiomyopathy and chronic systolic heart failure complicated by chronic atrial fibrillation and COPD.  We have treated his atrial fibrillation with a strategy of rate control and anticoagulation and he has remained asymptomatic from an atrial fibrillation standpoint.      He has chronic shortness of breath, but he feels that it is stable.  He continues to be very active.  He likes to be out in the woods every day.  Typically, he cuts down some trees with his chainsaw, picks up the wood and uses a splitter.  He likes to  sticks from the trail and monitor all of the property.  He tells me he has to stop once in a while when he is picking up the pieces of wood if they are too big, but in general, he seems to be able to get around quite well without too much limitation.  He does not have any chest pain, lightheadedness or syncope except when he gets up too quickly and has some mild lightheadedness then.      I reviewed his laboratory studies with him today, which include a basic metabolic panel, cholesterol panel and hemoglobin.  All those laboratory studies are normal.  His cholesterol numbers are excellent.  He also had an NT-proBNP of 1100, which is expected and not suggestive of any volume overload or acute heart failure issue.      He also had an echocardiogram, which I reviewed with him.  His left ventricular function is severely reduced, but stable.  His ejection fraction is 20%-25% with mild left ventricular enlargement.  He also has right ventricular enlargement and dysfunction with mild mitral regurgitation.  I have asked him many times in the past about implanting a prophylactic defibrillator.  He has consistently refused.      I do not have blood pressure or heart  rate today.  He tells me that his weight is down to 228 pounds.  Last year, he was about 246.      IMPRESSIONS:  Mr. Moshe Partida is an 85-year-old gentleman with a history of coronary artery disease and ischemic cardiomyopathy.  His ejection fraction is 20%-25%.  He has mild chronic systolic heart failure, New York Heart Association class II, but continues to be very active around his property.  He has no angina or edema and only has occasional mild lightheadedness when standing up too quickly.  He has chronic atrial fibrillation which is rate controlled and he continues on warfarin for stroke prevention.  He also has COPD, which he believes is the primary cause of his shortness of breath at this point.      I recommended that he start Entresto 24/26 mg p.o. b.i.d. and stop lisinopril.  I told him to stop lisinopril for at least 36 hours prior to starting Entresto.  He will check out the cost of Entresto and if it is too much, he will stay on lisinopril.      Otherwise, I think his medications look good and I will not make any additional changes.      I will plan to have him follow up with me again in 6 months.  I will repeat an ECG and see if his left bundle branch block has gotten wide enough to consider implantation of a biventricular device.      cc:      Chad Gee DO    [address] [address]         JAI EPPS MD, [facility]             D: 2021   T: 2021   MT: RODOLFO      Name:     MAHIN PARTIDA   MRN:      [id number]        Account:      [id number]   :      1935           Service Date: 2021      Document: [id number]

## 2021-01-29 NOTE — TELEPHONE ENCOUNTER
Central Prior Authorization Team   Phone: [phone]      PA Initiation    Medication: entresto  Insurance Company: [employer] - Phone [phone] Fax [phone]  Pharmacy Filling the Rx: [address]   Filling Pharmacy Phone: [phone]  Filling Pharmacy Fax:    Start Date: 1/29/2021

## 2021-01-29 NOTE — TELEPHONE ENCOUNTER
Prior Authorization Approval    Authorization Effective Date: 1/29/2021  Authorization Expiration Date: 12/31/2021  Medication: entresto  Approved Dose/Quantity:    Reference #:     Insurance Company: [employer] - Phone [phone] Fax [phone]  Expected CoPay:       CoPay Card Available:      Foundation Assistance Needed:    Which Pharmacy is filling the prescription (Not needed for infusion/clinic administered): [facility] [address]   Pharmacy Notified: Yes  Patient Notified: Yes  **Instructed pharmacy to notify patient when script is ready to /ship.**

## 2021-02-01 ENCOUNTER — CARE COORDINATION (OUTPATIENT)
Dept: CARDIOLOGY | Facility: CLINIC | Age: 86
End: 2021-02-01

## 2021-02-01 DIAGNOSIS — I50.22 CHRONIC SYSTOLIC HEART FAILURE (H): Primary | ICD-10-CM

## 2021-02-01 RX ORDER — LISINOPRIL 10 MG/1
10 TABLET ORAL DAILY
Qty: 90 TABLET | Refills: 3 | Status: SHIPPED | OUTPATIENT
Start: 2021-02-01 | End: 2022-01-01

## 2021-02-01 NOTE — PROGRESS NOTES
Pt's wife left message to discuss entresto prescription. I called her back and she said the entresto would cost 400.00 this month, which is too expensive. She said pt would prefer to just stay on the lisinopril for now. I explained that the cost may go down when he reaches his deductible. She said either way 400.oo is too much. I also offered to help enroll him in the pt assistance program for entresto, and pt's wife declined. I will send an update to . Debbie MOER February 1, 2021, 1:53 PM

## 2021-02-09 ENCOUNTER — ANTICOAGULATION THERAPY VISIT (OUTPATIENT)
Dept: ANTICOAGULATION | Facility: CLINIC | Age: 86
End: 2021-02-09

## 2021-02-09 DIAGNOSIS — Z79.01 LONG TERM CURRENT USE OF ANTICOAGULANT THERAPY: ICD-10-CM

## 2021-02-09 DIAGNOSIS — I48.20 CHRONIC ATRIAL FIBRILLATION (H): ICD-10-CM

## 2021-02-09 LAB
CAPILLARY BLOOD COLLECTION: NORMAL
INR BLD: 2.7 (ref 0.86–1.14)

## 2021-02-09 PROCEDURE — 36416 COLLJ CAPILLARY BLOOD SPEC: CPT | Performed by: INTERNAL MEDICINE

## 2021-02-09 PROCEDURE — 85610 PROTHROMBIN TIME: CPT | Performed by: INTERNAL MEDICINE

## 2021-02-09 NOTE — PROGRESS NOTES
ANTICOAGULATION MANAGEMENT     Patient Name:  Kevin Partida  Date:  2021    ASSESSMENT /SUBJECTIVE:    Today's INR result of 2.7 is therapeutic. Goal INR of 2.0-3.0      Warfarin dose taken: Took more then intended- 7.5 mg Mon and 5 mg all other days    Diet: No new diet changes affecting INR    Medication changes/ interactions: No new medications/supplements affecting INR    Previous INR: Subtherapeutic     S/S of bleeding or thromboembolism: No    New injury or illness: No    Upcoming surgery, procedure or cardioversion: No    Additional findings: None      PLAN:    Telephone call with  Spouse regarding INR result and instructed:     Warfarin Dosing Instructions: Take 5 mg daily    Instructed patient to follow up no later than: 3 weeks  Lab visit scheduled    Education provided: Importance of taking warfarin as instructed        Instructed to call the Anticoagulation Clinic for any changes, questions or concerns. (#[phone])        Nuha Murray RN      OBJECTIVE:  Recent labs: (last 7 days)     21  0953   INR 2.7*         INR assessment THER    Recheck INR In: 3 WEEKS    INR Location Outside lab      Anticoagulation Summary  As of 2021    INR goal:  2.0-3.0   TTR:  58.2 % (1 y)   INR used for dosin.7 (2021)   Warfarin maintenance plan:  5 mg (5 mg x 1) every day   Full warfarin instructions:  5 mg every day   Weekly warfarin total:  35 mg   Plan last modified:  Nuha Murray, RN (2021)   Next INR check:     Priority:  Maintenance   Target end date:  Indefinite    Indications    Long term current use of anticoagulant therapy [Z79.01]  Chronic atrial fibrillation (H) [I48.20]             Anticoagulation Episode Summary     INR check location:      Preferred lab:      Send INR reminders to:  [facility]    Comments:  5 mg tabs, PM dose, print out, BP      Anticoagulation Care Providers     Provider Role Specialty Phone number    Chad Gee DO Referring  Internal Medicine [phone]

## 2021-03-02 ENCOUNTER — ANTICOAGULATION THERAPY VISIT (OUTPATIENT)
Dept: ANTICOAGULATION | Facility: CLINIC | Age: 86
End: 2021-03-02

## 2021-03-02 DIAGNOSIS — I48.20 CHRONIC ATRIAL FIBRILLATION (H): ICD-10-CM

## 2021-03-02 DIAGNOSIS — Z79.01 LONG TERM CURRENT USE OF ANTICOAGULANT THERAPY: ICD-10-CM

## 2021-03-02 LAB
CAPILLARY BLOOD COLLECTION: NORMAL
INR BLD: 2.5 (ref 0.86–1.14)

## 2021-03-02 PROCEDURE — 85610 PROTHROMBIN TIME: CPT | Performed by: INTERNAL MEDICINE

## 2021-03-02 NOTE — PROGRESS NOTES
Anticoagulation Management    Unable to reach pt today.    Today's INR result of 2.5 is therapeutic (goal INR of 2.0-3.0).  Result received from: Clinic Lab    Follow up required to confirm warfarin dose taken and assess for changes    VM left to teofilo ACC back. Will try again later       Anticoagulation clinic to follow up    Janis Green RN

## 2021-03-02 NOTE — PROGRESS NOTES
ANTICOAGULATION MANAGEMENT     Patient Name:  Kevin Partida  Date:  3/2/2021    ASSESSMENT /SUBJECTIVE:    Today's INR result of 2.5 is therapeutic. Goal INR of 2.0-3.0      Warfarin dose taken: Warfarin taken as instructed    Diet: No new diet changes affecting INR    Medication changes/ interactions: No new medications/supplements affecting INR    Previous INR: Therapeutic     S/S of bleeding or thromboembolism: No    New injury or illness: No    Upcoming surgery, procedure or cardioversion: No    Additional findings: None      PLAN:    Telephone call with  Emogene, spouse  regarding INR result and instructed:     Warfarin Dosing Instructions: Continue your current warfarin dose 5 mg daily    Instructed patient to follow up no later than: 4 weeks  Lab visit scheduled    Education provided: None required      Emogene verbalizes understanding and agrees to warfarin dosing plan.    Instructed to call the Anticoagulation Clinic for any changes, questions or concerns. (#[phone])        Janis Green RN      OBJECTIVE:  Recent labs: (last 7 days)     21  1147   INR 2.5*         INR assessment THER    Recheck INR In: 4 WEEKS    INR Location Outside lab      Anticoagulation Summary  As of 3/2/2021    INR goal:  2.0-3.0   TTR:  58.2 % (1 y)   INR used for dosin.5 (3/2/2021)   Warfarin maintenance plan:  5 mg (5 mg x 1) every day   Full warfarin instructions:  5 mg every day   Weekly warfarin total:  35 mg   No change documented:  Janis Green RN   Plan last modified:  Nuha Murray RN (2021)   Next INR check:  3/30/2021   Priority:  Maintenance   Target end date:  Indefinite    Indications    Long term current use of anticoagulant therapy [Z79.01]  Chronic atrial fibrillation (H) [I48.20]             Anticoagulation Episode Summary     INR check location:      Preferred lab:      Send INR reminders to:  [facility]    Comments:  5 mg tabs, PM dose, print out, BP       Anticoagulation Care Providers     Provider Role Specialty Phone number    Chad Gee DO Referring Internal Medicine [phone]

## 2021-03-24 DIAGNOSIS — I48.20 CHRONIC ATRIAL FIBRILLATION (H): ICD-10-CM

## 2021-03-24 RX ORDER — WARFARIN SODIUM 5 MG/1
TABLET ORAL
Qty: 80 TABLET | Refills: 0 | Status: SHIPPED | OUTPATIENT
Start: 2021-03-24 | End: 2021-06-11

## 2021-03-30 ENCOUNTER — ANTICOAGULATION THERAPY VISIT (OUTPATIENT)
Dept: ANTICOAGULATION | Facility: CLINIC | Age: 86
End: 2021-03-30

## 2021-03-30 DIAGNOSIS — Z79.01 LONG TERM CURRENT USE OF ANTICOAGULANT THERAPY: ICD-10-CM

## 2021-03-30 DIAGNOSIS — I48.20 CHRONIC ATRIAL FIBRILLATION (H): ICD-10-CM

## 2021-03-30 LAB
CAPILLARY BLOOD COLLECTION: NORMAL
INR BLD: 3 (ref 0.86–1.14)

## 2021-03-30 PROCEDURE — 85610 PROTHROMBIN TIME: CPT | Performed by: INTERNAL MEDICINE

## 2021-03-30 PROCEDURE — 36416 COLLJ CAPILLARY BLOOD SPEC: CPT | Performed by: INTERNAL MEDICINE

## 2021-03-30 NOTE — PROGRESS NOTES
ANTICOAGULATION MANAGEMENT     Patient Name:  Kevin Partida  Date:  3/30/2021    ASSESSMENT /SUBJECTIVE:    Today's INR result of 3.0 is therapeutic. Goal INR of 2.0-3.0      Warfarin dose taken: LM    Diet: LM    Medication changes/ interactions: LM    Previous INR: Therapeutic     S/S of bleeding or thromboembolism: No    New injury or illness: No    Upcoming surgery, procedure or cardioversion: No    Additional findings: None      PLAN:    Detailed message left for Kevin regarding INR result and instructed:     Warfarin Dosing Instructions: Continue your current warfarin dose 5 mg daily    Instructed patient to follow up no later than: 4 weeks  Left detailed message with recommended recheck date    Education provided: Please call back if any changes to your diet, medications or how you've been taking warfarin        Instructed to call the Anticoagulation Clinic for any changes, questions or concerns. (#[phone])        Nuha Murray RN      OBJECTIVE:  Recent labs: (last 7 days)     03/30/21  0951   INR 3.0*         No question data found.  Anticoagulation Summary  As of 3/30/2021    INR goal:  2.0-3.0   TTR:  58.2 % (1 y)   INR used for dosing:  3.0 (3/30/2021)   Warfarin maintenance plan:  5 mg (5 mg x 1) every day   Full warfarin instructions:  5 mg every day   Weekly warfarin total:  35 mg   No change documented:  Nuha Murray, RN   Plan last modified:  Nhua Murray, RN (2/9/2021)   Next INR check:  4/27/2021   Priority:  Maintenance   Target end date:  Indefinite    Indications    Long term current use of anticoagulant therapy [Z79.01]  Chronic atrial fibrillation (H) [I48.20]             Anticoagulation Episode Summary     INR check location:      Preferred lab:      Send INR reminders to:  [facility]    Comments:  5 mg tabs, PM dose, print out, BP      Anticoagulation Care Providers     Provider Role Specialty Phone number    Chad Gee DO Referring Internal Medicine  [phone]

## 2021-04-19 DIAGNOSIS — E78.5 HYPERLIPIDEMIA LDL GOAL <70: ICD-10-CM

## 2021-04-19 RX ORDER — SIMVASTATIN 80 MG
80 TABLET ORAL DAILY
Qty: 90 TABLET | Refills: 2 | Status: SHIPPED | OUTPATIENT
Start: 2021-04-19 | End: 2022-01-01

## 2021-04-30 ENCOUNTER — ANTICOAGULATION THERAPY VISIT (OUTPATIENT)
Dept: ANTICOAGULATION | Facility: CLINIC | Age: 86
End: 2021-04-30

## 2021-04-30 DIAGNOSIS — Z79.01 LONG TERM CURRENT USE OF ANTICOAGULANT THERAPY: ICD-10-CM

## 2021-04-30 DIAGNOSIS — I48.20 CHRONIC ATRIAL FIBRILLATION (H): ICD-10-CM

## 2021-04-30 LAB
CAPILLARY BLOOD COLLECTION: NORMAL
INR BLD: 2.4 (ref 0.86–1.14)

## 2021-04-30 PROCEDURE — 36416 COLLJ CAPILLARY BLOOD SPEC: CPT | Performed by: INTERNAL MEDICINE

## 2021-04-30 PROCEDURE — 85610 PROTHROMBIN TIME: CPT | Performed by: INTERNAL MEDICINE

## 2021-04-30 NOTE — PROGRESS NOTES
ANTICOAGULATION MANAGEMENT     Patient Name:  Kevin Partida  Date:  2021    ASSESSMENT /SUBJECTIVE:    Today's INR result of 2.4 is therapeutic. Goal INR of 2.0-3.0      Warfarin dose taken: LM    Diet: LM    Medication changes/ interactions: LM    Previous INR: Therapeutic     S/S of bleeding or thromboembolism: No    New injury or illness: No    Upcoming surgery, procedure or cardioversion: No    Additional findings: None      PLAN:    Detailed message left for Kevin regarding INR result and instructed:     Warfarin Dosing Instructions: Continue your current warfarin dose 5 mg daily    Instructed patient to follow up no later than: 6 weeks  Left detailed message with recommended recheck date    Education provided: Please call back if any changes to your diet, medications or how you've been taking warfarin      Instructed to call the Anticoagulation Clinic for any changes, questions or concerns. (#[phone])        Nuha Murray RN      OBJECTIVE:  Recent labs: (last 7 days)     21  0846   INR 2.4*         INR assessment THER    Recheck INR In: 6 WEEKS    INR Location Outside lab      Anticoagulation Summary  As of 2021    INR goal:  2.0-3.0   TTR:  58.1 % (1 y)   INR used for dosin.4 (2021)   Warfarin maintenance plan:  5 mg (5 mg x 1) every day   Full warfarin instructions:  5 mg every day   Weekly warfarin total:  35 mg   No change documented:  Nuha Murray, RN   Plan last modified:  Nuha Murray, RN (2021)   Next INR check:  2021   Priority:  Maintenance   Target end date:  Indefinite    Indications    Long term current use of anticoagulant therapy [Z79.01]  Chronic atrial fibrillation (H) [I48.20]             Anticoagulation Episode Summary     INR check location:      Preferred lab:      Send INR reminders to:  [facility]    Comments:  5 mg tabs, PM dose, print out, BP      Anticoagulation Care Providers     Provider Role Specialty Phone number     Chad Gee DO [facility] Internal Medicine [phone]

## 2021-05-03 DIAGNOSIS — I25.10 CORONARY ARTERY DISEASE INVOLVING NATIVE CORONARY ARTERY WITHOUT ANGINA PECTORIS: ICD-10-CM

## 2021-05-03 RX ORDER — CARVEDILOL 12.5 MG/1
12.5 TABLET ORAL 2 TIMES DAILY WITH MEALS
Qty: 180 TABLET | Refills: 1 | Status: SHIPPED | OUTPATIENT
Start: 2021-05-03 | End: 2021-10-25

## 2021-06-03 ENCOUNTER — ANTICOAGULATION THERAPY VISIT (OUTPATIENT)
Dept: ANTICOAGULATION | Facility: CLINIC | Age: 86
End: 2021-06-03

## 2021-06-03 DIAGNOSIS — I48.20 CHRONIC ATRIAL FIBRILLATION (H): ICD-10-CM

## 2021-06-03 DIAGNOSIS — Z79.01 LONG TERM CURRENT USE OF ANTICOAGULANT THERAPY: ICD-10-CM

## 2021-06-03 LAB
CAPILLARY BLOOD COLLECTION: NORMAL
INR BLD: 2.4 (ref 0.86–1.14)

## 2021-06-03 PROCEDURE — 85610 PROTHROMBIN TIME: CPT | Performed by: INTERNAL MEDICINE

## 2021-06-03 PROCEDURE — 36416 COLLJ CAPILLARY BLOOD SPEC: CPT | Performed by: INTERNAL MEDICINE

## 2021-06-03 NOTE — PROGRESS NOTES
ANTICOAGULATION MANAGEMENT     Patient Name:  Kevin Partida  Date:  6/3/2021    ASSESSMENT /SUBJECTIVE:    Today's INR result of 2.4 is therapeutic. Goal INR of 2.0-3.0      Warfarin dose taken: Warfarin taken as instructed    Diet: No new diet changes affecting INR    Medication changes/ interactions: No new medications/supplements affecting INR    Previous INR: Therapeutic     S/S of bleeding or thromboembolism: No    New injury or illness: No    Upcoming surgery, procedure or cardioversion: No    Additional findings: None      PLAN:    Warfarin Dosing Instructions: Continue your current warfarin dose 5 mg daily    Instructed patient to follow up no later than: 6 weeks  Left detailed message with recommended recheck date    Education provided: Please call back if any changes to your diet, medications or how you've been taking warfarin    Detailed voice message left for Kevin with dosing instructions and follow up date.     Instructed to call the Anticoagulation Clinic for any changes, questions or concerns. (#[phone])        Diego Almanzar RN      OBJECTIVE:  Recent labs: (last 7 days)     21  1312   INR 2.4*         INR assessment THER    Recheck INR In: 6 WEEKS    INR Location Outside lab      Anticoagulation Summary  As of 6/3/2021    INR goal:  2.0-3.0   TTR:  58.2 % (1 y)   INR used for dosin.4 (6/3/2021)   Warfarin maintenance plan:  5 mg (5 mg x 1) every day   Full warfarin instructions:  5 mg every day   Weekly warfarin total:  35 mg   No change documented:  Diego Almanzar RN   Plan last modified:  Nuha Murray RN (2021)   Next INR check:  7/15/2021   Priority:  Maintenance   Target end date:  Indefinite    Indications    Long term current use of anticoagulant therapy [Z79.01]  Chronic atrial fibrillation (H) [I48.20]             Anticoagulation Episode Summary     INR check location:      Preferred lab:      Send INR reminders to:  [facility]    Comments:  5 mg tabs,  PM dose, print out, BP      Anticoagulation Care Providers     Provider Role Specialty Phone number    Chad Gee DO Referring Internal Medicine [phone]

## 2021-06-11 DIAGNOSIS — I48.20 CHRONIC ATRIAL FIBRILLATION (H): ICD-10-CM

## 2021-06-11 RX ORDER — WARFARIN SODIUM 5 MG/1
TABLET ORAL
Qty: 90 TABLET | Refills: 1 | Status: SHIPPED | OUTPATIENT
Start: 2021-06-11 | End: 2021-06-18

## 2021-06-18 DIAGNOSIS — I48.20 CHRONIC ATRIAL FIBRILLATION (H): ICD-10-CM

## 2021-06-18 DIAGNOSIS — I25.5 ISCHEMIC CARDIOMYOPATHY: ICD-10-CM

## 2021-06-18 RX ORDER — WARFARIN SODIUM 5 MG/1
TABLET ORAL
Qty: 90 TABLET | Refills: 1 | Status: SHIPPED | OUTPATIENT
Start: 2021-06-18 | End: 2021-01-01

## 2021-06-18 RX ORDER — FUROSEMIDE 20 MG
20 TABLET ORAL DAILY
Qty: 90 TABLET | Refills: 2 | Status: SHIPPED | OUTPATIENT
Start: 2021-06-18 | End: 2022-01-01

## 2021-06-19 DIAGNOSIS — I48.20 CHRONIC ATRIAL FIBRILLATION (H): ICD-10-CM

## 2021-06-19 RX ORDER — WARFARIN SODIUM 5 MG/1
TABLET ORAL
Qty: 90 TABLET | Refills: 1 | Status: CANCELLED | OUTPATIENT
Start: 2021-06-19

## 2021-06-21 NOTE — TELEPHONE ENCOUNTER
Script was just filled on 6/18/2021 for 90 tablets and 1 refill sent to [facility] pharmacy.   Violet Quintero MA

## 2021-07-01 ENCOUNTER — ANTICOAGULATION THERAPY VISIT (OUTPATIENT)
Dept: ANTICOAGULATION | Facility: CLINIC | Age: 86
End: 2021-07-01

## 2021-07-01 DIAGNOSIS — I48.20 CHRONIC ATRIAL FIBRILLATION (H): ICD-10-CM

## 2021-07-01 DIAGNOSIS — Z79.01 LONG TERM CURRENT USE OF ANTICOAGULANT THERAPY: ICD-10-CM

## 2021-07-01 LAB
CAPILLARY BLOOD COLLECTION: NORMAL
INR BLD: 2.6 (ref 0.86–1.14)

## 2021-07-01 PROCEDURE — 36416 COLLJ CAPILLARY BLOOD SPEC: CPT | Performed by: INTERNAL MEDICINE

## 2021-07-01 PROCEDURE — 85610 PROTHROMBIN TIME: CPT | Performed by: INTERNAL MEDICINE

## 2021-07-01 NOTE — PROGRESS NOTES
ANTICOAGULATION MANAGEMENT     Kevin Partida 86 year old male is on warfarin with therapeutic INR result. (Goal INR 2.0-3.0)    Recent labs: (last 7 days)     07/01/21  0959   INR 2.6*       ASSESSMENT     Source(s): Chart Review and Patient/Caregiver Call       Warfarin doses taken: Warfarin taken as instructed    Diet: No new diet changes identified    New illness, injury, or hospitalization: No    Medication/supplement changes: None noted    Signs or symptoms of bleeding or clotting: No    Previous INR: Therapeutic last 2(+) visits    Additional findings: None     PLAN     Recommended plan for no diet, medication or health factor changes affecting INR     Dosing Instructions: Continue your current warfarin dose with next INR in 6 weeks       Summary  As of 7/1/2021    Full warfarin instructions:  5 mg every day   Next INR check:  8/12/2021             Telephone call with Kevin whom verbalizes understanding and agrees to plan    Lab visit scheduled    Education provided: None required    Plan made per [facility] anticoagulation protocol    Diego Almanzar RN  Anticoagulation Clinic  7/1/2021    _______________________________________________________________________     Anticoagulation Episode Summary     Current INR goal:  2.0-3.0   TTR:  63.5 % (1 y)   Target end date:  Indefinite   Send INR reminders to:  [facility]    Indications    Long term current use of anticoagulant therapy [Z79.01]  Chronic atrial fibrillation (H) [I48.20]           Comments:  5 mg tabs, PM dose, print out, BP         Anticoagulation Care Providers     Provider Role Specialty Phone number    Chad Gee DO [facility] Internal Medicine [phone]

## 2021-07-20 ENCOUNTER — OFFICE VISIT (OUTPATIENT)
Dept: CARDIOLOGY | Facility: CLINIC | Age: 86
End: 2021-07-20
Payer: COMMERCIAL

## 2021-07-20 VITALS
HEIGHT: 72 IN | DIASTOLIC BLOOD PRESSURE: 78 MMHG | WEIGHT: 245.8 LBS | HEART RATE: 64 BPM | BODY MASS INDEX: 33.29 KG/M2 | SYSTOLIC BLOOD PRESSURE: 110 MMHG | OXYGEN SATURATION: 95 %

## 2021-07-20 DIAGNOSIS — E78.5 HYPERLIPIDEMIA LDL GOAL <70: ICD-10-CM

## 2021-07-20 DIAGNOSIS — I48.20 CHRONIC ATRIAL FIBRILLATION (H): Primary | ICD-10-CM

## 2021-07-20 DIAGNOSIS — I10 HYPERTENSION GOAL BP (BLOOD PRESSURE) < 140/90: ICD-10-CM

## 2021-07-20 DIAGNOSIS — I25.5 ISCHEMIC CARDIOMYOPATHY: ICD-10-CM

## 2021-07-20 DIAGNOSIS — R06.09 DYSPNEA ON EXERTION: ICD-10-CM

## 2021-07-20 DIAGNOSIS — I25.10 CORONARY ARTERY DISEASE INVOLVING NATIVE CORONARY ARTERY OF NATIVE HEART WITHOUT ANGINA PECTORIS: ICD-10-CM

## 2021-07-20 DIAGNOSIS — I50.22 CHRONIC SYSTOLIC HEART FAILURE (H): ICD-10-CM

## 2021-07-20 PROCEDURE — 93010 ELECTROCARDIOGRAM REPORT: CPT | Performed by: INTERNAL MEDICINE

## 2021-07-20 PROCEDURE — 99214 OFFICE O/P EST MOD 30 MIN: CPT | Performed by: INTERNAL MEDICINE

## 2021-07-20 ASSESSMENT — MIFFLIN-ST. JEOR: SCORE: 1836.91

## 2021-07-20 NOTE — PROGRESS NOTES
HPI and Plan:   See dictation    Orders Placed This Encounter   Procedures     Basic metabolic panel     Lipid Profile     ALT     Follow-Up with Cardiologist     EKG 12-LEAD CLINIC READ     Echocardiogram Complete       No orders of the defined types were placed in this encounter.      There are no discontinued medications.      Encounter Diagnoses   Name Primary?     Chronic systolic heart failure (H)      Chronic atrial fibrillation (H) Yes     Coronary artery disease involving native coronary artery of native heart without angina pectoris      Hyperlipidemia LDL goal <70      Ischemic cardiomyopathy      Dyspnea on exertion      Hypertension goal BP (blood pressure) < 140/90        CURRENT MEDICATIONS:  Current Outpatient Medications   Medication Sig Dispense Refill     aspirin 81 MG tablet Take 1 tablet by mouth daily.       carvedilol (COREG) 12.5 MG tablet Take 1 tablet (12.5 mg) by mouth 2 times daily (with meals) 180 tablet 1     finasteride (PROSCAR) 5 MG tablet Take 1 tablet (5 mg) by mouth daily 30 tablet 8     furosemide (LASIX) 20 MG tablet Take 1 tablet (20 mg) by mouth daily 90 tablet 2     lisinopril (ZESTRIL) 10 MG tablet Take 1 tablet (10 mg) by mouth daily 90 tablet 3     simvastatin (ZOCOR) 80 MG tablet Take 1 tablet (80 mg) by mouth daily 90 tablet 2     warfarin ANTICOAGULANT (JANTOVEN ANTICOAGULANT) 5 MG tablet Take 5 mg daily, or as directed by the Coumadin Clinic. 90 tablet 1       ALLERGIES     Allergies   Allergen Reactions     Codeine GI Disturbance     Niacin Hives     got very red and flushed.  Doesn't want       PAST MEDICAL HISTORY:  Past Medical History:   Diagnosis Date     Atrial fibrillation (H) 04/14/07    Admit. Discharged 04/15/07     Atrial flutter (H)      Coronary artery disease      Headache(784.0)      Hepatitis, unspecified      Hyperlipidaemia      Hypertension      Measles without mention of complication     Measles     Mumps without mention of complication     Mumps      Orchitis and epididymitis, unspecified      Other and unspecified hyperlipidemia      Other emphysema (H)      Other speech disturbance     Stuttering     Pneumonia, organism unspecified(486)     Pneumonia     Shortness of breath      Urinary obstruction      Varicella without mention of complication     Chickenpox       PAST SURGICAL HISTORY:  Past Surgical History:   Procedure Laterality Date     COLONOSCOPY  2011    Procedure:COLONOSCOPY; colonoscopy; Surgeon:IVETH WIGGINS; Location:[facility]     HC LAPAROSCOPY, SURGICAL; CHOLECYSTECTOMY      Cholecystectomy, Laparoscopic     HC REMOVAL OF TONSILS,<13 Y/O      Tonsils <12y.o.     LAMINECT/DISCECTOMY, LUMBAR  08    Right L4-L5 microlumbar diskectomy.     ZZHC COLONOSCOPY W/WO BRUSH/WASH  06       FAMILY HISTORY:  Family History   Problem Relation Age of Onset     Alzheimer Disease Mother         ?dimentia or alzheimers     Diabetes Mother         insulin     EYE* Mother         cataract     Cerebrovascular Disease Mother      Cancer Father         lymph tumor of glands     Diabetes Maternal Aunt         ? oral or insulin     Diabetes Other         4 cousins insulin dependent     Genetic Disorder Maternal Grandmother         tremors     Heart Disease Paternal Uncle         ? at age 60-70     Neurologic Disorder Paternal Uncle         parkinsons     Cerebrovascular Disease Paternal Uncle         ? 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     Employer: [employer]   Tobacco Use     Smoking status: Former Smoker     Packs/day: 0.50     Types: Cigarettes     Smokeless tobacco: Never Used     Tobacco comment: smoked for 57 years- since age 9   Substance and Sexual Activity     Alcohol use:  Yes     Alcohol/week: 0.0 standard drinks     Comment: socially     Drug use: No     Sexual activity: Not Currently     Partners: Female   Other Topics Concern      Service Yes     Comment: [employer] x 8 yrs     Blood Transfusions No     Caffeine Concern No     Comment: 6 cups coffee/day     Occupational Exposure No     Comment: 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 Yes   Social History Narrative     Not on file     Social Determinants of Health     Financial Resource Strain:      Difficulty of Paying Living Expenses:    Food Insecurity:      Worried About Running Out of Food in the Last Year:      Ran Out of Food in the Last Year:    Transportation Needs:      Lack of Transportation (Medical):      Lack of Transportation (Non-Medical):    Physical Activity:      Days of Exercise per Week:      Minutes of Exercise per Session:    Stress:      Feeling of Stress :    Social Connections:      Frequency of Communication with Friends and Family:      Frequency of Social Gatherings with Friends and Family:      Attends Yazidi Services:      Active Member of Clubs or Organizations:      Attends Club or Organization Meetings:      Marital Status:    Intimate Partner Violence:      Fear of Current or Ex-Partner:      Emotionally Abused:      Physically Abused:      Sexually Abused:        Review of Systems:  Skin:  Negative       Eyes:  Positive for glasses    ENT:  Negative      Respiratory:  Positive for dyspnea on exertion     Cardiovascular:  Negative for;chest pain;palpitations Positive for;dizziness;edema   "  Gastroenterology: Negative      Genitourinary:  Negative      Musculoskeletal:  Positive for joint pain;back pain    Neurologic:  Negative      Psychiatric:  Negative      Heme/Lymph/Imm:  Positive for allergies    Endocrine:  Negative        Physical Exam:  Vitals: /78 (BP Location: Left arm, Patient Position: Sitting, Cuff Size: Adult Large)   Pulse 64   Ht 1.835 m (6' 0.25\")   Wt 111.5 kg (245 lb 12.8 oz)   SpO2 95%   BMI 33.11 kg/m      Constitutional:  cooperative, alert and oriented, well developed, well nourished, in no acute distress        Skin:  warm and dry to the touch      (cyanotic nose)    Head:  normocephalic        Eyes:           Lymph:      ENT:  no pallor or cyanosis, dentition good        Neck:  no stiffness JVP 8-10      Respiratory:  clear to auscultation prolonged expiration;expiratory wheezes;diminished breath sounds bilaterally       Cardiac:   irregularly irregular rhythm   no presence of murmur          pulses full and equal                                        GI:  non-tender;abdomen soft;BS normoactive obese      Extremities and Muscular Skeletal:  no deformities, clubbing, cyanosis, erythema observed;no edema   bilateral LE edema;trace trace;RLE edema trace;LLE edema      Neurological:  no gross motor deficits;affect appropriate        Psych:  Alert and Oriented x 3        CC  No referring provider defined for this encounter.              "

## 2021-07-20 NOTE — LETTER
7/20/2021    Chad Gee, DO  [address] [place] [address]    RE: Kevin Partida       Dear Colleague,    I had the pleasure of seeing Kevin Partida in the [facility].    HPI and Plan:   See dictation    Orders Placed This Encounter   Procedures     Basic metabolic panel     Lipid Profile     ALT     Follow-Up with Cardiologist     EKG 12-LEAD CLINIC READ     Echocardiogram Complete       No orders of the defined types were placed in this encounter.      There are no discontinued medications.      Encounter Diagnoses   Name Primary?     Chronic systolic heart failure (H)      Chronic atrial fibrillation (H) Yes     Coronary artery disease involving native coronary artery of native heart without angina pectoris      Hyperlipidemia LDL goal <70      Ischemic cardiomyopathy      Dyspnea on exertion      Hypertension goal BP (blood pressure) < 140/90        CURRENT MEDICATIONS:  Current Outpatient Medications   Medication Sig Dispense Refill     aspirin 81 MG tablet Take 1 tablet by mouth daily.       carvedilol (COREG) 12.5 MG tablet Take 1 tablet (12.5 mg) by mouth 2 times daily (with meals) 180 tablet 1     finasteride (PROSCAR) 5 MG tablet Take 1 tablet (5 mg) by mouth daily 30 tablet 8     furosemide (LASIX) 20 MG tablet Take 1 tablet (20 mg) by mouth daily 90 tablet 2     lisinopril (ZESTRIL) 10 MG tablet Take 1 tablet (10 mg) by mouth daily 90 tablet 3     simvastatin (ZOCOR) 80 MG tablet Take 1 tablet (80 mg) by mouth daily 90 tablet 2     warfarin ANTICOAGULANT (JANTOVEN ANTICOAGULANT) 5 MG tablet Take 5 mg daily, or as directed by the Coumadin Clinic. 90 tablet 1       ALLERGIES     Allergies   Allergen Reactions     Codeine GI Disturbance     Niacin Hives     got very red and flushed.  Doesn't want       PAST MEDICAL HISTORY:  Past Medical History:   Diagnosis Date     Atrial fibrillation (H) 04/14/07    Admit. Discharged  04/15/07     Atrial flutter (H)      Coronary artery disease      Headache(784.0)      Hepatitis, unspecified      Hyperlipidaemia      Hypertension      Measles without mention of complication     Measles     Mumps without mention of complication     Mumps     Orchitis and epididymitis, unspecified      Other and unspecified hyperlipidemia      Other emphysema (H)      Other speech disturbance     Stuttering     Pneumonia, organism unspecified(486)     Pneumonia     Shortness of breath      Urinary obstruction      Varicella without mention of complication     Chickenpox       PAST SURGICAL HISTORY:  Past Surgical History:   Procedure Laterality Date     COLONOSCOPY  2011    Procedure:COLONOSCOPY; colonoscopy; Surgeon:IVETH WIGGINS; Location:[facility]     HC LAPAROSCOPY, SURGICAL; CHOLECYSTECTOMY      Cholecystectomy, Laparoscopic     HC REMOVAL OF TONSILS,<11 Y/O      Tonsils <12y.o.     LAMINECT/DISCECTOMY, LUMBAR  08    Right L4-L5 microlumbar diskectomy.     ZZHC COLONOSCOPY W/WO BRUSH/WASH  06       FAMILY HISTORY:  Family History   Problem Relation Age of Onset     Alzheimer Disease Mother         ?dimentia or alzheimers     Diabetes Mother         insulin     EYE* Mother         cataract     Cerebrovascular Disease Mother      Cancer Father         lymph tumor of glands     Diabetes Maternal Aunt         ? oral or insulin     Diabetes Other         4 cousins insulin dependent     Genetic Disorder Maternal Grandmother         tremors     Heart Disease Paternal Uncle         ? at age 60-70     Neurologic Disorder Paternal Uncle         parkinsons     Cerebrovascular Disease Paternal Uncle         ? 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      Employer: [employer]   Tobacco Use     Smoking status: Former Smoker     Packs/day: 0.50     Types: Cigarettes     Smokeless tobacco: Never Used     Tobacco comment: smoked for 57 years- since age 9   Substance and Sexual Activity     Alcohol use: Yes     Alcohol/week: 0.0 standard drinks     Comment: socially     Drug use: No     Sexual activity: Not Currently     Partners: Female   Other Topics Concern      Service Yes     Comment: [employer] x 8 yrs     Blood Transfusions No     Caffeine Concern No     Comment: 6 cups coffee/day     Occupational Exposure No     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 Yes   Social History Narrative     Not on file     Social Determinants of Health     Financial Resource Strain:      Difficulty of Paying Living Expenses:    Food Insecurity:      Worried About Running Out of Food in the Last Year:      Ran Out of Food in the Last Year:    Transportation Needs:      Lack of Transportation (Medical):      Lack of Transportation (Non-Medical):    Physical Activity:      Days of Exercise per Week:      Minutes of Exercise per Session:    Stress:      Feeling of Stress :    Social Connections:      Frequency of Communication with Friends and Family:      Frequency of Social Gatherings with Friends and Family:      Attends Baptism Services:      Active Member of Clubs or Organizations:      Attends Club or Organization Meetings:      Marital Status:    Intimate Partner Violence:      Fear of Current or Ex-Partner:      Emotionally Abused:      Physically Abused:       "Sexually Abused:        Review of Systems:  Skin:  Negative       Eyes:  Positive for glasses    ENT:  Negative      Respiratory:  Positive for dyspnea on exertion     Cardiovascular:  Negative for;chest pain;palpitations Positive for;dizziness;edema    Gastroenterology: Negative      Genitourinary:  Negative      Musculoskeletal:  Positive for joint pain;back pain    Neurologic:  Negative      Psychiatric:  Negative      Heme/Lymph/Imm:  Positive for allergies    Endocrine:  Negative        Physical Exam:  Vitals: /78 (BP Location: Left arm, Patient Position: Sitting, Cuff Size: Adult Large)   Pulse 64   Ht 1.835 m (6' 0.25\")   Wt 111.5 kg (245 lb 12.8 oz)   SpO2 95%   BMI 33.11 kg/m      Constitutional:  cooperative, alert and oriented, well developed, well nourished, in no acute distress        Skin:  warm and dry to the touch      (cyanotic nose)    Head:  normocephalic        Eyes:           Lymph:      ENT:  no pallor or cyanosis, dentition good        Neck:  no stiffness JVP 8-10      Respiratory:  clear to auscultation prolonged expiration;expiratory wheezes;diminished breath sounds bilaterally       Cardiac:   irregularly irregular rhythm   no presence of murmur          pulses full and equal                                        GI:  non-tender;abdomen soft;BS normoactive obese      Extremities and Muscular Skeletal:  no deformities, clubbing, cyanosis, erythema observed;no edema   bilateral LE edema;trace trace;RLE edema trace;LLE edema      Neurological:  no gross motor deficits;affect appropriate        Psych:  Alert and Oriented x 3        CC  No referring provider defined for this encounter.                  Thank you for allowing me to participate in the care of your patient.      Sincerely,     JAI EPPS MD     [facility]  cc:   No referring provider defined for this encounter.        "

## 2021-07-20 NOTE — PROGRESS NOTES
Service Date: 2021    [place] [address]    RE:      Kevin Partida   MRN:  [id number]  :   1935    Dear Dr. Gee:    I had the opportunity to see Mr. Kevin Partida in Cardiology Clinic today at [facility] in [place].  He is an 86-year-old gentleman with a history of ischemic cardiomyopathy and chronic systolic heart failure complicated by chronic atrial fibrillation and COPD.  He is being treated with a strategy of rate control and anticoagulation for his atrial fibrillation and has not had any identifiable symptoms related to that rhythm issue.  He has chronic shortness of breath and is able to walk only short distances without stopping to catch his breath.  He does better if he is able to lean on a grocery store cart.  He still likes to get outside and try to be active in the woods, but admits that his activity level is declining.  He has occasional mild lightheadedness when he stands up quickly, but no sudden lightheadedness or syncope.  He has no chest pain.  He has no PND or orthopnea.    His last echocardiogram was performed in 2021 showing a stable ejection fraction of 20%-25%.    PHYSICAL EXAMINATION:  Today his blood pressure is 110/78, heart rate 64 and weight 245 pounds and stable.  His lungs sound clear.  Heart rhythm is irregularly irregular without a significant cardiac murmur.    IMPRESSIONS:  Mr. Kevin Partida is an 86-year-old gentleman with hypertension, dyslipidemia, coronary artery disease and ischemic cardiomyopathy with an ejection fraction of 20%-25%.  He has chronic systolic heart failure, New York Heart Association class II-III mixed with some degree of COPD as well.  His blood pressure and cholesterol numbers are under excellent control, and he is on relatively good medication for his heart failure.  Unfortunately, Entresto and Jardiance are too expensive for him  at this time.  He remains on lisinopril and a low dose of furosemide in addition to carvedilol.    I did an EKG today, which demonstrated atrial fibrillation with a left bundle branch block, QRS duration of 144 msec.  Based on this, I again recommended that he consider a biventricular ICD for resynchronization therapy as well as prevention of sudden cardiac death.  I have talked to him about this many times, and he has consistently refused.  Today I explained the possible benefit of feeling better with biventricular pacing, and he is willing to think about it.  I will not make any medication changes today.  I will plan to see him back again in 6 months for reevaluation.    Jaquan Platt MD, [facility]        D: 2021   T: 2021   MT: [email or web address]    Name:     MAHIN MCCANN  MRN:      [id number]        Account:      [id number]   :      1935           Service Date: 2021       Document: [id number]

## 2021-08-12 ENCOUNTER — LAB (OUTPATIENT)
Dept: LAB | Facility: CLINIC | Age: 86
End: 2021-08-12
Payer: COMMERCIAL

## 2021-08-12 ENCOUNTER — ANTICOAGULATION THERAPY VISIT (OUTPATIENT)
Dept: ANTICOAGULATION | Facility: CLINIC | Age: 86
End: 2021-08-12

## 2021-08-12 DIAGNOSIS — I48.20 CHRONIC ATRIAL FIBRILLATION (H): ICD-10-CM

## 2021-08-12 DIAGNOSIS — Z79.01 LONG TERM CURRENT USE OF ANTICOAGULANT THERAPY: ICD-10-CM

## 2021-08-12 DIAGNOSIS — I50.22 CHRONIC SYSTOLIC HEART FAILURE (H): ICD-10-CM

## 2021-08-12 DIAGNOSIS — Z79.01 LONG TERM CURRENT USE OF ANTICOAGULANT THERAPY: Primary | ICD-10-CM

## 2021-08-12 LAB
ANION GAP SERPL CALCULATED.3IONS-SCNC: 2 MMOL/L (ref 3–14)
BUN SERPL-MCNC: 28 MG/DL (ref 7–30)
CALCIUM SERPL-MCNC: 8.9 MG/DL (ref 8.5–10.1)
CHLORIDE BLD-SCNC: 107 MMOL/L (ref 94–109)
CO2 SERPL-SCNC: 30 MMOL/L (ref 20–32)
CREAT SERPL-MCNC: 1.13 MG/DL (ref 0.66–1.25)
GFR SERPL CREATININE-BSD FRML MDRD: 59 ML/MIN/1.73M2
GLUCOSE BLD-MCNC: 127 MG/DL (ref 70–99)
HGB BLD-MCNC: 16.1 G/DL (ref 13.3–17.7)
INR BLD: 2.5 (ref 0.9–1.1)
POTASSIUM BLD-SCNC: 4.8 MMOL/L (ref 3.4–5.3)
SODIUM SERPL-SCNC: 139 MMOL/L (ref 133–144)

## 2021-08-12 PROCEDURE — 36415 COLL VENOUS BLD VENIPUNCTURE: CPT

## 2021-08-12 PROCEDURE — 80048 BASIC METABOLIC PNL TOTAL CA: CPT | Performed by: INTERNAL MEDICINE

## 2021-08-12 PROCEDURE — 85610 PROTHROMBIN TIME: CPT

## 2021-08-12 PROCEDURE — 85018 HEMOGLOBIN: CPT | Performed by: INTERNAL MEDICINE

## 2021-08-12 NOTE — PROGRESS NOTES
ANTICOAGULATION MANAGEMENT     Kevin Partida 86 year old male is on warfarin with therapeutic INR result. (Goal INR 2.0-3.0)    Recent labs: (last 7 days)     08/12/21  0955   INR 2.5*       ASSESSMENT     Source(s): Chart Review and Patient/Caregiver Call       Warfarin doses taken: Warfarin taken as instructed    Diet: No new diet changes identified    New illness, injury, or hospitalization: No    Medication/supplement changes: None noted    Signs or symptoms of bleeding or clotting: No    Previous INR: Therapeutic last 2(+) visits    Additional findings: None     PLAN     Recommended plan for no diet, medication or health factor changes affecting INR     Dosing Instructions: Continue your current warfarin dose with next INR in 6 weeks       Summary  As of 8/12/2021    Full warfarin instructions:  5 mg every day   Next INR check:  9/23/2021             Telephone call with Kevin who verbalizes understanding and agrees to plan and who agrees to plan and repeated back plan correctly    Lab visit scheduled    Education provided: Please call back if any changes to your diet, medications or how you've been taking warfarin    Plan made per [facility] anticoagulation protocol    Diego Almanzar, RN  Anticoagulation Clinic  8/12/2021    _______________________________________________________________________     Anticoagulation Episode Summary     Current INR goal:  2.0-3.0   TTR:  71.4 % (1 y)   Target end date:  Indefinite   Send INR reminders to:  [facility]    Indications    Long term current use of anticoagulant therapy [Z79.01]  Chronic atrial fibrillation (H) [I48.20]           Comments:  5 mg tabs, PM dose, print out, BP         Anticoagulation Care Providers     Provider Role Specialty Phone number    Chad Gee DO [facility] [phone]

## 2021-08-29 DIAGNOSIS — N40.1 BENIGN PROSTATIC HYPERPLASIA WITH URINARY RETENTION: ICD-10-CM

## 2021-08-29 DIAGNOSIS — R33.8 BENIGN PROSTATIC HYPERPLASIA WITH URINARY RETENTION: ICD-10-CM

## 2021-08-30 RX ORDER — FINASTERIDE 5 MG/1
TABLET, FILM COATED ORAL
Qty: 30 TABLET | Refills: 0 | Status: SHIPPED | OUTPATIENT
Start: 2021-08-30 | End: 2021-09-24

## 2021-08-30 NOTE — TELEPHONE ENCOUNTER
Pending Prescriptions:                       Disp   Refills    finasteride (PROSCAR) 5 MG tablet [Pharma*30 tab*8            Sig: TAKE ONE TABLET BY MOUTH ONCE DAILY    Medication is being filled for 1 time shamir refill only due to:  Patient is due for follow up with primary care provider.    Please call and help schedule.  Thank you!

## 2021-09-23 ENCOUNTER — LAB (OUTPATIENT)
Dept: LAB | Facility: CLINIC | Age: 86
End: 2021-09-23
Payer: COMMERCIAL

## 2021-09-23 ENCOUNTER — OFFICE VISIT (OUTPATIENT)
Dept: INTERNAL MEDICINE | Facility: CLINIC | Age: 86
End: 2021-09-23
Payer: COMMERCIAL

## 2021-09-23 ENCOUNTER — ANTICOAGULATION THERAPY VISIT (OUTPATIENT)
Dept: ANTICOAGULATION | Facility: CLINIC | Age: 86
End: 2021-09-23

## 2021-09-23 VITALS
DIASTOLIC BLOOD PRESSURE: 68 MMHG | HEIGHT: 72 IN | HEART RATE: 74 BPM | BODY MASS INDEX: 33.18 KG/M2 | OXYGEN SATURATION: 98 % | WEIGHT: 245 LBS | TEMPERATURE: 98.6 F | SYSTOLIC BLOOD PRESSURE: 118 MMHG | RESPIRATION RATE: 18 BRPM

## 2021-09-23 DIAGNOSIS — N40.1 BENIGN PROSTATIC HYPERPLASIA WITH URINARY RETENTION: ICD-10-CM

## 2021-09-23 DIAGNOSIS — R33.8 BENIGN PROSTATIC HYPERPLASIA WITH URINARY RETENTION: ICD-10-CM

## 2021-09-23 DIAGNOSIS — E78.5 HYPERLIPIDEMIA LDL GOAL <100: ICD-10-CM

## 2021-09-23 DIAGNOSIS — Z79.01 LONG TERM CURRENT USE OF ANTICOAGULANT THERAPY: ICD-10-CM

## 2021-09-23 DIAGNOSIS — I48.20 CHRONIC ATRIAL FIBRILLATION (H): ICD-10-CM

## 2021-09-23 DIAGNOSIS — Z79.01 LONG TERM CURRENT USE OF ANTICOAGULANT THERAPY: Primary | ICD-10-CM

## 2021-09-23 DIAGNOSIS — I48.20 CHRONIC ATRIAL FIBRILLATION (H): Primary | ICD-10-CM

## 2021-09-23 DIAGNOSIS — I10 HYPERTENSION GOAL BP (BLOOD PRESSURE) < 140/90: ICD-10-CM

## 2021-09-23 LAB
ALBUMIN SERPL-MCNC: 3.8 G/DL (ref 3.4–5)
ALP SERPL-CCNC: 85 U/L (ref 40–150)
ALT SERPL W P-5'-P-CCNC: 23 U/L (ref 0–70)
AST SERPL W P-5'-P-CCNC: 19 U/L (ref 0–45)
BILIRUB DIRECT SERPL-MCNC: 0.2 MG/DL (ref 0–0.2)
BILIRUB SERPL-MCNC: 0.9 MG/DL (ref 0.2–1.3)
CHOLEST SERPL-MCNC: 142 MG/DL
FASTING STATUS PATIENT QL REPORTED: YES
HDLC SERPL-MCNC: 39 MG/DL
INR BLD: 2.1 (ref 0.9–1.1)
LDLC SERPL CALC-MCNC: 66 MG/DL
NONHDLC SERPL-MCNC: 103 MG/DL
PROT SERPL-MCNC: 7.1 G/DL (ref 6.8–8.8)
TRIGL SERPL-MCNC: 185 MG/DL

## 2021-09-23 PROCEDURE — 99214 OFFICE O/P EST MOD 30 MIN: CPT | Performed by: INTERNAL MEDICINE

## 2021-09-23 PROCEDURE — 80061 LIPID PANEL: CPT | Performed by: INTERNAL MEDICINE

## 2021-09-23 PROCEDURE — 85610 PROTHROMBIN TIME: CPT

## 2021-09-23 PROCEDURE — 80076 HEPATIC FUNCTION PANEL: CPT | Performed by: INTERNAL MEDICINE

## 2021-09-23 PROCEDURE — 36415 COLL VENOUS BLD VENIPUNCTURE: CPT | Performed by: INTERNAL MEDICINE

## 2021-09-23 ASSESSMENT — PAIN SCALES - GENERAL: PAINLEVEL: NO PAIN (0)

## 2021-09-23 ASSESSMENT — MIFFLIN-ST. JEOR: SCORE: 1829.31

## 2021-09-23 NOTE — PROGRESS NOTES
Alan Mesa is a 86 year old who presents for the following health issues:    HPI     Medication Followup  Refill meds     Taking Medication as prescribed: yes    Side Effects:  None    Medication Helping Symptoms:  yes        EMR reviewed including:             Complaint, History of Chief Complaint, Corresponding Review of Systems, and Complaint Specific Physical Examination.    #1   Chronic atrial fibrillation  Patient does not report palpitations or chest discomfort on today's visit.  Patient reports adherence to warfarin and aspirin medication use to prevent clots due to atrial fibrillation.        Exam:   HEART:  regular without rubs, clicks, gallops, or murmurs. PMI is nondisplaced. Upstrokes are brisk. S1,S2 are heard.   LUNGS: clear bilaterally, airflow is brisk, no intercostal retraction or stridor is noted. No coughing is noted during visit.    #2   Hypertension  On today's visit, patient blood pressure presents at 118/68.  Patient reports adherence to lisinopril and carvedilol for management of high blood pressure.  No lightheadedness, dizziness, or weakness reported upon standing, sitting, or supine.        Exam:   SKIN:  warm and dry. No erythema, or rashes are noted. No specific lesions of concern are noted.   See above    #3   Hyperlipidemia  Patient reports adherence to simvastatin to manage hyperlipidemia.  No changes in diet or exercise; no reported weight gain for the patient yet patient is still overweight.  Patient reports no skin changes to lower extremities.        Exam:  See above          Vital Signs:   /68   Pulse 74   Temp 98.6  F (37  C) (Temporal)   Resp 18   Ht 1.829 m (6')   Wt 111.1 kg (245 lb)   SpO2 98%   BMI 33.23 kg/m               Decision Making    Problem and Complexity     1. Chronic atrial fibrillation (H)  No changes in medications at this time for chronic atrial fibrillation; continued use of aspirin and warfarin to prevent clotting.  No changes in  medications and no refills needed at this time.    2. Hypertension goal BP (blood pressure) < 140/90  Blood pressure on today's visit is 118/68.  No changes in medications for blood pressure management with lisinopril and carvedilol.  Hepatic panel and lipid panel ordered and completed on today's visit.  No changes in medications and no refills needed at this time.  - Hepatic panel (Albumin, ALT, AST, Bili, Alk Phos, TP)  - Lipid Profile (Chol, Trig, HDL, LDL calc)    3. Hyperlipidemia LDL goal <100  Continued management with simvastatin for hyperlipidemia.  No changes in medications and no refills needed at this time.  Patient sent for lipid panel to monitor hyperlipidemia.                                    FOLLOW UP   I have asked the patient to make an appointment for followup with me in 6 months unless sooner date is needed due to any pertinent findings on lab results.          I have carefully explained the diagnosis and treatment options to the patient.  The patient has displayed an understanding of the above, and all subsequent questions were answered.      This patient has been interviewed, examined, diagnosed, and informed of the above by me personally.  Medical records and available pertinent information has been reviewed by me personally.  All decisions and discussion have been between myself and the patient/family.  This was done in the presence of Dominick Jha  , who acted as a medical scribe and recorded the events above.  No diagnosis or decision making was made by the above-mentioned scribe.  The patient, and or his/her ensurors will not be billed for the presence or actions of this scribe.  The information recorded by the scribe has been reviewed by me and found to be accurate.    DO TETE Fierro    Portions of this note were produced using [employer]  Although every attempt at real-time proof reading has been made, occasional grammar/syntax errors may have been  missed.

## 2021-09-23 NOTE — PROGRESS NOTES
ANTICOAGULATION MANAGEMENT     Kevin Partida 86 year old male is on warfarin with therapeutic INR result. (Goal INR 2.0-3.0)    Recent labs: (last 7 days)     09/23/21  1007   INR 2.1*       ASSESSMENT     Source(s): Chart Review       Warfarin doses taken: Reviewed in chart    Diet: No new diet changes identified    New illness, injury, or hospitalization: No    Medication/supplement changes: None noted    Signs or symptoms of bleeding or clotting: No    Previous INR: Therapeutic last 2(+) visits    Additional findings: None     PLAN     Recommended plan for no diet, medication or health factor changes affecting INR     Dosing Instructions: Continue your current warfarin dose with next INR in 6 weeks       Summary  As of 9/23/2021    Full warfarin instructions:  5 mg every day   Next INR check:  11/4/2021             Detailed voice message left for Kevin with dosing instructions and follow up date.     Contact [phone]  to schedule and with any changes, questions or concerns.     Education provided: Please call back if any changes to your diet, medications or how you've been taking warfarin    Plan made per [facility] anticoagulation protocol    Nuha Murray RN  Anticoagulation Clinic  9/23/2021    _______________________________________________________________________     Anticoagulation Episode Summary     Current INR goal:  2.0-3.0   TTR:  71.4 % (1 y)   Target end date:  Indefinite   Send INR reminders to:  DOMINIQUE CERVANTES    Indications    Long term current use of anticoagulant therapy [Z79.01]  Chronic atrial fibrillation (H) [I48.20]           Comments:  5 mg tabs, PM dose         Anticoagulation Care Providers     Provider Role Specialty Phone number    Chad Gee DO [facility] Internal [facility] [phone]

## 2021-09-24 RX ORDER — FINASTERIDE 5 MG/1
TABLET, FILM COATED ORAL
Qty: 90 TABLET | Refills: 1 | Status: SHIPPED | OUTPATIENT
Start: 2021-09-24 | End: 2022-01-01

## 2021-09-24 NOTE — TELEPHONE ENCOUNTER
Prescription approved per [facility] Refill Protocol.    SHAISTA DanielleN, RN  [facility]

## 2021-10-04 ENCOUNTER — HOSPITAL ENCOUNTER (OUTPATIENT)
Dept: CARDIOLOGY | Facility: CLINIC | Age: 86
Discharge: HOME | End: 2021-10-04
Attending: INTERNAL MEDICINE | Admitting: INTERNAL MEDICINE
Payer: MEDICARE

## 2021-10-04 ENCOUNTER — CARE COORDINATION (OUTPATIENT)
Dept: CARDIOLOGY | Facility: CLINIC | Age: 86
End: 2021-10-04

## 2021-10-04 ENCOUNTER — TELEPHONE (OUTPATIENT)
Dept: ANTICOAGULATION | Facility: CLINIC | Age: 86
End: 2021-10-04

## 2021-10-04 DIAGNOSIS — Z79.01 LONG TERM CURRENT USE OF ANTICOAGULANT THERAPY: Primary | ICD-10-CM

## 2021-10-04 DIAGNOSIS — I50.22 CHRONIC SYSTOLIC HEART FAILURE (H): ICD-10-CM

## 2021-10-04 DIAGNOSIS — I48.20 CHRONIC ATRIAL FIBRILLATION (H): ICD-10-CM

## 2021-10-04 LAB — BI-PLANE LVEF ECHO: NORMAL

## 2021-10-04 PROCEDURE — 93306 TTE W/DOPPLER COMPLETE: CPT | Mod: 26 | Performed by: INTERNAL MEDICINE

## 2021-10-04 PROCEDURE — 999N000208 ECHOCARDIOGRAM COMPLETE

## 2021-10-04 PROCEDURE — 255N000002 HC RX 255 OP 636: Performed by: INTERNAL MEDICINE

## 2021-10-04 RX ADMIN — HUMAN ALBUMIN MICROSPHERES AND PERFLUTREN 6 ML: 10; .22 INJECTION, SOLUTION INTRAVENOUS at 13:43

## 2021-10-04 NOTE — TELEPHONE ENCOUNTER
ANTICOAGULATION MANAGEMENT      Kevin Partida due for annual renewal of referral to anticoagulation monitoring. Order pended for your review and signature.      ANTICOAGULATION SUMMARY      Warfarin indication(s)     Atrial fibrillation    Heart valve present?  NO       Current goal range   INR: 2.0-3.0     Goal appropriate for indication? Yes, INR 2-3 appropriate for hx of DVT, PE, hypercoagulable state, Afib, LVAD, or bileaflet AVR without risk factors     Current duration of therapy Indefinite/long term therapy   Time in Therapeutic Range (TTR)  (Goal > 60%) 71.4%       Office visit with referring provider's group within last year yes on 9/23/21       Diego Almanzar RN

## 2021-10-04 NOTE — PROGRESS NOTES
"Echo results from 10/4/21 noted. Pt had 7/20/21 visit with . Per , \"  did an EKG today, which demonstrated atrial fibrillation with a left bundle branch block, QRS duration of 144 msec.  Based on this, I again recommended that he consider a biventricular ICD for resynchronization therapy as well as prevention of sudden cardiac death.  I have talked to him about this many times, and he has consistently refused.  Today I explained the possible benefit of feeling better with biventricular pacing, and he is willing to think about it.\"    EF has improved to 35%. Pt has next follow up 11/9/21. Will update  to review echo and see if he has any other recommendations at this time. Debbie MORE October 4, 2021, 5:01 PM        "

## 2021-10-22 ENCOUNTER — TELEPHONE (OUTPATIENT)
Dept: INTERNAL MEDICINE | Facility: CLINIC | Age: 86
End: 2021-10-22

## 2021-10-22 NOTE — TELEPHONE ENCOUNTER
Spoke with spouse Irlanda and informed her that Moshe's INR is due on 11/4. She states he is coming in on 11/9 to see Dr Platt so we scheduled his INR for this day as well. Irlanda verbalized understanding and agrees with plan of care. She had no further questions or concerns at this time. Diego Almanzar RN, BSN  [facility] Anticoagulation Team

## 2021-10-22 NOTE — TELEPHONE ENCOUNTER
Reason for Call:  Other appointment    Detailed comments: is wondering when he is due to come in for his next INR    Phone Number Patient can be reached at: [phone]    Best Time: any    Can we leave a detailed message on this number? YES    Call taken on 10/22/2021 at 11:10 AM by Cindy Sykes

## 2021-10-23 ENCOUNTER — HEALTH MAINTENANCE LETTER (OUTPATIENT)
Age: 86
End: 2021-10-23

## 2021-10-25 DIAGNOSIS — I25.10 CORONARY ARTERY DISEASE INVOLVING NATIVE CORONARY ARTERY WITHOUT ANGINA PECTORIS: ICD-10-CM

## 2021-10-25 RX ORDER — CARVEDILOL 12.5 MG/1
12.5 TABLET ORAL 2 TIMES DAILY WITH MEALS
Qty: 180 TABLET | Refills: 2 | Status: SHIPPED | OUTPATIENT
Start: 2021-10-25 | End: 2022-01-01

## 2021-11-09 ENCOUNTER — LAB (OUTPATIENT)
Dept: LAB | Facility: CLINIC | Age: 86
End: 2021-11-09
Payer: COMMERCIAL

## 2021-11-09 ENCOUNTER — ANTICOAGULATION THERAPY VISIT (OUTPATIENT)
Dept: ANTICOAGULATION | Facility: CLINIC | Age: 86
End: 2021-11-09

## 2021-11-09 ENCOUNTER — OFFICE VISIT (OUTPATIENT)
Dept: CARDIOLOGY | Facility: CLINIC | Age: 86
End: 2021-11-09
Attending: INTERNAL MEDICINE
Payer: COMMERCIAL

## 2021-11-09 VITALS
WEIGHT: 249.8 LBS | HEART RATE: 70 BPM | BODY MASS INDEX: 33.83 KG/M2 | OXYGEN SATURATION: 93 % | HEIGHT: 72 IN | SYSTOLIC BLOOD PRESSURE: 106 MMHG | DIASTOLIC BLOOD PRESSURE: 62 MMHG

## 2021-11-09 DIAGNOSIS — I10 HYPERTENSION GOAL BP (BLOOD PRESSURE) < 140/90: ICD-10-CM

## 2021-11-09 DIAGNOSIS — I48.20 CHRONIC ATRIAL FIBRILLATION (H): ICD-10-CM

## 2021-11-09 DIAGNOSIS — J43.9 PULMONARY EMPHYSEMA, UNSPECIFIED EMPHYSEMA TYPE (H): ICD-10-CM

## 2021-11-09 DIAGNOSIS — I50.22 CHRONIC SYSTOLIC HEART FAILURE (H): ICD-10-CM

## 2021-11-09 DIAGNOSIS — I25.10 CORONARY ARTERY DISEASE INVOLVING NATIVE CORONARY ARTERY OF NATIVE HEART WITHOUT ANGINA PECTORIS: Primary | ICD-10-CM

## 2021-11-09 DIAGNOSIS — I25.5 ISCHEMIC CARDIOMYOPATHY: ICD-10-CM

## 2021-11-09 DIAGNOSIS — Z79.01 LONG TERM CURRENT USE OF ANTICOAGULANT THERAPY: ICD-10-CM

## 2021-11-09 DIAGNOSIS — E78.5 HYPERLIPIDEMIA LDL GOAL <70: ICD-10-CM

## 2021-11-09 DIAGNOSIS — Z79.01 LONG TERM CURRENT USE OF ANTICOAGULANT THERAPY: Primary | ICD-10-CM

## 2021-11-09 LAB — INR BLD: 1.9 (ref 0.9–1.1)

## 2021-11-09 PROCEDURE — 85610 PROTHROMBIN TIME: CPT

## 2021-11-09 PROCEDURE — 99214 OFFICE O/P EST MOD 30 MIN: CPT | Performed by: INTERNAL MEDICINE

## 2021-11-09 PROCEDURE — 36416 COLLJ CAPILLARY BLOOD SPEC: CPT

## 2021-11-09 ASSESSMENT — MIFFLIN-ST. JEOR: SCORE: 1851.09

## 2021-11-09 NOTE — LETTER
11/9/2021    Chad Gee, DO  [address] [place] [address]    RE: Kevin Partida       Dear Colleague,    I had the pleasure of seeing Kevin Partida in the [facility].    HPI and Plan:   See dictation    Today's clinic visit entailed:  Review of the result(s) of each unique test - BMP, FLP, ECHO  Ordering of each unique test  Prescription drug management  30 minutes spent on the date of the encounter doing chart review, review of test results, patient visit and documentation   Provider  Link to [facility] Help Grid     The level of medical decision making during this visit was of moderate complexity.    Orders Placed This Encounter   Procedures     Follow-Up with Cardiologist       No orders of the defined types were placed in this encounter.      There are no discontinued medications.      Encounter Diagnoses   Name Primary?     Chronic systolic heart failure (H)      Coronary artery disease involving native coronary artery of native heart without angina pectoris Yes     Hyperlipidemia LDL goal <70      Ischemic cardiomyopathy      Hypertension goal BP (blood pressure) < 140/90      Pulmonary emphysema, unspecified emphysema type (H)      Chronic atrial fibrillation (H)        CURRENT MEDICATIONS:  Current Outpatient Medications   Medication Sig Dispense Refill     aspirin 81 MG tablet Take 1 tablet by mouth daily.       carvedilol (COREG) 12.5 MG tablet Take 1 tablet (12.5 mg) by mouth 2 times daily (with meals) 180 tablet 2     finasteride (PROSCAR) 5 MG tablet TAKE ONE TABLET BY MOUTH ONCE DAILY 90 tablet 1     furosemide (LASIX) 20 MG tablet Take 1 tablet (20 mg) by mouth daily 90 tablet 2     lisinopril (ZESTRIL) 10 MG tablet Take 1 tablet (10 mg) by mouth daily 90 tablet 3     simvastatin (ZOCOR) 80 MG tablet Take 1 tablet (80 mg) by mouth daily 90 tablet 2     warfarin ANTICOAGULANT (JANTOVEN ANTICOAGULANT) 5 MG tablet Take 5 mg  daily, or as directed by the Coumadin Clinic. 90 tablet 1       ALLERGIES     Allergies   Allergen Reactions     Codeine GI Disturbance     Niacin Hives     got very red and flushed.  Doesn't want       PAST MEDICAL HISTORY:  Past Medical History:   Diagnosis Date     Atrial fibrillation (H) 07    Admit. Discharged 04/15/07     Atrial flutter (H)      Coronary artery disease      Headache(784.0)      Hepatitis, unspecified      Hyperlipidaemia      Hypertension      Measles without mention of complication     Measles     Mumps without mention of complication     Mumps     Orchitis and epididymitis, unspecified      Other and unspecified hyperlipidemia      Other emphysema (H)      Other speech disturbance     Stuttering     Pneumonia, organism unspecified(486)     Pneumonia     Shortness of breath      Urinary obstruction      Varicella without mention of complication     Chickenpox       PAST SURGICAL HISTORY:  Past Surgical History:   Procedure Laterality Date     COLONOSCOPY  2011    Procedure:COLONOSCOPY; colonoscopy; Surgeon:IVETH WIGGINS; Location:[facility]     HC LAPAROSCOPY, SURGICAL; CHOLECYSTECTOMY      Cholecystectomy, Laparoscopic     HC REMOVAL OF TONSILS,<13 Y/O      Tonsils <12y.o.     LAMINECT/DISCECTOMY, LUMBAR  08    Right L4-L5 microlumbar diskectomy.     ZZHC COLONOSCOPY W/WO BRUSH/WASH  06       FAMILY HISTORY:  Family History   Problem Relation Age of Onset     Alzheimer Disease Mother         ?dimentia or alzheimers     Diabetes Mother         insulin     EYE* Mother         cataract     Cerebrovascular Disease Mother      Cancer Father         lymph tumor of glands     Diabetes Maternal Aunt         ? oral or insulin     Diabetes Other         4 cousins insulin dependent     Genetic Disorder Maternal Grandmother         tremors     Heart Disease Paternal Uncle         ? at age 60-70     Neurologic Disorder Paternal Uncle         parkinsons     Cerebrovascular Disease  Paternal Uncle         ? dued at age 60-70       SOCIAL HISTORY:  Social History     Socioeconomic History     Marital status:      Spouse name: Irlanda     Number of children: 2     Years of education: 12+     Highest education level: Not on file   Occupational History     Occupation: retired telephone tech     Employer: [employer]   Tobacco Use     Smoking status: Former Smoker     Packs/day: 0.50     Types: Cigarettes     Smokeless tobacco: Never Used     Tobacco comment: smoked for 57 years- since age 9   Substance and Sexual Activity     Alcohol use: Yes     Alcohol/week: 0.0 standard drinks     Comment: socially     Drug use: No     Sexual activity: Not Currently     Partners: Female   Other Topics Concern      Service Yes     Comment: [employer] x 8 yrs     Blood Transfusions No     Caffeine Concern No     Comment: 6 cups coffee/day     Occupational Exposure No     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 Yes   Social History Narrative     Not on file     Social Determinants of Health     Financial Resource Strain: Not on file   Food Insecurity: Not on file   Transportation Needs: Not on file   Physical Activity: Not on file   Stress: Not on file   Social Connections: Not on file   Intimate Partner Violence: Not on file   Housing Stability: Not on file       Review of Systems:  Skin:  Negative       Eyes:  Positive for glasses    ENT:  Negative      Respiratory:  Positive for dyspnea on exertion COPD   Cardiovascular:  Negative for;palpitations;chest pain;edema Positive  for;lightheadedness;dizziness with position changes  Gastroenterology: Negative      Genitourinary:  Negative      Musculoskeletal:  Negative      Neurologic:  Negative      Psychiatric:  Negative      Heme/Lymph/Imm:  Positive for allergies    Endocrine:  Negative        Physical Exam:  Vitals: /62 (BP Location: Right arm, Patient Position: Sitting, Cuff Size: Adult Regular)   Pulse 70   Ht 1.829 m (6')   Wt 113.3 kg (249 lb 12.8 oz)   SpO2 93%   BMI 33.88 kg/m      Constitutional:           Skin:             Head:           Eyes:           Lymph:      ENT:           Neck:           Respiratory:            Cardiac:                                                           GI:           Extremities and Muscular Skeletal:                 Neurological:           Psych:           CC  Jaquan Platt MD  [address]  [place] [address]                  Thank you for allowing me to participate in the care of your patient.      Sincerely,     JAQUAN PLATT MD     [facility]  cc:   Jaquan Platt MD  [address]  [place] [address]

## 2021-11-09 NOTE — PROGRESS NOTES
HPI and Plan:   See dictation    Today's clinic visit entailed:  Review of the result(s) of each unique test - BMP, FLP, ECHO  Ordering of each unique test  Prescription drug management  30 minutes spent on the date of the encounter doing chart review, review of test results, patient visit and documentation   Provider  Link to [facility] Help Grid     The level of medical decision making during this visit was of moderate complexity.    Orders Placed This Encounter   Procedures     Follow-Up with Cardiologist       No orders of the defined types were placed in this encounter.      There are no discontinued medications.      Encounter Diagnoses   Name Primary?     Chronic systolic heart failure (H)      Coronary artery disease involving native coronary artery of native heart without angina pectoris Yes     Hyperlipidemia LDL goal <70      Ischemic cardiomyopathy      Hypertension goal BP (blood pressure) < 140/90      Pulmonary emphysema, unspecified emphysema type (H)      Chronic atrial fibrillation (H)        CURRENT MEDICATIONS:  Current Outpatient Medications   Medication Sig Dispense Refill     aspirin 81 MG tablet Take 1 tablet by mouth daily.       carvedilol (COREG) 12.5 MG tablet Take 1 tablet (12.5 mg) by mouth 2 times daily (with meals) 180 tablet 2     finasteride (PROSCAR) 5 MG tablet TAKE ONE TABLET BY MOUTH ONCE DAILY 90 tablet 1     furosemide (LASIX) 20 MG tablet Take 1 tablet (20 mg) by mouth daily 90 tablet 2     lisinopril (ZESTRIL) 10 MG tablet Take 1 tablet (10 mg) by mouth daily 90 tablet 3     simvastatin (ZOCOR) 80 MG tablet Take 1 tablet (80 mg) by mouth daily 90 tablet 2     warfarin ANTICOAGULANT (JANTOVEN ANTICOAGULANT) 5 MG tablet Take 5 mg daily, or as directed by the Coumadin Clinic. 90 tablet 1       ALLERGIES     Allergies   Allergen Reactions     Codeine GI Disturbance     Niacin Hives     got very red and flushed.  Doesn't want       PAST MEDICAL HISTORY:  Past Medical History:    Diagnosis Date     Atrial fibrillation (H) 07    Admit. Discharged 04/15/07     Atrial flutter (H)      Coronary artery disease      Headache(784.0)      Hepatitis, unspecified      Hyperlipidaemia      Hypertension      Measles without mention of complication     Measles     Mumps without mention of complication     Mumps     Orchitis and epididymitis, unspecified      Other and unspecified hyperlipidemia      Other emphysema (H)      Other speech disturbance     Stuttering     Pneumonia, organism unspecified(486)     Pneumonia     Shortness of breath      Urinary obstruction      Varicella without mention of complication     Chickenpox       PAST SURGICAL HISTORY:  Past Surgical History:   Procedure Laterality Date     COLONOSCOPY  2011    Procedure:COLONOSCOPY; colonoscopy; Surgeon:IVETH WIGGINS; Location:[facility]     HC LAPAROSCOPY, SURGICAL; CHOLECYSTECTOMY      Cholecystectomy, Laparoscopic     HC REMOVAL OF TONSILS,<13 Y/O      Tonsils <12y.o.     LAMINECT/DISCECTOMY, LUMBAR  08    Right L4-L5 microlumbar diskectomy.     ZZHC COLONOSCOPY W/WO BRUSH/WASH  06       FAMILY HISTORY:  Family History   Problem Relation Age of Onset     Alzheimer Disease Mother         ?dimentia or alzheimers     Diabetes Mother         insulin     EYE* Mother         cataract     Cerebrovascular Disease Mother      Cancer Father         lymph tumor of glands     Diabetes Maternal Aunt         ? oral or insulin     Diabetes Other         4 cousins insulin dependent     Genetic Disorder Maternal Grandmother         tremors     Heart Disease Paternal Uncle         ? at age 60-70     Neurologic Disorder Paternal Uncle         parkinsons     Cerebrovascular Disease Paternal Uncle         ? dued at age 60-70       SOCIAL HISTORY:  Social History     Socioeconomic History     Marital status:      Spouse name: Emogene     Number of children: 2     Years of education: 12+     Highest education level: Not  on file   Occupational History     Occupation: retired telephone tech     Employer: [employer]   Tobacco Use     Smoking status: Former Smoker     Packs/day: 0.50     Types: Cigarettes     Smokeless tobacco: Never Used     Tobacco comment: smoked for 57 years- since age 9   Substance and Sexual Activity     Alcohol use: Yes     Alcohol/week: 0.0 standard drinks     Comment: socially     Drug use: No     Sexual activity: Not Currently     Partners: Female   Other Topics Concern      Service Yes     Comment: [employer] x 8 yrs     Blood Transfusions No     Caffeine Concern No     Comment: 6 cups coffee/day     Occupational Exposure No     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 Yes   Social History Narrative     Not on file     Social Determinants of Health     Financial Resource Strain: Not on file   Food Insecurity: Not on file   Transportation Needs: Not on file   Physical Activity: Not on file   Stress: Not on file   Social Connections: Not on file   Intimate Partner Violence: Not on file   Housing Stability: Not on file       Review of Systems:  Skin:  Negative       Eyes:  Positive for glasses    ENT:  Negative      Respiratory:  Positive for dyspnea on exertion COPD   Cardiovascular:  Negative for;palpitations;chest pain;edema Positive for;lightheadedness;dizziness with position changes  Gastroenterology: Negative      Genitourinary:  Negative      Musculoskeletal:  Negative      Neurologic:  Negative      Psychiatric:  Negative      Heme/Lymph/Imm:  Positive for allergies    Endocrine:   Negative        Physical Exam:  Vitals: /62 (BP Location: Right arm, Patient Position: Sitting, Cuff Size: Adult Regular)   Pulse 70   Ht 1.829 m (6')   Wt 113.3 kg (249 lb 12.8 oz)   SpO2 93%   BMI 33.88 kg/m      Constitutional:           Skin:             Head:           Eyes:           Lymph:      ENT:           Neck:           Respiratory:            Cardiac:                                                           GI:           Extremities and Muscular Skeletal:                 Neurological:           Psych:           CC  Jaquan Platt MD  [address]  [place] [address]

## 2021-11-09 NOTE — PROGRESS NOTES
I have attempted to contact this patient regarding their INR of 1.9. I've left a message to return a call to the coumadin clinic, and will try again later.    [place] [facility] RN's can be reached for return calls at [phone] (internal staff only). Please do not give this number out to patients or cold transfer. Thank you!

## 2021-11-09 NOTE — LETTER
11/9/2021       RE: Kevin Partida  [address] [facility] [address]     Dear Colleague,    Thank you for referring your patient, Kevin Partida, to the [facility] [place] [facility]. Please see a copy of my visit note below.    HPI and Plan:   See dictation    Today's clinic visit entailed:  Review of the result(s) of each unique test - BMP, FLP, ECHO  Ordering of each unique test  Prescription drug management  30 minutes spent on the date of the encounter doing chart review, review of test results, patient visit and documentation   Provider  Link to [facility] Help Grid     The level of medical decision making during this visit was of moderate complexity.    Orders Placed This Encounter   Procedures     Follow-Up with Cardiologist       No orders of the defined types were placed in this encounter.      There are no discontinued medications.      Encounter Diagnoses   Name Primary?     Chronic systolic heart failure (H)      Coronary artery disease involving native coronary artery of native heart without angina pectoris Yes     Hyperlipidemia LDL goal <70      Ischemic cardiomyopathy      Hypertension goal BP (blood pressure) < 140/90      Pulmonary emphysema, unspecified emphysema type (H)      Chronic atrial fibrillation (H)        CURRENT MEDICATIONS:  Current Outpatient Medications   Medication Sig Dispense Refill     aspirin 81 MG tablet Take 1 tablet by mouth daily.       carvedilol (COREG) 12.5 MG tablet Take 1 tablet (12.5 mg) by mouth 2 times daily (with meals) 180 tablet 2     finasteride (PROSCAR) 5 MG tablet TAKE ONE TABLET BY MOUTH ONCE DAILY 90 tablet 1     furosemide (LASIX) 20 MG tablet Take 1 tablet (20 mg) by mouth daily 90 tablet 2     lisinopril (ZESTRIL) 10 MG tablet Take 1 tablet (10 mg) by mouth daily 90 tablet 3     simvastatin (ZOCOR) 80 MG tablet Take 1 tablet (80 mg) by mouth daily 90 tablet 2     warfarin  ANTICOAGULANT (JANTOVEN ANTICOAGULANT) 5 MG tablet Take 5 mg daily, or as directed by the Coumadin Clinic. 90 tablet 1       ALLERGIES     Allergies   Allergen Reactions     Codeine GI Disturbance     Niacin Hives     got very red and flushed.  Doesn't want       PAST MEDICAL HISTORY:  Past Medical History:   Diagnosis Date     Atrial fibrillation (H) 07    Admit. Discharged 04/15/07     Atrial flutter (H)      Coronary artery disease      Headache(784.0)      Hepatitis, unspecified      Hyperlipidaemia      Hypertension      Measles without mention of complication     Measles     Mumps without mention of complication     Mumps     Orchitis and epididymitis, unspecified      Other and unspecified hyperlipidemia      Other emphysema (H)      Other speech disturbance     Stuttering     Pneumonia, organism unspecified(486)     Pneumonia     Shortness of breath      Urinary obstruction      Varicella without mention of complication     Chickenpox       PAST SURGICAL HISTORY:  Past Surgical History:   Procedure Laterality Date     COLONOSCOPY  2011    Procedure:COLONOSCOPY; colonoscopy; Surgeon:IVETH WIGGINS; Location: GI     HC LAPAROSCOPY, SURGICAL; CHOLECYSTECTOMY      Cholecystectomy, Laparoscopic     HC REMOVAL OF TONSILS,<11 Y/O      Tonsils <12y.o.     LAMINECT/DISCECTOMY, LUMBAR  08    Right L4-L5 microlumbar diskectomy.     ZZHC COLONOSCOPY W/WO BRUSH/WASH  06       FAMILY HISTORY:  Family History   Problem Relation Age of Onset     Alzheimer Disease Mother         ?dimentia or alzheimers     Diabetes Mother         insulin     EYE* Mother         cataract     Cerebrovascular Disease Mother      Cancer Father         lymph tumor of glands     Diabetes Maternal Aunt         ? oral or insulin     Diabetes Other         4 cousins insulin dependent     Genetic Disorder Maternal Grandmother         tremors     Heart Disease Paternal Uncle         ? at age 60-70     Neurologic Disorder  Paternal Uncle         parkinsons     Cerebrovascular Disease Paternal Uncle         ? dued at age 60-70       SOCIAL HISTORY:  Social History     Socioeconomic History     Marital status:      Spouse name: Emogene     Number of children: 2     Years of education: 12+     Highest education level: Not on file   Occupational History     Occupation: retired telephone tech     Employer: [employer]   Tobacco Use     Smoking status: Former Smoker     Packs/day: 0.50     Types: Cigarettes     Smokeless tobacco: Never Used     Tobacco comment: smoked for 57 years- since age 9   Substance and Sexual Activity     Alcohol use: Yes     Alcohol/week: 0.0 standard drinks     Comment: socially     Drug use: No     Sexual activity: Not Currently     Partners: Female   Other Topics Concern      Service Yes     Comment: [employer] x 8 yrs     Blood Transfusions No     Caffeine Concern No     Comment: 6 cups coffee/day     Occupational Exposure No     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 Yes   Social History Narrative     Not on file     Social Determinants of Health     Financial Resource Strain: Not on file   Food Insecurity: Not on file   Transportation Needs: Not on file   Physical Activity: Not on file   Stress: Not on file   Social Connections: Not on file   Intimate Partner Violence: Not on file   Housing Stability: Not on file       Review of Systems:  Skin:  Negative       Eyes:  Positive for glasses    ENT:  Negative      Respiratory:  Positive for dyspnea on exertion COPD   Cardiovascular:   Negative for;palpitations;chest pain;edema Positive for;lightheadedness;dizziness with position changes  Gastroenterology: Negative      Genitourinary:  Negative      Musculoskeletal:  Negative      Neurologic:  Negative      Psychiatric:  Negative      Heme/Lymph/Imm:  Positive for allergies    Endocrine:  Negative        Physical Exam:  Vitals: /62 (BP Location: Right arm, Patient Position: Sitting, Cuff Size: Adult Regular)   Pulse 70   Ht 1.829 m (6')   Wt 113.3 kg (249 lb 12.8 oz)   SpO2 93%   BMI 33.88 kg/m      Constitutional:           Skin:             Head:           Eyes:           Lymph:      ENT:           Neck:           Respiratory:            Cardiac:                                                           GI:           Extremities and Muscular Skeletal:                 Neurological:           Psych:           CC  Jaquan Platt MD  [address]  [place] [address]                Service Date: 11/09/2021    HISTORY OF PRESENT ILLNESS:  I had the opportunity to see Mr. Kevin Partida in Cardiology Clinic today at [facility] in [place] for reevaluation of ischemic cardiomyopathy, coronary artery disease, chronic systolic heart failure, chronic atrial fibrillation, hypertension and dyslipidemia.  He also has COPD.  He is being treated with a strategy of rate control and anticoagulation for his atrial fibrillation and does not seem to be aware of that rhythm abnormality.    He has a lot of back pain issues, which seemed to be the primary limiting problem to his activity.  He tells me he can only walk a short distance or do work for only a few minutes before having to stop and sit down and rest his back.  For this reason, I am not sure what his functional capacity is.  He still tries to get out in the woods and check things out, but he does so on a 4-almendarez now rather than walking.  He has no chest discomfort or shortness of breath at rest.  No PND or orthopnea.   No lightheadedness or syncope.  Overall, he seems to be doing pretty well.    His laboratory studies from August and September looked good.  His basic metabolic panel is normal with a creatinine of 1.1 and potassium of 4.8.  His LDL is 66 and HDL 39 and his ALT and hemoglobin are normal.  He remains on warfarin for stroke prevention.    His echocardiogram from 10/04/2021 looked somewhat better.  His ejection fraction previously had been 20%-25% and was reported now to be 35%.  He does have some right ventricular enlargement, but no significant valvular disease.    PHYSICAL EXAMINATION:  His blood pressure is 106/62, heart rate 70 and weight 249 pounds.  His lungs are clear.  Heart rhythm is irregular without significant murmur and he has no edema.    IMPRESSIONS:  Mr. Kevin Partida is an 86-year-old gentleman with hypertension, dyslipidemia and coronary artery disease.  He has an ischemic cardiomyopathy with an ejection fraction, which has improved based on his most recent echocardiogram up to 35%.  He may have some mild chronic systolic heart failure, but is primarily limited by back pain issues at this time.  He also has COPD, which complicates the picture.  Fortunately, his blood pressure and cholesterol numbers remain excellent and he has normal renal function.  I have recommended Entresto and Jardiance to him in the past, but these have been too expensive.  He remains on lisinopril and carvedilol and furosemide.    He has chronic atrial fibrillation treated with rate control and anticoagulation and is currently using warfarin.  He does not seem to have any bleeding issues at this time.    I encouraged him to get a COVID vaccine.  I am not sure why he is hesitating.  I will plan to see him back again in a year if he wishes to return at that time.    cc:   Chad Gee DO   [address] [address]       Jaquan Platt MD, FACC        D: 11/09/2021   T:  2021   MT: RODOLFO    Name:     MAHIN MCCANN  MRN:      [id number]        Account:      [id number]   :      1935           Service Date: 2021     Document: [id number]

## 2021-11-09 NOTE — PROGRESS NOTES
Service Date: 11/09/2021    HISTORY OF PRESENT ILLNESS:  I had the opportunity to see Mr. Kevin Partida in Cardiology Clinic today at [facility] in [place] for reevaluation of ischemic cardiomyopathy, coronary artery disease, chronic systolic heart failure, chronic atrial fibrillation, hypertension and dyslipidemia.  He also has COPD.  He is being treated with a strategy of rate control and anticoagulation for his atrial fibrillation and does not seem to be aware of that rhythm abnormality.    He has a lot of back pain issues, which seemed to be the primary limiting problem to his activity.  He tells me he can only walk a short distance or do work for only a few minutes before having to stop and sit down and rest his back.  For this reason, I am not sure what his functional capacity is.  He still tries to get out in the woods and check things out, but he does so on a 4-almendarez now rather than walking.  He has no chest discomfort or shortness of breath at rest.  No PND or orthopnea.  No lightheadedness or syncope.  Overall, he seems to be doing pretty well.    His laboratory studies from August and September looked good.  His basic metabolic panel is normal with a creatinine of 1.1 and potassium of 4.8.  His LDL is 66 and HDL 39 and his ALT and hemoglobin are normal.  He remains on warfarin for stroke prevention.    His echocardiogram from 10/04/2021 looked somewhat better.  His ejection fraction previously had been 20%-25% and was reported now to be 35%.  He does have some right ventricular enlargement, but no significant valvular disease.    PHYSICAL EXAMINATION:  His blood pressure is 106/62, heart rate 70 and weight 249 pounds.  His lungs are clear.  Heart rhythm is irregular without significant murmur and he has no edema.    IMPRESSIONS:  Mr. Kevin Partida is an 86-year-old gentleman with hypertension, dyslipidemia and coronary artery disease.  He has an ischemic cardiomyopathy with an  ejection fraction, which has improved based on his most recent echocardiogram up to 35%.  He may have some mild chronic systolic heart failure, but is primarily limited by back pain issues at this time.  He also has COPD, which complicates the picture.  Fortunately, his blood pressure and cholesterol numbers remain excellent and he has normal renal function.  I have recommended Entresto and Jardiance to him in the past, but these have been too expensive.  He remains on lisinopril and carvedilol and furosemide.    He has chronic atrial fibrillation treated with rate control and anticoagulation and is currently using warfarin.  He does not seem to have any bleeding issues at this time.    I encouraged him to get a COVID vaccine.  I am not sure why he is hesitating.  I will plan to see him back again in a year if he wishes to return at that time.    cc:   Chad Gee DO   [address] [address]     Jaquan Platt MD, [facility]        D: 2021   T: 2021   MT: RODOLFO    Name:     MAHIN MCCANN  MRN:      [id number]        Account:      [id number]   :      1935           Service Date: 2021       Document: [id number]

## 2021-11-09 NOTE — PROGRESS NOTES
ANTICOAGULATION MANAGEMENT     Kevin Partida 86 year old male is on warfarin with subtherapeutic INR result. (Goal INR 2.0-3.0)    Recent labs: (last 7 days)     11/09/21  0917   INR 1.9*       ASSESSMENT     Source(s): Chart Review and Patient/Caregiver Call       Warfarin doses taken: Warfarin taken as instructed    Diet: No new diet changes identified    New illness, injury, or hospitalization: No    Medication/supplement changes: None noted    Signs or symptoms of bleeding or clotting: No    Previous INR: Therapeutic last 2(+) visits    Additional findings: None     PLAN     Recommended plan for no diet, medication or health factor changes affecting INR     Dosing Instructions: Booster dose then continue your current warfarin dose with next INR in 2 weeks       Summary  As of 11/9/2021    Full warfarin instructions:  11/9: 7.5 mg; Otherwise 5 mg every day   Next INR check:  11/23/2021             Telephone call with  Spouse who verbalizes understanding and agrees to plan    Lab visit scheduled    Education provided: None required    Plan made per [facility] anticoagulation protocol    Nuha Murray RN  Anticoagulation Clinic  11/9/2021    _______________________________________________________________________     Anticoagulation Episode Summary     Current INR goal:  2.0-3.0   TTR:  74.2 % (1 y)   Target end date:  Indefinite   Send INR reminders to:  DOMINIQUE CERVANTES    Indications    Long term current use of anticoagulant therapy [Z79.01]  Chronic atrial fibrillation (H) [I48.20]           Comments:           Anticoagulation Care Providers     Provider Role Specialty Phone number    Chad Gee DO [facility] Internal Medicine [phone]

## 2021-11-23 NOTE — PROGRESS NOTES
ANTICOAGULATION MANAGEMENT     Kevin Partida 86 year old male is on warfarin with therapeutic INR result. (Goal INR 2.0-3.0)    Recent labs: (last 7 days)     11/23/21  1012   INR 2.1*       ASSESSMENT     Source(s): Chart Review and Patient/Caregiver Call       Warfarin doses taken: Warfarin taken as instructed    Diet: No new diet changes identified    New illness, injury, or hospitalization: No    Medication/supplement changes: None noted    Signs or symptoms of bleeding or clotting: No    Previous INR: Therapeutic last 2(+) visits    Additional findings: None     PLAN     Recommended plan for no diet, medication or health factor changes affecting INR     Dosing Instructions: Continue your current warfarin dose with next INR in 6 weeks       Summary  As of 11/23/2021    Full warfarin instructions:  5 mg every day   Next INR check:  1/4/2022             Telephone call with Kevin who verbalizes understanding and agrees to plan    Lab visit scheduled    Education provided: Please call back if any changes to your diet, medications or how you've been taking warfarin    Plan made per [facility] anticoagulation protocol    Janis Green, RN  Anticoagulation Clinic  11/23/2021    _______________________________________________________________________     Anticoagulation Episode Summary     Current INR goal:  2.0-3.0   TTR:  76.1 % (1 y)   Target end date:  Indefinite   Send INR reminders to:  DOMINIQUE CERVANTES    Indications    Long term current use of anticoagulant therapy [Z79.01]  Chronic atrial fibrillation (H) [I48.20]           Comments:           Anticoagulation Care Providers     Provider Role Specialty Phone number    Chad Gee DO [facility] Internal Medicine [phone]

## 2022-01-01 ENCOUNTER — LAB (OUTPATIENT)
Dept: LAB | Facility: CLINIC | Age: 87
End: 2022-01-01
Payer: COMMERCIAL

## 2022-01-01 ENCOUNTER — ANTICOAGULATION THERAPY VISIT (OUTPATIENT)
Dept: ANTICOAGULATION | Facility: CLINIC | Age: 87
End: 2022-01-01

## 2022-01-01 ENCOUNTER — TELEPHONE (OUTPATIENT)
Dept: INTERNAL MEDICINE | Facility: CLINIC | Age: 87
End: 2022-01-01
Payer: COMMERCIAL

## 2022-01-01 ENCOUNTER — OFFICE VISIT (OUTPATIENT)
Dept: CARDIOLOGY | Facility: CLINIC | Age: 87
End: 2022-01-01
Payer: COMMERCIAL

## 2022-01-01 ENCOUNTER — HOSPITAL ENCOUNTER (EMERGENCY)
Facility: CLINIC | Age: 87
End: 2022-11-11
Attending: EMERGENCY MEDICINE | Admitting: EMERGENCY MEDICINE
Payer: MEDICARE

## 2022-01-01 ENCOUNTER — HOSPITAL ENCOUNTER (OUTPATIENT)
Dept: GENERAL RADIOLOGY | Facility: CLINIC | Age: 87
Discharge: HOME | End: 2022-11-02
Attending: INTERNAL MEDICINE | Admitting: INTERNAL MEDICINE
Payer: MEDICARE

## 2022-01-01 ENCOUNTER — OFFICE VISIT (OUTPATIENT)
Dept: INTERNAL MEDICINE | Facility: CLINIC | Age: 87
End: 2022-01-01
Payer: COMMERCIAL

## 2022-01-01 ENCOUNTER — TELEPHONE (OUTPATIENT)
Dept: ANTICOAGULATION | Facility: CLINIC | Age: 87
End: 2022-01-01

## 2022-01-01 ENCOUNTER — TELEPHONE (OUTPATIENT)
Dept: INTERNAL MEDICINE | Facility: CLINIC | Age: 87
End: 2022-01-01

## 2022-01-01 ENCOUNTER — HEALTH MAINTENANCE LETTER (OUTPATIENT)
Age: 87
End: 2022-01-01

## 2022-01-01 VITALS
SYSTOLIC BLOOD PRESSURE: 104 MMHG | HEIGHT: 72 IN | OXYGEN SATURATION: 96 % | DIASTOLIC BLOOD PRESSURE: 60 MMHG | WEIGHT: 240 LBS | BODY MASS INDEX: 32.51 KG/M2 | HEART RATE: 78 BPM

## 2022-01-01 VITALS
DIASTOLIC BLOOD PRESSURE: 74 MMHG | WEIGHT: 233.3 LBS | SYSTOLIC BLOOD PRESSURE: 116 MMHG | RESPIRATION RATE: 20 BRPM | OXYGEN SATURATION: 88 % | HEART RATE: 76 BPM | TEMPERATURE: 98.2 F | HEIGHT: 73 IN | BODY MASS INDEX: 30.92 KG/M2

## 2022-01-01 DIAGNOSIS — N40.1 BENIGN PROSTATIC HYPERPLASIA WITH URINARY RETENTION: ICD-10-CM

## 2022-01-01 DIAGNOSIS — I48.20 CHRONIC ATRIAL FIBRILLATION (H): ICD-10-CM

## 2022-01-01 DIAGNOSIS — I25.10 CARDIOVASCULAR DISEASE: ICD-10-CM

## 2022-01-01 DIAGNOSIS — Z79.01 LONG TERM CURRENT USE OF ANTICOAGULANT THERAPY: ICD-10-CM

## 2022-01-01 DIAGNOSIS — R33.8 BENIGN PROSTATIC HYPERPLASIA WITH URINARY RETENTION: ICD-10-CM

## 2022-01-01 DIAGNOSIS — I25.5 ISCHEMIC CARDIOMYOPATHY: ICD-10-CM

## 2022-01-01 DIAGNOSIS — I50.22 CHRONIC SYSTOLIC HEART FAILURE (H): ICD-10-CM

## 2022-01-01 DIAGNOSIS — E78.5 HYPERLIPIDEMIA LDL GOAL <70: ICD-10-CM

## 2022-01-01 DIAGNOSIS — J43.9 PULMONARY EMPHYSEMA, UNSPECIFIED EMPHYSEMA TYPE (H): ICD-10-CM

## 2022-01-01 DIAGNOSIS — Z79.01 LONG TERM CURRENT USE OF ANTICOAGULANT THERAPY: Primary | ICD-10-CM

## 2022-01-01 DIAGNOSIS — I10 HYPERTENSION GOAL BP (BLOOD PRESSURE) < 140/90: ICD-10-CM

## 2022-01-01 DIAGNOSIS — I25.10 CORONARY ARTERY DISEASE INVOLVING NATIVE CORONARY ARTERY WITHOUT ANGINA PECTORIS: ICD-10-CM

## 2022-01-01 DIAGNOSIS — I25.10 CORONARY ARTERY DISEASE INVOLVING NATIVE CORONARY ARTERY OF NATIVE HEART WITHOUT ANGINA PECTORIS: ICD-10-CM

## 2022-01-01 DIAGNOSIS — R06.09 DYSPNEA ON EXERTION: ICD-10-CM

## 2022-01-01 DIAGNOSIS — I25.5 ISCHEMIC CARDIOMYOPATHY: Primary | ICD-10-CM

## 2022-01-01 DIAGNOSIS — I46.9 CARDIAC ARREST (H): ICD-10-CM

## 2022-01-01 DIAGNOSIS — Z00.00 MEDICARE ANNUAL WELLNESS VISIT, SUBSEQUENT: Primary | ICD-10-CM

## 2022-01-01 LAB
ALBUMIN UR-MCNC: NEGATIVE MG/DL
ALT SERPL W P-5'-P-CCNC: 18 U/L (ref 0–70)
ALT SERPL W P-5'-P-CCNC: 21 U/L (ref 0–70)
ANION GAP SERPL CALCULATED.3IONS-SCNC: 3 MMOL/L (ref 3–14)
ANION GAP SERPL CALCULATED.3IONS-SCNC: 4 MMOL/L (ref 3–14)
ANION GAP SERPL CALCULATED.3IONS-SCNC: 5 MMOL/L (ref 3–14)
APPEARANCE UR: CLEAR
BACTERIA #/AREA URNS HPF: ABNORMAL /HPF
BACTERIA UR CULT: NORMAL
BILIRUB UR QL STRIP: NEGATIVE
BUN SERPL-MCNC: 23 MG/DL (ref 7–30)
BUN SERPL-MCNC: 25 MG/DL (ref 7–30)
BUN SERPL-MCNC: 27 MG/DL (ref 7–30)
CALCIUM SERPL-MCNC: 8.4 MG/DL (ref 8.5–10.1)
CALCIUM SERPL-MCNC: 8.9 MG/DL (ref 8.5–10.1)
CALCIUM SERPL-MCNC: 9.1 MG/DL (ref 8.5–10.1)
CHLORIDE BLD-SCNC: 104 MMOL/L (ref 94–109)
CHLORIDE BLD-SCNC: 108 MMOL/L (ref 94–109)
CHLORIDE BLD-SCNC: 109 MMOL/L (ref 94–109)
CHOLEST SERPL-MCNC: 133 MG/DL
CHOLEST SERPL-MCNC: 135 MG/DL
CO2 SERPL-SCNC: 29 MMOL/L (ref 20–32)
CO2 SERPL-SCNC: 30 MMOL/L (ref 20–32)
CO2 SERPL-SCNC: 33 MMOL/L (ref 20–32)
COLOR UR AUTO: YELLOW
CREAT SERPL-MCNC: 1.06 MG/DL (ref 0.66–1.25)
CREAT SERPL-MCNC: 1.08 MG/DL (ref 0.66–1.25)
CREAT SERPL-MCNC: 1.21 MG/DL (ref 0.66–1.25)
FASTING STATUS PATIENT QL REPORTED: YES
FASTING STATUS PATIENT QL REPORTED: YES
GFR SERPL CREATININE-BSD FRML MDRD: 58 ML/MIN/1.73M2
GFR SERPL CREATININE-BSD FRML MDRD: 66 ML/MIN/1.73M2
GFR SERPL CREATININE-BSD FRML MDRD: 68 ML/MIN/1.73M2
GLUCOSE BLD-MCNC: 122 MG/DL (ref 70–99)
GLUCOSE BLD-MCNC: 125 MG/DL (ref 70–99)
GLUCOSE BLD-MCNC: 130 MG/DL (ref 70–99)
GLUCOSE UR STRIP-MCNC: NEGATIVE MG/DL
HDLC SERPL-MCNC: 37 MG/DL
HDLC SERPL-MCNC: 42 MG/DL
HGB UR QL STRIP: ABNORMAL
HYALINE CASTS: 17 /LPF
INR BLD: 1.5 (ref 0.9–1.1)
INR BLD: 2 (ref 0.9–1.1)
INR BLD: 2.1 (ref 0.9–1.1)
INR BLD: 2.3 (ref 0.9–1.1)
INR BLD: 2.5 (ref 0.9–1.1)
INR BLD: 2.6 (ref 0.9–1.1)
INR BLD: 2.8 (ref 0.9–1.1)
INR BLD: 3.1 (ref 0.9–1.1)
INR PPP: 2.13 (ref 0.85–1.15)
KETONES UR STRIP-MCNC: NEGATIVE MG/DL
LDLC SERPL CALC-MCNC: 59 MG/DL
LDLC SERPL CALC-MCNC: 61 MG/DL
LEUKOCYTE ESTERASE UR QL STRIP: ABNORMAL
MUCOUS THREADS #/AREA URNS LPF: PRESENT /LPF
NITRATE UR QL: NEGATIVE
NONHDLC SERPL-MCNC: 93 MG/DL
NONHDLC SERPL-MCNC: 96 MG/DL
PH UR STRIP: 6 [PH] (ref 5–7)
POTASSIUM BLD-SCNC: 4.2 MMOL/L (ref 3.4–5.3)
POTASSIUM BLD-SCNC: 4.4 MMOL/L (ref 3.4–5.3)
POTASSIUM BLD-SCNC: 4.7 MMOL/L (ref 3.4–5.3)
RBC URINE: 1 /HPF
SODIUM SERPL-SCNC: 140 MMOL/L (ref 133–144)
SODIUM SERPL-SCNC: 142 MMOL/L (ref 133–144)
SODIUM SERPL-SCNC: 143 MMOL/L (ref 133–144)
SP GR UR STRIP: 1.01 (ref 1–1.03)
SQUAMOUS EPITHELIAL: 1 /HPF
TRIGL SERPL-MCNC: 161 MG/DL
TRIGL SERPL-MCNC: 185 MG/DL
UROBILINOGEN UR STRIP-MCNC: NORMAL MG/DL
WBC URINE: 10 /HPF

## 2022-01-01 PROCEDURE — 80048 BASIC METABOLIC PNL TOTAL CA: CPT | Performed by: INTERNAL MEDICINE

## 2022-01-01 PROCEDURE — 36416 COLLJ CAPILLARY BLOOD SPEC: CPT

## 2022-01-01 PROCEDURE — 80061 LIPID PANEL: CPT | Performed by: INTERNAL MEDICINE

## 2022-01-01 PROCEDURE — 85610 PROTHROMBIN TIME: CPT

## 2022-01-01 PROCEDURE — G0439 PPPS, SUBSEQ VISIT: HCPCS | Performed by: INTERNAL MEDICINE

## 2022-01-01 PROCEDURE — 99214 OFFICE O/P EST MOD 30 MIN: CPT | Performed by: INTERNAL MEDICINE

## 2022-01-01 PROCEDURE — 71046 X-RAY EXAM CHEST 2 VIEWS: CPT

## 2022-01-01 PROCEDURE — 99214 OFFICE O/P EST MOD 30 MIN: CPT | Mod: 25 | Performed by: INTERNAL MEDICINE

## 2022-01-01 PROCEDURE — 36415 COLL VENOUS BLD VENIPUNCTURE: CPT

## 2022-01-01 PROCEDURE — 84460 ALANINE AMINO (ALT) (SGPT): CPT | Performed by: INTERNAL MEDICINE

## 2022-01-01 PROCEDURE — 99282 EMERGENCY DEPT VISIT SF MDM: CPT | Performed by: EMERGENCY MEDICINE

## 2022-01-01 PROCEDURE — 87086 URINE CULTURE/COLONY COUNT: CPT | Performed by: INTERNAL MEDICINE

## 2022-01-01 PROCEDURE — 99285 EMERGENCY DEPT VISIT HI MDM: CPT | Performed by: EMERGENCY MEDICINE

## 2022-01-01 PROCEDURE — 81001 URINALYSIS AUTO W/SCOPE: CPT | Performed by: INTERNAL MEDICINE

## 2022-01-01 PROCEDURE — 85610 PROTHROMBIN TIME: CPT | Performed by: INTERNAL MEDICINE

## 2022-01-01 PROCEDURE — 36415 COLL VENOUS BLD VENIPUNCTURE: CPT | Performed by: INTERNAL MEDICINE

## 2022-01-01 RX ORDER — FINASTERIDE 5 MG/1
TABLET, FILM COATED ORAL
Qty: 90 TABLET | Refills: 0 | Status: SHIPPED | OUTPATIENT
Start: 2022-01-01 | End: 2022-01-01

## 2022-01-01 RX ORDER — FUROSEMIDE 20 MG
20 TABLET ORAL DAILY
Qty: 90 TABLET | Refills: 2 | Status: SHIPPED | OUTPATIENT
Start: 2022-01-01

## 2022-01-01 RX ORDER — WARFARIN SODIUM 5 MG/1
TABLET ORAL
Qty: 90 TABLET | Refills: 1 | Status: SHIPPED | OUTPATIENT
Start: 2022-01-01

## 2022-01-01 RX ORDER — LISINOPRIL 10 MG/1
10 TABLET ORAL DAILY
Qty: 90 TABLET | Refills: 2 | OUTPATIENT
Start: 2022-01-01

## 2022-01-01 RX ORDER — PREDNISONE 20 MG/1
10 TABLET ORAL DAILY
Qty: 30 TABLET | Refills: 0 | Status: SHIPPED | OUTPATIENT
Start: 2022-01-01

## 2022-01-01 RX ORDER — IPRATROPIUM BROMIDE AND ALBUTEROL SULFATE 2.5; .5 MG/3ML; MG/3ML
1 SOLUTION RESPIRATORY (INHALATION) EVERY 6 HOURS PRN
Qty: 90 ML | Refills: 1 | Status: SHIPPED | OUTPATIENT
Start: 2022-01-01

## 2022-01-01 RX ORDER — LISINOPRIL 10 MG/1
10 TABLET ORAL DAILY
Qty: 90 TABLET | Refills: 2 | Status: SHIPPED | OUTPATIENT
Start: 2022-01-01

## 2022-01-01 RX ORDER — CARVEDILOL 12.5 MG/1
12.5 TABLET ORAL 2 TIMES DAILY WITH MEALS
Qty: 180 TABLET | Refills: 0 | Status: SHIPPED | OUTPATIENT
Start: 2022-01-01 | End: 2022-01-01

## 2022-01-01 RX ORDER — FINASTERIDE 5 MG/1
TABLET, FILM COATED ORAL
Qty: 90 TABLET | Refills: 0 | Status: SHIPPED | OUTPATIENT
Start: 2022-01-01

## 2022-01-01 RX ORDER — CARVEDILOL 12.5 MG/1
12.5 TABLET ORAL 2 TIMES DAILY WITH MEALS
Qty: 180 TABLET | Refills: 2 | Status: SHIPPED | OUTPATIENT
Start: 2022-01-01

## 2022-01-01 RX ORDER — SIMVASTATIN 80 MG
80 TABLET ORAL DAILY
Qty: 90 TABLET | Refills: 2 | Status: SHIPPED | OUTPATIENT
Start: 2022-01-01

## 2022-01-01 RX ORDER — SIMVASTATIN 80 MG
80 TABLET ORAL DAILY
Qty: 90 TABLET | Refills: 2 | Status: SHIPPED | OUTPATIENT
Start: 2022-01-01 | End: 2022-01-01

## 2022-01-01 RX ORDER — LISINOPRIL 10 MG/1
10 TABLET ORAL DAILY
Qty: 90 TABLET | Refills: 1 | Status: SHIPPED | OUTPATIENT
Start: 2022-01-01 | End: 2022-01-01

## 2022-01-01 RX ORDER — FUROSEMIDE 20 MG
20 TABLET ORAL DAILY
Qty: 90 TABLET | Refills: 1 | Status: SHIPPED | OUTPATIENT
Start: 2022-01-01 | End: 2022-01-01

## 2022-01-01 ASSESSMENT — ENCOUNTER SYMPTOMS
ABDOMINAL PAIN: 0
PALPITATIONS: 0
DYSURIA: 0
NERVOUS/ANXIOUS: 0
ARTHRALGIAS: 1
HEADACHES: 0
FEVER: 0
HEMATURIA: 0
CONSTIPATION: 0
COUGH: 1
FREQUENCY: 0
CHILLS: 0
EYE PAIN: 0
WEAKNESS: 1
JOINT SWELLING: 0
DIARRHEA: 0
SORE THROAT: 0
NAUSEA: 0
HEARTBURN: 0
HEMATOCHEZIA: 0
SHORTNESS OF BREATH: 1
PARESTHESIAS: 0
MYALGIAS: 0
DIZZINESS: 0

## 2022-01-01 ASSESSMENT — PAIN SCALES - GENERAL: PAINLEVEL: NO PAIN (0)

## 2022-01-01 ASSESSMENT — PATIENT HEALTH QUESTIONNAIRE - PHQ9
SUM OF ALL RESPONSES TO PHQ QUESTIONS 1-9: 5
SUM OF ALL RESPONSES TO PHQ QUESTIONS 1-9: 5
10. IF YOU CHECKED OFF ANY PROBLEMS, HOW DIFFICULT HAVE THESE PROBLEMS MADE IT FOR YOU TO DO YOUR WORK, TAKE CARE OF THINGS AT HOME, OR GET ALONG WITH OTHER PEOPLE: VERY DIFFICULT

## 2022-01-01 ASSESSMENT — ACTIVITIES OF DAILY LIVING (ADL): CURRENT_FUNCTION: NO ASSISTANCE NEEDED

## 2022-01-04 NOTE — PROGRESS NOTES
Anticoagulation Management    Unable to reach pt today.    Today's INR result of 3.1 is supratherapeutic (goal INR of 2.0-3.0).  Result received from: Clinic Lab    Follow up required to confirm warfarin dose taken and assess for changes    VM left to call [facility] back. Will need to try again later.       Anticoagulation clinic to follow up    Janis Green RN

## 2022-01-04 NOTE — PROGRESS NOTES
ANTICOAGULATION MANAGEMENT     Kevin Partida 86 year old male is on warfarin with supratherapeutic INR result. (Goal INR 2.0-3.0)    Recent labs: (last 7 days)     01/04/22  0952   INR 3.1*       ASSESSMENT     Source(s): Chart Review       Warfarin doses taken: Warfarin taken as instructed    Diet: No new diet changes identified    New illness, injury, or hospitalization: No    Medication/supplement changes: None noted    Signs or symptoms of bleeding or clotting: No    Previous INR: Therapeutic last visit; previously outside of goal range    Additional findings: None     PLAN     Recommended plan for no diet, medication or health factor changes affecting INR     Dosing Instructions: Continue your current warfarin dose with next INR in 2 weeks       Summary  As of 1/4/2022    Full warfarin instructions:  5 mg every day   Next INR check:  1/18/2022             Telephone call with Kevin who verbalizes understanding and agrees to plan    Lab visit scheduled    Education provided: Please call back if any changes to your diet, medications or how you've been taking warfarin and Monitoring for bleeding signs and symptoms    Plan made per [facility] anticoagulation protocol    Janis Green, RN  Anticoagulation Clinic  1/4/2022    _______________________________________________________________________     Anticoagulation Episode Summary     Current INR goal:  2.0-3.0   TTR:  83.6 % (1 y)   Target end date:  Indefinite   Send INR reminders to:  DOMINIQUE CERVANTES    Indications    Long term current use of anticoagulant therapy [Z79.01]  Chronic atrial fibrillation (H) [I48.20]           Comments:           Anticoagulation Care Providers     Provider Role Specialty Phone number    Chad Gee DO [facility] Internal Medicine [phone]

## 2022-01-18 NOTE — PROGRESS NOTES
ANTICOAGULATION MANAGEMENT     Kevin Partida 86 year old male is on warfarin with therapeutic INR result. (Goal INR 2.0-3.0)    Recent labs: (last 7 days)     01/18/22  0942   INR 2.5*       ASSESSMENT     Source(s): Chart Review and Patient/Caregiver Call       Warfarin doses taken: Warfarin taken as instructed    Diet: No new diet changes identified    New illness, injury, or hospitalization: No    Medication/supplement changes: None noted    Signs or symptoms of bleeding or clotting: No    Previous INR: Supratherapeutic    Additional findings: None     PLAN     Recommended plan for no diet, medication or health factor changes affecting INR     Dosing Instructions: Continue your current warfarin dose with next INR in 4 weeks       Summary  As of 1/18/2022    Full warfarin instructions:  5 mg every day   Next INR check:  2/15/2022             Telephone call with Kevin who verbalizes understanding and agrees to plan    Lab visit scheduled    Education provided: Please call back if any changes to your diet, medications or how you've been taking warfarin    Plan made per [facility] anticoagulation protocol    Janis Green, RN  Anticoagulation Clinic  1/18/2022    _______________________________________________________________________     Anticoagulation Episode Summary     Current INR goal:  2.0-3.0   TTR:  86.4 % (1 y)   Target end date:  Indefinite   Send INR reminders to:  DOMINIQUE CERVANTES    Indications    Long term current use of anticoagulant therapy [Z79.01]  Chronic atrial fibrillation (H) [I48.20]           Comments:           Anticoagulation Care Providers     Provider Role Specialty Phone number    Chad Gee DO [facility] Internal Medicine [phone]

## 2022-02-15 NOTE — PROGRESS NOTES
ANTICOAGULATION MANAGEMENT     Kevin Partida 86 year old male is on warfarin with therapeutic INR result. (Goal INR 2.0-3.0)    Recent labs: (last 7 days)     02/15/22  0943   INR 2.3*       ASSESSMENT     Source(s): Chart Review and Patient/Caregiver Call       Warfarin doses taken: Warfarin taken as instructed    Diet: No new diet changes identified    New illness, injury, or hospitalization: No    Medication/supplement changes: None noted    Signs or symptoms of bleeding or clotting: No    Previous INR: Therapeutic last visit; previously outside of goal range    Additional findings: None     PLAN     Recommended plan for no diet, medication or health factor changes affecting INR     Dosing Instructions: Continue your current warfarin dose with next INR in 5 weeks       Summary  As of 2/15/2022    Full warfarin instructions:  5 mg every day   Next INR check:  3/15/2022             Telephone call with Kevin who verbalizes understanding and agrees to plan    Lab visit scheduled    Education provided: None required    Plan made per [facility] anticoagulation protocol    Nuha Murray RN  Anticoagulation Clinic  2/15/2022    _______________________________________________________________________     Anticoagulation Episode Summary     Current INR goal:  2.0-3.0   TTR:  89.9 % (1 y)   Target end date:  Indefinite   Send INR reminders to:  DOMINIQUE CERVANTES    Indications    Long term current use of anticoagulant therapy [Z79.01]  Chronic atrial fibrillation (H) [I48.20]           Comments:           Anticoagulation Care Providers     Provider Role Specialty Phone number    Chad Gee DO [facility] Internal Medicine [phone]

## 2022-03-22 NOTE — PROGRESS NOTES
ANTICOAGULATION MANAGEMENT     Kevin Partida 87 year old male is on warfarin with therapeutic INR result. (Goal INR 2.0-3.0)    Recent labs: (last 7 days)     03/22/22  0947   INR 2.1*       ASSESSMENT       Source(s): Chart Review    Previous INR was Therapeutic last 2(+) visits    Medication, diet, health changes since last INR chart reviewed; none identified           PLAN     Recommended plan for no diet, medication or health factor changes affecting INR     Dosing Instructions: Continue your current warfarin dose with next INR in 6 weeks       Summary  As of 3/22/2022    Full warfarin instructions:  5 mg every day   Next INR check:  5/3/2022             Detailed voice message left for Kevin with dosing instructions and follow up date.     Contact [phone]  to schedule and with any changes, questions or concerns.     Education provided: Contact [phone]  with any changes, questions or concerns.     Plan made per [facility] anticoagulation protocol    Janis Green RN  Anticoagulation Clinic  3/22/2022    _______________________________________________________________________     Anticoagulation Episode Summary     Current INR goal:  2.0-3.0   TTR:  89.9 % (1 y)   Target end date:  Indefinite   Send INR reminders to:  DOMINIQUE CERVANTES    Indications    Long term current use of anticoagulant therapy [Z79.01]  Chronic atrial fibrillation (H) [I48.20]           Comments:           Anticoagulation Care Providers     Provider Role Specialty Phone number    Chad Gee DO [facility] Internal Medicine [phone]

## 2022-05-03 NOTE — PROGRESS NOTES
ANTICOAGULATION MANAGEMENT     Kevin Partida 87 year old male is on warfarin with therapeutic INR result. (Goal INR 2.0-3.0)    Recent labs: (last 7 days)     05/03/22  0950   INR 2.0*       ASSESSMENT       Source(s): Chart Review and Patient/Caregiver Call       Warfarin doses taken: Warfarin taken as instructed    Diet: No new diet changes identified    New illness, injury, or hospitalization: No    Medication/supplement changes: None noted    Signs or symptoms of bleeding or clotting: No    Previous INR: Therapeutic last 2(+) visits    Additional findings: None       PLAN     Recommended plan for no diet, medication or health factor changes affecting INR     Dosing Instructions: continue your current warfarin dose with next INR in 6 weeks       Summary  As of 5/3/2022    Full warfarin instructions:  5 mg every day   Next INR check:  6/17/2022             Telephone call with Moshe who verbalizes understanding and agrees to plan    Lab visit scheduled    Education provided: Please call back if any changes to your diet, medications or how you've been taking warfarin    Plan made per [facility] anticoagulation protocol    Janis Green, RN  Anticoagulation Clinic  5/3/2022    _______________________________________________________________________     Anticoagulation Episode Summary     Current INR goal:  2.0-3.0   TTR:  89.9 % (1 y)   Target end date:  Indefinite   Send INR reminders to:  DOMINIQUE CERVANTES    Indications    Long term current use of anticoagulant therapy [Z79.01]  Chronic atrial fibrillation (H) [I48.20]           Comments:           Anticoagulation Care Providers     Provider Role Specialty Phone number    Chad Gee DO [facility] Internal Medicine [phone]

## 2022-05-03 NOTE — PROGRESS NOTES
ANTICOAGULATION MANAGEMENT     Kevin Partida 87 year old male is on warfarin with therapeutic INR result. (Goal INR 2.0-3.0)    Recent labs: (last 7 days)     05/03/22  0950   INR 2.0*       ASSESSMENT       Source(s): Chart Review    Previous INR was Therapeutic last 2(+) visits    Medication, diet, health changes since last INR chart reviewed; none identified           PLAN     Unable to reach pt today.    VM left to call [facility] back. Will try again later.     Follow up required to confirm warfarin dose taken and assess for changes    Janis Green, RN  Anticoagulation Clinic  5/3/2022

## 2022-06-14 NOTE — PROGRESS NOTES
ANTICOAGULATION MANAGEMENT     Kevin Partida 87 year old male is on warfarin with subtherapeutic INR result. (Goal INR 2.0-3.0)    Recent labs: (last 7 days)     06/14/22  0944   INR 1.5*       ASSESSMENT       Source(s): Chart Review    Previous INR was Therapeutic last 2(+) visits    Medication, diet, health changes since last INR chart reviewed; none identified           PLAN     Unable to reach pt today.    VM left to call [facility] back. Will try again later.     Follow up required to confirm warfarin dose taken and assess for changes    Janis Green, RN  Anticoagulation Clinic  6/14/2022

## 2022-06-14 NOTE — PROGRESS NOTES
ANTICOAGULATION MANAGEMENT     Kevin Partida 87 year old male is on warfarin with subtherapeutic INR result. (Goal INR 2.0-3.0)    Recent labs: (last 7 days)     06/14/22  0944   INR 1.5*       ASSESSMENT       Source(s): Chart Review and Patient/Caregiver Call - spouse      Warfarin doses taken: Missed dose(s) may be affecting INR    Diet: No new diet changes identified    New illness, injury, or hospitalization: No    Medication/supplement changes: None noted    Signs or symptoms of bleeding or clotting: No    Previous INR: Therapeutic last 2(+) visits    Additional findings: None       PLAN     Recommended plan for temporary change(s) affecting INR     Dosing Instructions: booster dose then continue your current warfarin dose with next INR in 2 weeks       Summary  As of 6/14/2022    Full warfarin instructions:  6/14: 7.5 mg; Otherwise 5 mg every day   Next INR check:  6/21/2022             Telephone call with  spouse  who verbalizes understanding and agrees to plan    Lab visit scheduled    Education provided: Please call back if any changes to your diet, medications or how you've been taking warfarin, Goal range and significance of current result and Monitoring for clotting signs and symptoms    Plan made per [facility] anticoagulation protocol    Janis Green RN  Anticoagulation Clinic  6/14/2022    _______________________________________________________________________     Anticoagulation Episode Summary     Current INR goal:  2.0-3.0   TTR:  78.4 % (1 y)   Target end date:  Indefinite   Send INR reminders to:  [facility]    Indications    Long term current use of anticoagulant therapy [Z79.01]  Chronic atrial fibrillation (H) [I48.20]           Comments:           Anticoagulation Care Providers     Provider Role Specialty Phone number    Chad Gee DO [facility] Internal Medicine [phone]

## 2022-06-21 NOTE — LETTER
6/21/2022    Chad Gee, DO  [address] [place] [address]    RE: Kevin Partida       Dear Colleague,     I had the pleasure of seeing [facility] in the [facility].  HPI and Plan:   See dictation    Today's clinic visit entailed:  Review of the result(s) of each unique test - bmp, flp. alt, echo  Ordering of each unique test  Prescription drug management  30 minutes spent on the date of the encounter doing chart review, review of test results, patient visit and documentation   Provider  Link to [facility] Help Grid     The level of medical decision making during this visit was of high complexity.      Orders Placed This Encounter   Procedures     Basic metabolic panel     Lipid Profile     ALT     Follow-Up with Cardiology     Echocardiogram Complete       Orders Placed This Encounter   Medications     ipratropium-albuterol (COMBIVENT RESPIMAT)  MCG/ACT inhaler       There are no discontinued medications.      Encounter Diagnoses   Name Primary?     Chronic systolic heart failure (H)      Ischemic cardiomyopathy Yes     Coronary artery disease involving native coronary artery of native heart without angina pectoris      Hypertension goal BP (blood pressure) < 140/90      Hyperlipidemia LDL goal <70      Pulmonary emphysema, unspecified emphysema type (H)      Chronic atrial fibrillation (H)      Dyspnea on exertion        CURRENT MEDICATIONS:  Current Outpatient Medications   Medication Sig Dispense Refill     aspirin 81 MG tablet Take 1 tablet by mouth daily.       carvedilol (COREG) 12.5 MG tablet Take 1 tablet (12.5 mg) by mouth 2 times daily (with meals) 180 tablet 2     finasteride (PROSCAR) 5 MG tablet TAKE ONE TABLET BY MOUTH ONCE DAILY 90 tablet 0     furosemide (LASIX) 20 MG tablet Take 1 tablet (20 mg) by mouth daily 90 tablet 1     ipratropium-albuterol (COMBIVENT RESPIMAT)  MCG/ACT inhaler        lisinopril (ZESTRIL) 10 MG tablet Take 1 tablet (10  mg) by mouth daily 90 tablet 1     simvastatin (ZOCOR) 80 MG tablet Take 1 tablet (80 mg) by mouth daily 90 tablet 2     warfarin ANTICOAGULANT (JANTOVEN ANTICOAGULANT) 5 MG tablet Take 5 mg daily, or as directed by the [facility]mad[facility]. 90 tablet 1       ALLERGIES     Allergies   Allergen Reactions     Codeine GI Disturbance     Niacin Hives     got very red and flushed.  Doesn't want       PAST MEDICAL HISTORY:  Past Medical History:   Diagnosis Date     Atrial fibrillation (H) 04/14/07    Admit. Discharged 04/15/07     Atrial flutter (H)      Coronary artery disease      Headache(784.0)      Hepatitis, unspecified      Hyperlipidaemia      Hypertension      Measles without mention of complication     Measles     Mumps without mention of complication     Mumps     Orchitis and epididymitis, unspecified      Other and unspecified hyperlipidemia      Other emphysema (H)      Other speech disturbance     Stuttering     Pneumonia, organism unspecified(486)     Pneumonia     Shortness of breath      Urinary obstruction      Varicella without mention of complication     Chickenpox       PAST SURGICAL HISTORY:  Past Surgical History:   Procedure Laterality Date     COLONOSCOPY  9/13/2011    Procedure:COLONOSCOPY; colonoscopy; Surgeon:IVETH WIGGINS; Location:[facility]     [facility] LAPAROSCOPY, SURGICAL; CHOLECYSTECTOMY  1998    Cholecystectomy, Laparoscopic     HC REMOVAL OF TONSILS,<13 Y/O      Tonsils <12y.o.     LAMINECT/DISCECTOMY, LUMBAR  03/26/08    Right L4-L5 microlumbar diskectomy.     [facility]  [facility]       FAMILY HISTORY:  Family History   Problem Relation Age of Onset     Alzheimer Disease Mother         ?dimentia or alzheimers     Diabetes Mother         insulin     EYE* Mother         cataract     Cerebrovascular Disease Mother      Cancer Father         lymph tumor of glands     Diabetes Maternal Aunt         ? oral or insulin     Diabetes Other         4 cousins insulin dependent     Genetic  Disorder Maternal Grandmother         tremors     Heart Disease Paternal Uncle         ? at age 60-70     Neurologic Disorder Paternal Uncle         parkinsons     Cerebrovascular Disease Paternal Uncle         ? 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     Employer: [employer]   Tobacco Use     Smoking status: Former Smoker     Packs/day: 0.50     Types: Cigarettes     Smokeless tobacco: Never Used     Tobacco comment: smoked for 57 years- since age 9   Substance and Sexual Activity     Alcohol use: Yes     Alcohol/week: 0.0 standard drinks     Comment: socially     Drug use: No     Sexual activity: Not Currently     Partners: Female   Other Topics Concern      Service Yes     Comment: [employer] x 8 yrs     Blood Transfusions No     Caffeine Concern No     Comment: 6 cups coffee/day     Occupational Exposure No     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 Yes       Review of Systems:  Skin:  Negative       Eyes:  Positive for glasses    ENT:  Positive for hearing loss    Respiratory:  Positive for dyspnea on exertion COPD   Cardiovascular:  Negative for;palpitations;chest pain;edema;lightheadedness;dizziness      Gastroenterology: Negative      Genitourinary:  Negative      Musculoskeletal:  Positive for muscular weakness    Neurologic:  Positive for incoordination balance  issues  Psychiatric:  Negative      Heme/Lymph/Imm:  Positive for allergies    Endocrine:  Negative        Physical Exam:  Vitals: /60 (BP Location: Left arm, Patient Position: Sitting, Cuff Size: Adult Large)   Pulse 78   Ht 1.829 m (6')   Wt 108.9 kg (240 lb)   SpO2 96%   BMI 32.55 kg/m      Constitutional:           Skin:             Head:           Eyes:           Lymph:      ENT:           Neck:           Respiratory:            Cardiac:                                                           GI:           Extremities and Muscular Skeletal:                 Neurological:           Psych:           CC  Jaquan Platt MD  [address]  [place] [address]                Service Date: 06/21/2022    HISTORY OF PRESENT ILLNESS:  I had the opportunity to see Mr. Kevin Partida in Cardiology Clinic today at [facility] in [place] for reevaluation of chronic ischemic cardiomyopathy and chronic systolic heart failure.      He has had severe left ventricular dysfunction with an ejection fraction of 20%-25% for a number of years, although more recently, the ejection fraction was reported to be slightly higher at 35%.  He also has COPD and chronic severe low back pain.  His COPD and back pain issues are significantly limiting to him, probably more than his heart disease issues.  He also has chronic atrial fibrillation treated with rate control and anticoagulation and is currently using warfarin.    He comes in today without any specific complaints.  He still likes to get outside and work in the yard and in the woods, but it sounds like his activity level has been decreasing due to his back pain issues.    He denies any chest pain, lightheadedness, palpitations and syncope.  His shortness of breath seems to be stable.  I have encouraged him to undergo implantation of a defibrillator in the past for primary prevention of sudden cardiac death, but he has consistently refused.  He has  also refused the COVID vaccination.  I once again encouraged him strongly to get the COVID vaccine, but he is quite adamant that he will not.    PHYSICAL EXAM:  Today, his blood pressure is 104/60, heart rate 78 and weight 240 pounds.  He is down about 9 pounds.  His lungs are clear.  Heart rhythm is irregularly irregular without significant murmur.      His last basic metabolic panel looked quite normal with a creatinine of 1.06.  His cholesterol numbers are excellent with an LDL of 61 and his ALT is normal.    IMPRESSIONS:  Mr. Kevin Partida is an 70-year-old gentleman with chronic systolic heart failure due to ischemic cardiomyopathy with an ejection fraction which has been 20%-25%, although most recently was up to 35%.  He has chronic atrial fibrillation on warfarin, which is well rate controlled and good blood pressure and cholesterol control.    I think he is doing well from a cardiac standpoint.  I will not make any changes.  His wife wondered why he has to be seen every 6 months and I said that he is doing well enough now that we could see him again in a year if he likes.  We will go ahead and schedule out for next year.    cc:   DO FREDDIE Fierro [address] [address]     Jai Epps MD, [facility]        D: 2022   T: 2022   MT: RODOLFO    Name:     KEVIN PARTIDA  MRN:      [id number]        Account:      [id number]   :      1935           Service Date: 2022       Document: [id number]      Thank you for allowing me to participate in the care of your patient.      Sincerely,     JAI EPPS MD [facility]  cc:   Jai Epps MD  [address] [address]

## 2022-06-21 NOTE — PROGRESS NOTES
Service Date: 06/21/2022    HISTORY OF PRESENT ILLNESS:  I had the opportunity to see Mr. Kevin Partida in Cardiology Clinic today at [facility] in [place] for reevaluation of chronic ischemic cardiomyopathy and chronic systolic heart failure.      He has had severe left ventricular dysfunction with an ejection fraction of 20%-25% for a number of years, although more recently, the ejection fraction was reported to be slightly higher at 35%.  He also has COPD and chronic severe low back pain.  His COPD and back pain issues are significantly limiting to him, probably more than his heart disease issues.  He also has chronic atrial fibrillation treated with rate control and anticoagulation and is currently using warfarin.    He comes in today without any specific complaints.  He still likes to get outside and work in the yard and in the woods, but it sounds like his activity level has been decreasing due to his back pain issues.    He denies any chest pain, lightheadedness, palpitations and syncope.  His shortness of breath seems to be stable.  I have encouraged him to undergo implantation of a defibrillator in the past for primary prevention of sudden cardiac death, but he has consistently refused.  He has also refused the COVID vaccination.  I once again encouraged him strongly to get the COVID vaccine, but he is quite adamant that he will not.    PHYSICAL EXAM:  Today, his blood pressure is 104/60, heart rate 78 and weight 240 pounds.  He is down about 9 pounds.  His lungs are clear.  Heart rhythm is irregularly irregular without significant murmur.      His last basic metabolic panel looked quite normal with a creatinine of 1.06.  His cholesterol numbers are excellent with an LDL of 61 and his ALT is normal.    IMPRESSIONS:  Mr. Kevin Partida is an 70-year-old gentleman with chronic systolic heart failure due to ischemic cardiomyopathy with an ejection fraction which has been 20%-25%, although  most recently was up to 35%.  He has chronic atrial fibrillation on warfarin, which is well rate controlled and good blood pressure and cholesterol control.    I think he is doing well from a cardiac standpoint.  I will not make any changes.  His wife wondered why he has to be seen every 6 months and I said that he is doing well enough now that we could see him again in a year if he likes.  We will go ahead and schedule out for next year.    cc:   [place] [address]     Jaquan Platt MD, [facility]        D: 2022   T: 2022   MT: RODOLFO    Name:     MAHIN MCCANN  MRN:      [id number]        Account:      [id number]   :      1935           Service Date: 2022       Document: [id number]

## 2022-06-21 NOTE — PROGRESS NOTES
ANTICOAGULATION MANAGEMENT     Kevin Partida 87 year old male is on warfarin with therapeutic INR result. (Goal INR 2.0-3.0)    Recent labs: (last 7 days)     06/21/22  0918   INR 2.5*       ASSESSMENT       Source(s): Chart Review       Warfarin doses taken: Reviewed in chart    Diet: LM    New illness, injury, or hospitalization: No    Medication/supplement changes: None noted    Signs or symptoms of bleeding or clotting: No    Previous INR: Subtherapeutic    Additional findings: None       PLAN     Recommended plan for no diet, medication or health factor changes affecting INR     Dosing Instructions: continue your current warfarin dose with next INR in 3 weeks       Summary  As of 6/21/2022    Full warfarin instructions:  5 mg every day   Next INR check:  7/12/2022             Detailed voice message left for Moshe with dosing instructions and follow up date.     Contact [phone]  to schedule and with any changes, questions or concerns.     Education provided: Please call back if any changes to your diet, medications or how you've been taking warfarin    Plan made per [facility] anticoagulation protocol    Nuha Murray RN  Anticoagulation Clinic  6/21/2022    _______________________________________________________________________     Anticoagulation Episode Summary     Current INR goal:  2.0-3.0   TTR:  77.4 % (1 y)   Target end date:  Indefinite   Send INR reminders to:  DOMINIQUE CERVANTES    Indications    Long term current use of anticoagulant therapy [Z79.01]  Chronic atrial fibrillation (H) [I48.20]           Comments:           Anticoagulation Care Providers     Provider Role Specialty Phone number    Chad Gee DO [facility] Internal Medicine [phone]

## 2022-06-21 NOTE — PROGRESS NOTES
HPI and Plan:   See dictation    Today's clinic visit entailed:  Review of the result(s) of each unique test - bmp, flp. alt, echo  Ordering of each unique test  Prescription drug management  30 minutes spent on the date of the encounter doing chart review, review of test results, patient visit and documentation   Provider  Link to [facility] Help Grid     The level of medical decision making during this visit was of high complexity.      Orders Placed This Encounter   Procedures     Basic metabolic panel     Lipid Profile     ALT     Follow-Up with Cardiology     Echocardiogram Complete       Orders Placed This Encounter   Medications     ipratropium-albuterol (COMBIVENT RESPIMAT)  MCG/ACT inhaler       There are no discontinued medications.      Encounter Diagnoses   Name Primary?     Chronic systolic heart failure (H)      Ischemic cardiomyopathy Yes     Coronary artery disease involving native coronary artery of native heart without angina pectoris      Hypertension goal BP (blood pressure) < 140/90      Hyperlipidemia LDL goal <70      Pulmonary emphysema, unspecified emphysema type (H)      Chronic atrial fibrillation (H)      Dyspnea on exertion        CURRENT MEDICATIONS:  Current Outpatient Medications   Medication Sig Dispense Refill     aspirin 81 MG tablet Take 1 tablet by mouth daily.       carvedilol (COREG) 12.5 MG tablet Take 1 tablet (12.5 mg) by mouth 2 times daily (with meals) 180 tablet 2     finasteride (PROSCAR) 5 MG tablet TAKE ONE TABLET BY MOUTH ONCE DAILY 90 tablet 0     furosemide (LASIX) 20 MG tablet Take 1 tablet (20 mg) by mouth daily 90 tablet 1     ipratropium-albuterol (COMBIVENT RESPIMAT)  MCG/ACT inhaler        lisinopril (ZESTRIL) 10 MG tablet Take 1 tablet (10 mg) by mouth daily 90 tablet 1     simvastatin (ZOCOR) 80 MG tablet Take 1 tablet (80 mg) by mouth daily 90 tablet 2     warfarin ANTICOAGULANT (JANTOVEN ANTICOAGULANT) 5 MG tablet Take 5 mg daily, or as directed by  the [facility]. 90 tablet 1       ALLERGIES     Allergies   Allergen Reactions     Codeine GI Disturbance     Niacin Hives     got very red and flushed.  Doesn't want       PAST MEDICAL HISTORY:  Past Medical History:   Diagnosis Date     Atrial fibrillation (H) 07    Admit. Discharged 04/15/07     Atrial flutter (H)      Coronary artery disease      Headache(784.0)      Hepatitis, unspecified      Hyperlipidaemia      Hypertension      Measles without mention of complication     Measles     Mumps without mention of complication     Mumps     Orchitis and epididymitis, unspecified      Other and unspecified hyperlipidemia      Other emphysema (H)      Other speech disturbance     Stuttering     Pneumonia, organism unspecified(486)     Pneumonia     Shortness of breath      Urinary obstruction      Varicella without mention of complication     Chickenpox       PAST SURGICAL HISTORY:  Past Surgical History:   Procedure Laterality Date     COLONOSCOPY  2011    Procedure:COLONOSCOPY; colonoscopy; Surgeon:IVETH WIGGINS; Location:[facility]     HC LAPAROSCOPY, SURGICAL; CHOLECYSTECTOMY      Cholecystectomy, Laparoscopic     HC REMOVAL OF TONSILS,<13 Y/O      Tonsils <12y.o.     LAMINECT/DISCECTOMY, LUMBAR  08    Right L4-L5 microlumbar diskectomy.     ZZHC COLONOSCOPY W/WO BRUSH/WASH  06       FAMILY HISTORY:  Family History   Problem Relation Age of Onset     Alzheimer Disease Mother         ?dimentia or alzheimers     Diabetes Mother         insulin     EYE* Mother         cataract     Cerebrovascular Disease Mother      Cancer Father         lymph tumor of glands     Diabetes Maternal Aunt         ? oral or insulin     Diabetes Other         4 cousins insulin dependent     Genetic Disorder Maternal Grandmother         tremors     Heart Disease Paternal Uncle         ? at age 60-70     Neurologic Disorder Paternal Uncle         parkinsons     Cerebrovascular Disease Paternal Uncle         ?  dued at age 60-70       SOCIAL HISTORY:  Social History     Socioeconomic History     Marital status:      Spouse name: Irlanda     Number of children: 2     Years of education: 12+   Occupational History     Occupation: retired telephone tech     Employer: [employer]   Tobacco Use     Smoking status: Former Smoker     Packs/day: 0.50     Types: Cigarettes     Smokeless tobacco: Never Used     Tobacco comment: smoked for 57 years- since age 9   Substance and Sexual Activity     Alcohol use: Yes     Alcohol/week: 0.0 standard drinks     Comment: socially     Drug use: No     Sexual activity: Not Currently     Partners: Female   Other Topics Concern      Service Yes     Comment: [employer] x 8 yrs     Blood Transfusions No     Caffeine Concern No     Comment: 6 cups coffee/day     Occupational Exposure No     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 Yes       Review of Systems:  Skin:  Negative       Eyes:  Positive for glasses    ENT:  Positive for hearing loss    Respiratory:  Positive for dyspnea on exertion COPD   Cardiovascular:  Negative for;palpitations;chest pain;edema;lightheadedness;dizziness      Gastroenterology: Negative      Genitourinary:  Negative      Musculoskeletal:  Positive for muscular weakness    Neurologic:  Positive for incoordination balance issues  Psychiatric:  Negative      Heme/Lymph/Imm:  Positive for allergies    Endocrine:  Negative        Physical Exam:  Vitals: /60 (BP Location: Left arm, Patient Position: Sitting, Cuff Size: Adult Large)   Pulse 78    Ht 1.829 m (6')   Wt 108.9 kg (240 lb)   SpO2 96%   BMI 32.55 kg/m      Constitutional:           Skin:             Head:           Eyes:           Lymph:      ENT:           Neck:           Respiratory:            Cardiac:                                                           GI:           Extremities and Muscular Skeletal:                 Neurological:           Psych:           CC  Jaquan Platt MD  [address]  [place] [id number]

## 2022-06-24 NOTE — TELEPHONE ENCOUNTER
Warfarin  Recent Labs   Lab Test 06/21/22  0918   INR 2.5*   Sending to anticoagulant team for review    proscar  Prescription approved per [facility] Refill Protocol.    Elizabeth Schwarz RN

## 2022-06-24 NOTE — TELEPHONE ENCOUNTER
ANTICOAGULATION MANAGEMENT:  Medication Refill    Anticoagulation Summary  As of 6/21/2022    Warfarin maintenance plan:  5 mg (5 mg x 1) every day   Next INR check:  7/12/2022   Target end date:  Indefinite    Indications    Long term current use of anticoagulant therapy [Z79.01]  Chronic atrial fibrillation (H) [I48.20]             Anticoagulation Care Providers     Provider Role Specialty Phone number    MarlenRamanaChadmary Pavon DO Referring Internal Medicine [phone]          Visit with referring provider/group within last year: Yes    [facility] referral signed within last year: Yes    Kevin meets all criteria for refill (current [facility] referral, office visit with referring provider/group in last year, lab monitoring up to date or not exceeding 2 weeks overdue). Rx instructions and quantity supplied updated to match patient's current dosing plan. Warfarin 90 day supply with 1 refill granted per [facility] protocol     Janis Green, RN  Anticoagulation Clinic

## 2022-07-05 NOTE — PROGRESS NOTES
ANTICOAGULATION MANAGEMENT     Kevin Partida 87 year old male is on warfarin with therapeutic INR result. (Goal INR 2.0-3.0)    Recent labs: (last 7 days)     07/05/22  0942   INR 2.13*       ASSESSMENT       Source(s): Chart Review       Warfarin doses taken: Reviewed in chart    Diet: LM    New illness, injury, or hospitalization: No    Medication/supplement changes: None noted    Signs or symptoms of bleeding or clotting: No    Previous INR: Therapeutic last visit; previously outside of goal range    Additional findings: None       PLAN     Recommended plan for no diet, medication or health factor changes affecting INR     Dosing Instructions: continue your current warfarin dose with next INR in 4 weeks       Summary  As of 7/5/2022    Full warfarin instructions:  5 mg every day   Next INR check:  8/2/2022             Detailed voice message left for Moshe with dosing instructions and follow up date.     Contact [phone]  to schedule and with any changes, questions or concerns.     Education provided: Please call back if any changes to your diet, medications or how you've been taking warfarin    Plan made per [facility] anticoagulation protocol    Nuha Murray RN  Anticoagulation Clinic  7/5/2022    _______________________________________________________________________     Anticoagulation Episode Summary     Current INR goal:  2.0-3.0   TTR:  77.4 % (1 y)   Target end date:  Indefinite   Send INR reminders to:  DOMINIQUE CERVANTES    Indications    Long term current use of anticoagulant therapy [Z79.01]  Chronic atrial fibrillation (H) [I48.20]           Comments:           Anticoagulation Care Providers     Provider Role Specialty Phone number    Chad Gee DO [facility] ([place], [place]) Internal Medicine [phone]

## 2022-08-01 NOTE — TELEPHONE ENCOUNTER
Coreg  Prescription approved per [facility] Refill Protocol.    Elizabeth Schwarz RN

## 2022-08-09 NOTE — PROGRESS NOTES
ANTICOAGULATION MANAGEMENT     Kevin Partida 87 year old male is on warfarin with therapeutic INR result. (Goal INR 2.0-3.0)    Recent labs: (last 7 days)     08/09/22  0952   INR 2.5*       ASSESSMENT       Source(s): Chart Review    Previous INR was Therapeutic last 2(+) visits    Medication, diet, health changes since last INR chart reviewed; none identified      I left a detailed voicemail with the orders reflected in flowsheet. I have also requested a call back if there have been any missed doses, concerns, illness, fever, or if there have been any changes in medications, activity level, or diet       PLAN     Unable to reach pt today.    VM left to call [facility] back. Will try again lter.     Follow up required to confirm warfarin dose taken and assess for changes    Janis Green RN  Anticoagulation Clinic  8/9/2022

## 2022-08-09 NOTE — PROGRESS NOTES
ANTICOAGULATION MANAGEMENT     Kevin Partida 87 year old male is on warfarin with therapeutic INR result. (Goal INR 2.0-3.0)    Recent labs: (last 7 days)     08/09/22  0952   INR 2.5*       ASSESSMENT       Source(s): Chart Review and Patient/Caregiver Call Irlanda, wife       Warfarin doses taken: Warfarin taken as instructed    Diet: No new diet changes identified    New illness, injury, or hospitalization: No    Medication/supplement changes: None noted    Signs or symptoms of bleeding or clotting: No    Previous INR: Therapeutic last 2(+) visits    Additional findings: None       PLAN     Recommended plan for no diet, medication or health factor changes affecting INR     Dosing Instructions: Continue your current warfarin dose with next INR in 5 weeks       Summary  As of 8/9/2022    Full warfarin instructions:  5 mg every day   Next INR check:  9/13/2022             Telephone call with  wife who verbalizes understanding and agrees to plan    Lab visit scheduled    Education provided: Please call back if any changes to your diet, medications or how you've been taking warfarin    Plan made per [facility] anticoagulation protocol    Janis Green RN  Anticoagulation Clinic  8/9/2022    _______________________________________________________________________     Anticoagulation Episode Summary     Current INR goal:  2.0-3.0   TTR:  77.4 % (1 y)   Target end date:  Indefinite   Send INR reminders to:  DOMINIQUE CERVANTES    Indications    Long term current use of anticoagulant therapy [Z79.01]  Chronic atrial fibrillation (H) [I48.20]           Comments:           Anticoagulation Care Providers     Provider Role Specialty Phone number    Chad Gee DO [facility] Internal Medicine [phone]

## 2022-09-13 NOTE — PROGRESS NOTES
ANTICOAGULATION MANAGEMENT     Kevin Partida 87 year old male is on warfarin with therapeutic INR result. (Goal INR 2.0-3.0)    Recent labs: (last 7 days)     09/13/22  0958   INR 2.8*       ASSESSMENT       Source(s): Chart Review       Warfarin doses taken: Reviewed in chart    Diet: LM    New illness, injury, or hospitalization: No    Medication/supplement changes: None noted    Signs or symptoms of bleeding or clotting: No    Previous INR: Therapeutic last 2(+) visits    Additional findings: None       PLAN     Recommended plan for no diet, medication or health factor changes affecting INR     Dosing Instructions: Continue your current warfarin dose with next INR in 6 weeks       Summary  As of 9/13/2022    Full warfarin instructions:  5 mg every day   Next INR check:  10/25/2022             Detailed voice message left for Moshe with dosing instructions and follow up date.     Contact [phone]  to schedule and with any changes, questions or concerns.     Education provided: Please call back if any changes to your diet, medications or how you've been taking warfarin    Plan made per [facility] anticoagulation protocol    Nuha Murray RN  Anticoagulation Clinic  9/13/2022    _______________________________________________________________________     Anticoagulation Episode Summary     Current INR goal:  2.0-3.0   TTR:  77.4 % (1 y)   Target end date:  Indefinite   Send INR reminders to:  DOMINIQUE CERVANTES    Indications    Long term current use of anticoagulant therapy [Z79.01]  Chronic atrial fibrillation (H) [I48.20]           Comments:           Anticoagulation Care Providers     Provider Role Specialty Phone number    Chad Gee DO [facility] Internal Medicine [phone]

## 2022-10-05 NOTE — TELEPHONE ENCOUNTER
ANTICOAGULATION CLINIC REFERRAL RENEWAL REQUEST   Last annual visit was on 9/23/21      An annual renewal order is required for all patients referred to [facility]ticoagulation [facility].?  Please review and sign the pended referral order for Kevin Partida.       ANTICOAGULATION SUMMARY      Warfarin indication(s)   Atrial Fibrillation    Mechanical heart valve present?  NO       Current goal range   INR: 2.0-3.0     Goal appropriate for indication? Goal INR 2-3, standard for indication(s) above     Time in Therapeutic Range (TTR)  (Goal > 60%) 77.4%       Office visit with referring provider's group within last year no on 9/23/21       Janis Green, RN  [facility] Anticoagulation Clinic

## 2022-10-18 NOTE — TELEPHONE ENCOUNTER
Reason for Call:  Other appointment    Detailed comments: PATIENT IS CURRENTLY SCHEDULED FOR PHYSICAL AND UPDATE IMMUNIZATIONS WITH PCP, LAURIE PAUL ON 11.17.2022. SPOUSE MENTIONS THAT PATIENT HAS A COUGH WITH PHLEGM AND WOULD LIKE TO,  KNOW IF PCP COULD SEE PATIENT SOONER THAN NEXT AVAILABLE 11.16.2022.     Phone Number Patient can be reached at: Home number on file [phone] (home)    Best Time: SOONEST CONVENIENCE     Can we leave a detailed message on this number? YES    Call taken on 10/18/2022 at 8:37 AM by Jase Wood

## 2022-10-18 NOTE — TELEPHONE ENCOUNTER
Please place the patient on my schedule in any available 20 minute time slot that is not restricted.    If a timely appointment is not available, please refer the patient to one of the many excellent [place] providers who might have an opening.    Thank you.    Marlen

## 2022-10-25 NOTE — PROGRESS NOTES
ANTICOAGULATION MANAGEMENT     Kevin Partida 87 year old male is on warfarin with therapeutic INR result. (Goal INR 2.0-3.0)    Recent labs: (last 7 days)     10/25/22  0950   INR 2.6*       ASSESSMENT       Source(s): Chart Review and Patient/Caregiver Call       Warfarin doses taken: Warfarin taken as instructed    Diet: No new diet changes identified    New illness, injury, or hospitalization: No    Medication/supplement changes: None noted    Signs or symptoms of bleeding or clotting: No    Previous INR: Therapeutic last 2(+) visits    Additional findings: None       PLAN     Recommended plan for no diet, medication or health factor changes affecting INR     Dosing Instructions: Continue your current warfarin dose with next INR in 6 weeks       Summary  As of 10/25/2022    Full warfarin instructions:  5 mg every day; Starting 10/25/2022   Next INR check:  12/6/2022             Telephone call with wife who verbalizes understanding and agrees to plan    Lab visit scheduled    Education provided:     Please call back if any changes to your diet, medications or how you've been taking warfarin    Plan made per [facility] anticoagulation protocol    Janis Green RN  Anticoagulation Clinic  10/25/2022    _______________________________________________________________________     Anticoagulation Episode Summary     Current INR goal:  2.0-3.0   TTR:  79.6 % (1 y)   Target end date:  Indefinite   Send INR reminders to:  DOMINIQUE CERVANTES    Indications    Long term current use of anticoagulant therapy [Z79.01]  Chronic atrial fibrillation (H) [I48.20]           Comments:           Anticoagulation Care Providers     Provider Role Specialty Phone number    Chad Gee DO [facility] Internal Medicine [phone]

## 2022-10-25 NOTE — PROGRESS NOTES
ANTICOAGULATION MANAGEMENT     Kevin Partida 87 year old male is on warfarin with therapeutic INR result. (Goal INR 2.0-3.0)    Recent labs: (last 7 days)     10/25/22  0950   INR 2.6*       ASSESSMENT       Source(s): Chart Review    Previous INR was Therapeutic last 2(+) visits    Medication, diet, health changes since last INR chart reviewed; none identified           PLAN     Unable to reach pt today.    VM left to call [facility] back. Harpal try again later.     Follow up required to confirm warfarin dose taken and assess for changes    Janis Green, RN  Anticoagulation Clinic  10/25/2022

## 2022-11-02 NOTE — NURSING NOTE
Prior to immunization administration, verified patients identity using patient s name and date of birth. Please see Immunization Activity for additional information.     Screening Questionnaire for Adult Immunization    Are you sick today?   No   Do you have allergies to medications, food, a vaccine component or latex?   Yes   Have you ever had a serious reaction after receiving a vaccination?   No   Do you have a long-term health problem with heart, lung, kidney, or metabolic disease (e.g., diabetes), asthma, a blood disorder, no spleen, complement component deficiency, a cochlear implant, or a spinal fluid leak?  Are you on long-term aspirin therapy?   Yes   Do you have cancer, leukemia, HIV/AIDS, or any other immune system problem?   No   Do you have a parent, brother, or sister with an immune system problem?   No   In the past 3 months, have you taken medications that affect  your immune system, such as prednisone, other steroids, or anticancer drugs; drugs for the treatment of rheumatoid arthritis, Crohn s disease, or psoriasis; or have you had radiation treatments?   No   Have you had a seizure, or a brain or other nervous system problem?   No   During the past year, have you received a transfusion of blood or blood    products, or been given immune (gamma) globulin or antiviral drug?   No   For women: Are you pregnant or is there a chance you could become       pregnant during the next month?   No   Have you received any vaccinations in the past 4 weeks?   No     Immunization questionnaire was positive for at least one answer.  Notified Dr. Gee.        Per orders of  , injection of  given by Shireen Naidu LPN. Patient instructed to remain in clinic for 15 minutes afterwards, and to report any adverse reaction to me immediately.       Screening performed by Shireen Naidu LPN on 11/2/2022 at 10:31 AM.

## 2022-11-02 NOTE — PROGRESS NOTES
"SUBJECTIVE:   Moshe is a 87 year old who presents for Preventive Visit.    Patient has been advised of split billing requirements and indicates understanding: Yes  Are you in the first 12 months of your Medicare coverage?  No    Healthy Habits:     In general, how would you rate your overall health?  Fair    Frequency of exercise:  1 day/week    Duration of exercise:  N/A    Do you usually eat at least 4 servings of fruit and vegetables a day, include whole grains    & fiber and avoid regularly eating high fat or \"junk\" foods?  No    Taking medications regularly:  Yes    Medication side effects:  Other    Ability to successfully perform activities of daily living:  No assistance needed    Home Safety:  Throw rugs in the hallway    Hearing Impairment:  Difficulty following a conversation in a noisy restaurant or crowded room, feel that people are mumbling or not speaking clearly, need to ask people to speak up or repeat themselves, difficulty understanding soft or whispered speech and difficulty understanding speech on the telephone    In the past 6 months, have you been bothered by leaking of urine?  No    In general, how would you rate your overall mental or emotional health?  Fair      PHQ-2 Total Score: 2    Additional concerns today:  No    Do you feel safe in your environment? Yes    Have you ever done Advance Care Planning? (For example, a Health Directive, POLST, or a discussion with a medical provider or your loved ones about your wishes): Yes, advance care planning is on file.     Fall risk  Fallen 2 or more times in the past year?: No  Any fall with injury in the past year?: No    Cognitive Screening   1) Repeat 3 items (Leader, Season, Table)    2) Clock draw: ABNORMAL inforrect time  3) 3 item recall: Recalls 3 objects  Results: 3 items recalled: COGNITIVE IMPAIRMENT LESS LIKELY    Mini-CogTM Copyright NICOLE Herrera. Licensed by the author for use in [facility]; reprinted with permission " (kassie@[email or web address]). All rights reserved.      Do you have sleep apnea, excessive snoring or daytime drowsiness?: yes    Reviewed and updated as needed this visit by clinical staff   Tobacco  Allergies  Meds   Med Hx  Surg Hx  Fam Hx  Soc Hx        Reviewed and updated as needed this visit by Provider                 Social History     Tobacco Use     Smoking status: Former     Packs/day: 0.50     Types: Cigarettes     Smokeless tobacco: Never     Tobacco comments:     smoked for 57 years- since age 9   Substance Use Topics     Alcohol use: Yes     Alcohol/week: 0.0 standard drinks     Comment: socially     If you drink alcohol do you typically have >3 drinks per day or >7 drinks per week? No    Alcohol Use 11/2/2022   Prescreen: >3 drinks/day or >7 drinks/week? Not Applicable   Prescreen: >3 drinks/day or >7 drinks/week? -       Current providers sharing in care for this patient include:   Patient Care Team:  Chad Gee DO as PCP - General (Internal Medicine)  Chad Gee DO as Assigned PCP  Jaquan Platt MD as Assigned Heart and Vascular Provider    The following health maintenance items are reviewed in Epic and correct as of today:  Health Maintenance   Topic Date Due     SPIROMETRY  Never done     ANNUAL REVIEW OF HM ORDERS  Never done     COPD ACTION PLAN  Never done     COVID-19 Vaccine (1) Never done     ZOSTER IMMUNIZATION (1 of 2) Never done     MEDICARE ANNUAL WELLNESS VISIT  11/19/2020     INFLUENZA VACCINE (1) 09/01/2022     ALT  07/05/2023     LIPID  07/05/2023     DTAP/TDAP/TD IMMUNIZATION (3 - Td or Tdap) 10/08/2023     FALL RISK ASSESSMENT  11/02/2023     ADVANCE CARE PLANNING  11/19/2024     PHQ-2 (once per calendar year)  Completed     Pneumococcal Vaccine: 65+ Years  Completed     HEPATITIS B IMMUNIZATION  Completed     IPV IMMUNIZATION  Aged Out     MENINGITIS IMMUNIZATION  Aged Out     Labs reviewed in EPIC  Pneumonia Vaccine:For adults with an  immunocompromising condition, cerebrospinal fluid leak, or cochlear implant, administer 1 dose of PCV13 first then give 1 dose of PPSV23 at least 1 year later. Anyone who received any doses of PPSV23 before age 65 should receive 1 final dose of the vaccine at age 65 or older. Administer this last dose at least 5 years after the prior PPSV23 dose.    Review of Systems   Constitutional: Negative for chills and fever.   HENT: Positive for congestion and hearing loss. Negative for ear pain and sore throat.    Eyes: Negative for pain and visual disturbance.   Respiratory: Positive for cough and shortness of breath.    Cardiovascular: Negative for chest pain, palpitations and peripheral edema.   Gastrointestinal: Negative for abdominal pain, constipation, diarrhea, heartburn, hematochezia and nausea.   Genitourinary: Positive for urgency. Negative for dysuria, frequency, genital sores, hematuria, impotence and penile discharge.   Musculoskeletal: Positive for arthralgias. Negative for joint swelling and myalgias.   Skin: Negative for rash.   Neurological: Positive for weakness. Negative for dizziness, headaches and paresthesias.   Psychiatric/Behavioral: Negative for mood changes. The patient is not nervous/anxious.      Congestion is a historical symptom, not of concern to the patient today.  Arthralgia and weakness is appropriate for age.  Patient recently got a walker, which he likes.  Urinary urgency is managed with finasteride, not of concern to the patient today.    #1  Pulmonary emphysema    Reports worsening cough, especially at night.  Patient unable to sleep an entire night in bed, sleeps in recliner.  Cough is productive.  Currently uses ipratropium-albuterol inhaler with minimal benefit.  Patient is adamant he does not want home oxygen.       EXAM   NEURO: Pt is alert and appropriate. No neurologic lateralization is noted. Cranial nerves 2-12 are intact. Peripheral sensory and motor function are grossly  "normal.    HENT: ear canals and TM's normal, nose and mouth without ulcers or lesions. Hearing acuity severely diminished.   CV: irregular irregular rate and rhythm, normal S1 S2, no S3 or S4, no murmur, click or rub.  Peripheral edema and peripheral pulses weak   RESP: lungs positive for wheezes  SKIN: Bilateral lower legs show violaceous coloring with scattered ulcers.      OBJECTIVE:   /74 (BP Location: Right arm, Patient Position: Chair, Cuff Size: Adult Large)   Pulse 76   Temp 98.2  F (36.8  C) (Temporal)   Resp 20   Ht 1.848 m (6' 0.75\")   Wt 105.8 kg (233 lb 4.8 oz)   SpO2 (!) 88%   BMI 30.99 kg/m   Estimated body mass index is 30.99 kg/m  as calculated from the following:    Height as of this encounter: 1.848 m (6' 0.75\").    Weight as of this encounter: 105.8 kg (233 lb 4.8 oz).     Diagnostic Test Results:  Labs reviewed in Epic    ASSESSMENT / PLAN:   Kevin was seen today for wellness visit.    Diagnoses and all orders for this visit:    Medicare annual wellness visit, subsequent    Pulmonary emphysema, unspecified emphysema type (H)  Recommend switching from inhaler to nebulizer to maximize effectiveness of medicine.  If no improvement with prednisone, will consider modifying medical management.  Chest x-ray to rule out any underlying condition.  Spirometry to characterize change in breathing quality.  -     Spirometry, Breathing Capacity, Normal Order, Clinic Performed; Future  -     XR Chest 2 Views; Future  -     ipratropium - albuterol 0.5 mg/2.5 mg/3 mL (DUONEB) 0.5-2.5 (3) MG/3ML neb solution; Take 1 vial (3 mLs) by nebulization every 6 hours as needed for shortness of breath / dyspnea or wheezing  -     Nebulizer with Compressor  -     predniSONE (DELTASONE) 20 MG tablet; Take 0.5 tablets (10 mg) by mouth daily    Cardiovascular disease  Condition is stable.  Continue medication as prescribed.  [facility] [place]    Hypertension goal BP (blood " "pressure) < 140/90  Blood pressure within normal limits, continue medication as prescribed.  -     Basic metabolic panel  (Ca, Cl, CO2, Creat, Gluc, K, Na, BUN); Future  -     UA Macro with Reflex to Micro and Culture - lab collect; Future  -     Urine Culture    Chronic atrial fibrillation (H)  Condition is stable.  Continue medication as prescribed.  Last echocardiogram was performed October 2021.  Recommend repeat echo at follow-up.    Long term current use of anticoagulant therapy  Condition is stable.  INR ranges between 1.5 - 2.6.        Patient has been advised of split billing requirements and indicates understanding: Yes      COUNSELING:  Reviewed preventive health counseling, as reflected in patient instructions    Estimated body mass index is 30.99 kg/m  as calculated from the following:    Height as of this encounter: 1.848 m (6' 0.75\").    Weight as of this encounter: 105.8 kg (233 lb 4.8 oz).    Weight management plan: Discussed healthy diet and exercise guidelines    He reports that he has quit smoking. His smoking use included cigarettes. He smoked an average of .5 packs per day. He has never used smokeless tobacco.      Appropriate preventive services were discussed with this patient, including applicable screening as appropriate for cardiovascular disease, diabetes, osteopenia/osteoporosis, and glaucoma.  As appropriate for age/gender, discussed screening for colorectal cancer, prostate cancer, breast cancer, and cervical cancer. Checklist reviewing preventive services available has been given to the patient.    Reviewed patients plan of care and provided an AVS. The Complex Care Plan (for patients with higher acuity and needing more deliberate coordination of services) for Kevin meets the Care Plan requirement. This Care Plan has been established and reviewed with the spouse.    Counseling Resources:  ATP IV Guidelines  Pooled Cohorts Equation Calculator  Breast Cancer Risk Calculator  Breast " Cancer: Medication to Reduce Risk  FRAX Risk Assessment  ICSI Preventive Guidelines  Dietary Guidelines for Americans, 2010  USDA's MyPlate  ASA Prophylaxis  Lung CA Screening    Chad Gee [facility]    Identified Health Risks:  Answers for HPI/ROS submitted by the patient on 11/2/2022  If you checked off any problems, how difficult have these problems made it for you to do your work, take care of things at home, or get along with other people?: Very difficult  PHQ9 TOTAL SCORE: 5

## 2022-11-03 NOTE — TELEPHONE ENCOUNTER
Pharmacy was able to follow up on coverage for the nebulizer machine. Insurance will cover through a Medical Supply company either [email or web address] or [employer], which would mail machine to patient. Spouse notified of this and okay with it being mailed to them. Pharmacy updated to go ahead and send information to [facility] to supply the neb machine. Nuvia Recinos LPN

## 2022-11-03 NOTE — TELEPHONE ENCOUNTER
General Call    Contacts       Type Contact Phone/Fax    11/03/2022 09:54 AM CDT Phone (Incoming) Irlanda Partida (Emergency Contact) [phone]        Reason for Call:  Spouse stating they went to pharmacy to  prescriptions but were not able to get a neb machine.     What are your questions or concerns:  Spouse stating the insurance would not cover the nebulizer machine. Writer informed we will contact pharmacy for further  Information and see what further can be done. Writer did speak with the pharmacy and they are going to reach out to patients insurance to see if the neb machine can be covered and will up date when information has been received.    Date of last appointment with provider: 11/2/22    Could we send this information to you in [facility] or would you prefer to receive a phone call?:   Patient would prefer a phone call   Okay to leave a detailed message?: Yes Cell number [phone]

## 2022-11-11 NOTE — ED PROVIDER NOTES
History     Chief Complaint   Patient presents with     Cardiac Arrest     HPI  Kevin Partida is a 87 year old male who presents via paramedics after cardiac arrest.  Apparently, his wife found him down in the bathroom but he was blocked and behind the door.  Someone had to climb in through the window and after at least 10 minutes of downtime they found him pulseless.  Paramedics report initial rhythm was asystole.  He received 4 doses push of epinephrine with some transient response.  They ran out of epinephrine and started him on an epi drip.  They do not have pulses upon presentation they state.  They have intubated him and he is on a Nash device with chest compressions.  Paramedics report they had been working on him for an hour and 9 minutes.  Allergies:  Allergies   Allergen Reactions     Codeine GI Disturbance     Niacin Hives     got very red and flushed.  Doesn't want       Problem List:    Patient Active Problem List    Diagnosis Date Noted     Status post percutaneous transluminal coronary angioplasty 09/30/2020     Priority: Medium     Rupture of right distal biceps tendon, initial encounter 12/22/2018     Priority: Medium     Chronic atrial fibrillation (H) 07/26/2016     Priority: Medium     Long term current use of anticoagulant therapy 03/14/2016     Priority: Medium     CAD (coronary artery disease) 05/19/2014     Priority: Medium     Advance Care Planning 09/12/2011     Priority: Medium     Advance Care Planning 12/5/2016: Receipt of ACP document:  Received: Health Care Directive which was witnessed or notarized on 4-28-16.  Document previously scanned on 9-30-16.  Validation form completed and sent to be scanned.  Code Status needs to be updated to reflect choices in most recent ACP document.  Confirmed/documented designated decision maker(s).  Added by Itzel Fuentes RN Advance Care Planning Liaison with [employer]  Discussed advance care planning with patient; information given to  patient to review. 9/12/2011  Johana Fraga PharmD           Hypertension goal BP (blood pressure) < 140/90 07/12/2011     Priority: Medium     HYPERLIPIDEMIA LDL GOAL <100 10/31/2010     Priority: Medium     Cardiovascular disease 05/18/2007     Priority: Medium     Problem list name updated by automated process. Provider to review       Tobacco use disorder 05/18/2007     Priority: Medium     Primary cardiomyopathy (H) 05/18/2007     Priority: Medium     Problem list name updated by automated process. 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 Provider to review          Past Medical History:    Past Medical History:   Diagnosis Date     Atrial fibrillation (H) 04/14/07     Atrial flutter (H)      Coronary artery disease      Headache(784.0)      Hepatitis, unspecified      Hyperlipidaemia      Hypertension      Measles without mention of complication      Mumps without mention of complication      Orchitis and epididymitis, unspecified      Other and unspecified hyperlipidemia      Other emphysema (H)      Other speech disturbance      Pneumonia, organism unspecified(486)      Shortness of breath      Urinary obstruction      Varicella without mention of complication        Past Surgical History:    Past Surgical History:   Procedure Laterality Date     COLONOSCOPY  9/13/2011    Procedure:COLONOSCOPY; colonoscopy; Surgeon:IVETH WIGGINS; Location:[facility]     HC LAPAROSCOPY, SURGICAL; CHOLECYSTECTOMY  1998    Cholecystectomy, Laparoscopic     HC REMOVAL OF TONSILS,<11 Y/O      Tonsils <12y.o.     LAMINECT/DISCECTOMY, LUMBAR  03/26/08    Right L4-L5 microlumbar diskectomy.     ZZ COLONOSCOPY W/WO BRUSH/WASH  05/08/06       Family History:    Family History   Problem Relation Age of Onset     Alzheimer Disease Mother         ?dimentia or alzheimers     Diabetes Mother         insulin     EYE* Mother          cataract     Cerebrovascular Disease Mother      Cancer Father         lymph tumor of glands     Diabetes Maternal Aunt         ? oral or insulin     Diabetes Other         4 cousins insulin dependent     Genetic Disorder Maternal Grandmother         tremors     Heart Disease Paternal Uncle         ? at age 60-70     Neurologic Disorder Paternal Uncle         parkinsons     Cerebrovascular Disease Paternal Uncle         ? dued at age 60-70       Social History:  Marital Status:   [2]  Social History     Tobacco Use     Smoking status: Former     Packs/day: 0.50     Types: Cigarettes     Smokeless tobacco: Never     Tobacco comments:     smoked for 57 years- since age 9   Substance Use Topics     Alcohol use: Yes     Alcohol/week: 0.0 standard drinks     Comment: socially     Drug use: No        Medications:    aspirin 81 MG tablet  carvedilol (COREG) 12.5 MG tablet  finasteride (PROSCAR) 5 MG tablet  furosemide (LASIX) 20 MG tablet  ipratropium - albuterol 0.5 mg/2.5 mg/3 mL (DUONEB) 0.5-2.5 (3) MG/3ML neb solution  ipratropium-albuterol (COMBIVENT RESPIMAT)  MCG/ACT inhaler  lisinopril (ZESTRIL) 10 MG tablet  predniSONE (DELTASONE) 20 MG tablet  simvastatin (ZOCOR) 80 MG tablet  warfarin ANTICOAGULANT (JANTOVEN ANTICOAGULANT) 5 MG tablet          Review of Systems   Unable to perform ROS: Acuity of condition       Physical Exam          Physical Exam  Constitutional:       Interventions: He is intubated.      Comments: Unresponsive on Nash device   HENT:      Head:      Comments: Pupils fixed, no corneal response  Intubated with ET tube  Cardiovascular:      Rate and Rhythm: Bradycardia present.      Comments: No palpable pulse  Pulmonary:      Effort: He is intubated.      Comments: No respiratory effort  Chest:      Comments: Nash in place no obvious deformity  Abdominal:      General: Abdomen is flat.      Palpations: Abdomen is soft. There is no mass.   Musculoskeletal:      Comments:  Extremities cool but no gross deformities   Skin:     Coloration: Skin is mottled and pale.   Neurological:      Mental Status: He is unresponsive.      GCS: GCS eye subscore is 1. GCS verbal subscore is 1. GCS motor subscore is 1.         ED Course                 Procedures              Critical Care time:  none               No results found for this or any previous visit (from the past 24 hour(s)).    Medications - No data to display    Assessments & Plan (with Medical Decision Making)  87-year-old male who presents via paramedics after cardiac arrest.  They had been working on him for over an hour and present with no pulse and agonal rhythm.  He had received multiple doses of epinephrine.  No respiratory effort.  Pupils fixed and no corneal response.  Decision was made to discontinue any further efforts.  Official time of death 5/13/2019       I have reviewed the nursing notes.    I have reviewed the findings, diagnosis, plan and need for follow up with the patient.       New Prescriptions    No medications on file       Final diagnoses:   Cardiac arrest (H)       11/11/2022   [facility] EMERGENCY DEPT     Davonte Mckee MD  11/11/22 [id number]

## 2022-11-11 NOTE — ED NOTES
Initially [facility] called with an ETA of 20 mins.  [facility] called with an updated ETA, 5 mins of pt arrival  1305 code blue called  1306 team arrived  1315 EMS arrived with ptDK in progress  EMS report- Pt went to the bathroom and was in the tub.  Pt was struggling to get out of the tub.  Wife was on the way to assist pt when she heard a thud and was unable to get the door open.  From the 911 call until the time when the  was able to get through the window was 10 mins per EMS.  EMS gave 4 rounds of Epinephrine, wide complex rhythm at that time with a pulse.  Per EMS they lost the pulse again.  Another round of Epinephrine.  Pulse returned then lost again.  EMS started an Epinephrine drip.  Then lost the pulse again on the way to the hospital.  On arrival Epi drip was running at 40 mcg/min and DK was running.   Wife arrived with EMS.  Evelyn present and with wife.  1318 pulse check, no pulses  Dr Mckee primary provider.  Per provider no pupil or cornea reflexes  1319 time of death called by Dr Mckee

## 2022-11-11 NOTE — PROGRESS NOTES
SPIRITUAL HEALTH SERVICES  [facility]  Progress Note    REFERRAL SOURCE: Code Blue    NOTE: I responded to the CPR death of patient.  The patient's wife rode in the ambulance, so I met her.  I offered emotional and spiritual support to her.  She was a bit teary and shaky.  She was open to my introduction and to emotional and spiritual support.  I sat with her as she spent time with her 's body.  She spoke, by phone, to each of her 3 children.  I talked with her about  home arrangements, did some life review with her, said a prayer with her, and gave her a blessing.  She was appreciative.  Eventually, her neighbor came to take her home.      PLAN: No f/u.    Raj Reilly, Ph.d,   Spiritual Health Services  [address] [place] [address]    Office: [phone]   [email or web address]

## 2022-11-11 NOTE — ED NOTES
"Called  ([facility]) - per Yin Adam, body can be \"released\" as it was deemed \"natural causes\" of death at 1402.    Called Donor Coordinator who called back, spoke with Tomi, Reference Number: [id number].  As patient has dementia, he was ineligible to donate at 1408.    Pt was released into Counts include 234 beds at the [facility]  Home in [facility]. Spoke with Marcos, his remains were taken at 1415. Signed and completed death record with  home.     Completed post-mortem flowsheet.    Wife, Irlanda was taken home by neighbor. vanessa Anthonyor, was by her side the whole time.  "

## 2022-12-19 DIAGNOSIS — R33.8 BENIGN PROSTATIC HYPERPLASIA WITH URINARY RETENTION: ICD-10-CM

## 2022-12-19 DIAGNOSIS — N40.1 BENIGN PROSTATIC HYPERPLASIA WITH URINARY RETENTION: ICD-10-CM

## 2022-12-19 DIAGNOSIS — I48.20 CHRONIC ATRIAL FIBRILLATION (H): ICD-10-CM

## 2022-12-19 RX ORDER — WARFARIN SODIUM 5 MG/1
TABLET ORAL
Qty: 90 TABLET | Refills: 1 | OUTPATIENT
Start: 2022-12-19

## 2022-12-19 RX ORDER — FINASTERIDE 5 MG/1
TABLET, FILM COATED ORAL
Qty: 90 TABLET | Refills: 0 | OUTPATIENT
Start: 2022-12-19

## 2022-12-25 DIAGNOSIS — J43.9 PULMONARY EMPHYSEMA, UNSPECIFIED EMPHYSEMA TYPE (H): ICD-10-CM

## 2022-12-25 RX ORDER — PREDNISONE 20 MG/1
TABLET ORAL
Qty: 30 TABLET | Refills: 0 | OUTPATIENT
Start: 2022-12-25

## 2023-02-22 ENCOUNTER — ANTICOAGULATION THERAPY VISIT (OUTPATIENT)
Dept: ANTICOAGULATION | Facility: CLINIC | Age: 88
End: 2023-02-22
Payer: COMMERCIAL

## 2023-02-22 DIAGNOSIS — I48.20 CHRONIC ATRIAL FIBRILLATION (H): ICD-10-CM

## 2023-02-22 DIAGNOSIS — Z79.01 LONG TERM CURRENT USE OF ANTICOAGULANT THERAPY: Primary | ICD-10-CM

## 2023-02-22 NOTE — PROGRESS NOTES
ANTICOAGULATION  MANAGEMENT    Kevin Partida is being discharged from the [facility] Anticoagulation Management Program ([facility]).    Reason for discharge:     Anticoagulation episode resolved, [facility] referral closed and Standing order discontinued        aSmmy Gomez RN